# Patient Record
Sex: FEMALE | Race: WHITE | NOT HISPANIC OR LATINO | Employment: UNEMPLOYED | URBAN - METROPOLITAN AREA
[De-identification: names, ages, dates, MRNs, and addresses within clinical notes are randomized per-mention and may not be internally consistent; named-entity substitution may affect disease eponyms.]

---

## 2017-02-07 DIAGNOSIS — K22.2 SCHATZKI'S RING: Primary | ICD-10-CM

## 2017-02-07 NOTE — TELEPHONE ENCOUNTER
Due for check up in March- so if dosing every other day, thirty pills should be fine  Would like to discuss her symptoms and alternative treatment at that time

## 2017-02-07 NOTE — TELEPHONE ENCOUNTER
Pending Prescriptions:                       Disp   Refills    omeprazole (PRILOSEC) 20 MG CR capsule [P*30 cap*0            Sig: TAKE ONE CAPSULE BY MOUTH EVERY DAY    BJS please review:    Last refill was 11-5-2015  Pt had px on 3-.  Do you want to refill for 30? Or longer?  Please change qty, fax, deny or send to   Dawna  [phone] (home) [phone] (work)

## 2017-04-05 ENCOUNTER — TRANSFERRED RECORDS (OUTPATIENT)
Dept: FAMILY MEDICINE | Facility: CLINIC | Age: 57
End: 2017-04-05

## 2017-04-06 ENCOUNTER — TRANSFERRED RECORDS (OUTPATIENT)
Dept: FAMILY MEDICINE | Facility: CLINIC | Age: 57
End: 2017-04-06

## 2017-04-10 DIAGNOSIS — J31.0 CHRONIC RHINITIS: ICD-10-CM

## 2017-04-10 RX ORDER — FLUTICASONE PROPIONATE 50 MCG
SPRAY, SUSPENSION (ML) NASAL
Qty: 3 BOTTLE | Refills: 3 | Status: SHIPPED | OUTPATIENT
Start: 2017-04-10 | End: 2019-05-17

## 2017-04-10 NOTE — TELEPHONE ENCOUNTER
Ana Luisa Rao is requesting a refill of:    Pending Prescriptions:                       Disp   Refills    fluticasone (FLONASE) 50 MCG/ACT spray [P*16 g   0            Sig: USE 1-2 SPRAYS INTO BOTH NOSTRILS ONCE DAILY    Pt last seen 3/21/16. Please advise

## 2017-05-15 ENCOUNTER — OFFICE VISIT (OUTPATIENT)
Dept: FAMILY MEDICINE | Facility: CLINIC | Age: 57
End: 2017-05-15

## 2017-05-15 VITALS
HEIGHT: 65 IN | HEART RATE: 72 BPM | DIASTOLIC BLOOD PRESSURE: 76 MMHG | SYSTOLIC BLOOD PRESSURE: 128 MMHG | TEMPERATURE: 98.2 F | WEIGHT: 227.2 LBS | BODY MASS INDEX: 37.85 KG/M2

## 2017-05-15 DIAGNOSIS — M79.2 NEUROPATHIC PAIN SYNDROME (NON-HERPETIC): Primary | ICD-10-CM

## 2017-05-15 DIAGNOSIS — Z87.39 HISTORY OF OSTEOPENIA: ICD-10-CM

## 2017-05-15 DIAGNOSIS — G47.00 INSOMNIA, UNSPECIFIED: ICD-10-CM

## 2017-05-15 PROCEDURE — 99213 OFFICE O/P EST LOW 20 MIN: CPT | Performed by: PHYSICIAN ASSISTANT

## 2017-05-15 RX ORDER — GABAPENTIN 300 MG/1
CAPSULE ORAL
Qty: 270 CAPSULE | Refills: 3 | Status: SHIPPED | OUTPATIENT
Start: 2017-05-15 | End: 2021-06-18

## 2017-05-15 RX ORDER — MIRTAZAPINE 15 MG/1
15 TABLET, FILM COATED ORAL AT BEDTIME
Qty: 1 TABLET | Refills: 0 | Status: SHIPPED | OUTPATIENT
Start: 2017-05-15 | End: 2017-08-07 | Stop reason: SINTOL

## 2017-05-15 RX ORDER — ZOLPIDEM TARTRATE 10 MG/1
5 TABLET ORAL
Qty: 30 TABLET | Refills: 0 | Status: SHIPPED | OUTPATIENT
Start: 2017-05-15 | End: 2017-08-07

## 2017-05-15 NOTE — PROGRESS NOTES
"CC: Medication check    History:  Ana Luisa is here for multiple medication refills.     For her nerve pain she takes Gabapentin 300 mg AM, 600 mg PM. She is tolerating this well, but does admit she still struggles with the pain of her fibromyalgia.     Prilosec 20 mg daily. Does not think she needs refill of this today, but she has been taking this for several years to help with her reflux. She has not had a DEXA scan since 2005, which showed osteopenia.     She also takes Fluticasone 2 sprays once daily.     Ana Luisa takes Ambien as needed for sleep at night. She uses this sparingly. She tolerates it well. When she doesn't want to use the Ambien she takes Benadryl almost every night, but this makes her groggy when she wakes up.     PMH, MEDICATIONS, ALLERGIES, SOCIAL AND FAMILY HISTORY in [facility] and reviewed by me personally.      ROS negative other than the symptoms noted above in the HPI.        Examination   /76 (BP Location: Left arm, Patient Position: Chair, Cuff Size: Adult Large)  Pulse 72  Temp 98.2  F (36.8  C) (Oral)  Ht 1.651 m (5' 5\")  Wt 103.1 kg (227 lb 3.2 oz)  LMP 07/29/2013  BMI 37.81 kg/m2       Constitutional: Sitting comfortably, in no acute distress. Vital signs noted  Eyes: pupils equal round reactive to light and accomodation, extra ocular movements intact  Cardiovascular:  regular rate and rhythm, no murmurs, clicks, or gallops  Respiratory:  normal respiratory rate and rhythm, lungs clear to auscultation  Abdomen: Abdomen soft, non-tender. BS normal. No masses, organomegaly  Psychiatric: mentation appears normal and affect normal/bright        A/P    ICD-10-CM    1. History of osteopenia Z87.39 Dexa hip/pelvis/spine*   2. Neuropathic pain syndrome (non-herpetic) M79.2 gabapentin (NEURONTIN) 300 MG capsule   3. Insomnia, unspecified G47.00 zolpidem (AMBIEN) 10 MG tablet     mirtazapine (REMERON) 15 MG tablet       DISCUSSION: Spent time discussing that we should check her bone scan again " given age and long term use of Prilosec.     Gave refill of gabapentin for nerve pain.    For insomnia, explained that both Benadryl and Ambien are causing some evidence-based concern for dementia. Recommended she do trial of Remeron as needed. This would be safe to take daily at bedtime, but warned of potential side effects. Should not take with alcohol, or with grapefruit products. She will contact me if she would like further refills of this.     follow up visit: As needed    [facility]

## 2017-05-15 NOTE — NURSING NOTE
Ana Luisa Rao is here for a medication check and also a refill.  Questioned patient about current smoking habits.  Pt. quit smoking some time ago.  Body mass index is 25.89 kg/(m^2).  PULSE regular  My Chart: active  CLASSIFICATION OF OVERWEIGHT AND OBESITY BY BMI                        Obesity Class           BMI(kg/m2)  Underweight                                    < 18.5  Normal                                         18.5-24.9  Overweight                                     25.0-29.9  OBESITY                     I                  30.0-34.9                             II                 35.0-39.9  EXTREME OBESITY             III                >40                            Patient's  BMI Body mass index is 37.81 kg/(m^2).  http://hin.nhlbi.nih.gov/menuplanner/menu.cgi    Pre-visit planning  Immunizations - up to date  Colonoscopy - is up to date  Mammogram - is up to date  Asthma -   PHQ9 -    KEYSHAWN-7 -

## 2017-05-15 NOTE — MR AVS SNAPSHOT
After Visit Summary   5/15/2017    Ana Luisa Rao    MRN: [id number]           Patient Information     Date Of Birth          1960        Visit Information        Provider Department      5/15/2017 3:30 PM Shantelle Brian PA-C [facility]        Today's Diagnoses     Neuropathic pain syndrome (non-herpetic)    -  1    Insomnia, unspecified        History of osteopenia           Follow-ups after your visit        Follow-up notes from your care team     Return in about 1 year (around 5/15/2018).      Future tests that were ordered for you today     Open Standing Orders        Priority Remaining Interval Expires Ordered    Dexa hip/pelvis/spine* Routine 1/1  5/15/2018 5/15/2017            Who to contact     If you have questions or need follow up information about today's clinic visit or your schedule please contact [facility] directly at [phone].  Normal or non-critical lab and imaging results will be communicated to you by [employer], letter or phone within 4 business days after the clinic has received the results. If you do not hear from us within 7 days, please contact the clinic through [employer] or phone. If you have a critical or abnormal lab result, we will notify you by phone as soon as possible.  Submit refill requests through [employer] or call your pharmacy and they will forward the refill request to us. Please allow 3 business days for your refill to be completed.          Additional Information About Your Visit        [employer] Information     HaveMy[employer] gives you secure access to your electronic health record. If you see a primary care provider, you can also send messages to your care team and make appointments. If you have questions, please call your primary care clinic.  If you do not have a primary care provider, please call [phone] and they will assist you.        Care EveryWhere ID     This is your Care EveryWhere ID. This could  "be used by other organizations to access your [facility] medical records  [id number]        Your Vitals Were     Pulse Temperature Height Last Period BMI (Body Mass Index)       72 98.2  F (36.8  C) (Oral) 1.651 m (5' 5\") 07/29/2013 37.81 kg/m2        Blood Pressure from Last 3 Encounters:   05/15/17 128/76   03/21/16 125/89   11/05/15 119/71    Weight from Last 3 Encounters:   05/15/17 103.1 kg (227 lb 3.2 oz)   03/21/16 102.3 kg (225 lb 9.6 oz)   11/05/15 98.3 kg (216 lb 12.8 oz)                 Today's Medication Changes          These changes are accurate as of: 5/15/17  6:08 PM.  If you have any questions, ask your nurse or doctor.               Start taking these medicines.        Dose/Directions    mirtazapine 15 MG tablet   Commonly known as:  REMERON   Used for:  Insomnia, unspecified   Started by:  Shantelle Brian PA-C        Dose:  15 mg   Take 1 tablet (15 mg) by mouth At Bedtime   Quantity:  1 tablet   Refills:  0            Where to get your medicines      These medications were sent to [facility] [place], [place] - [address]  [address] [facility] [address]     Phone:  [phone]     gabapentin 300 MG capsule    mirtazapine 15 MG tablet         Some of these will need a paper prescription and others can be bought over the counter.  Ask your nurse if you have questions.     Bring a paper prescription for each of these medications     zolpidem 10 MG tablet                Primary Care Provider Office Phone # Fax #    Nata Rodriguez [phone]       [facility] [address]  [facility] [address]        Thank you!     Thank you for choosing [facility]  for your care. Our goal is always to provide you with excellent care. Hearing back from our patients is one way we can continue to improve our services. Please take a few minutes to complete the written survey that you may receive in the " mail after your visit with us. Thank you!             Your Updated Medication List - Protect others around you: Learn how to safely use, store and throw away your medicines at www.disposemymeds.org.          This list is accurate as of: 5/15/17  6:08 PM.  Always use your most recent med list.                   Brand Name Dispense Instructions for use    BENADRYL DECONGESTANT 25-60 MG Tabs   Generic drug:  Diphenhydramine-Pseudoephed      1 TABLET EVERY 6 HOURS AS NEEDED       clobetasol 0.05 % cream    TEMOVATE    30 g    Apply sparingly to affected area twice daily as needed.  Do not apply to face.       fluticasone 50 MCG/ACT spray    FLONASE    3 Bottle    USE 1-2 SPRAYS INTO BOTH NOSTRILS ONCE DAILY       gabapentin 300 MG capsule    NEURONTIN    270 capsule    TAKE ONE CAPUSULE BY MOUTH EVERY MORNING AND TWO CAPSULES DAILY BEFORE BED.       ibuprofen 200 MG tablet    ADVIL/MOTRIN     1 TABLET EVERY 4 TO 6 HOURS AS NEEDED       mirtazapine 15 MG tablet    REMERON    1 tablet    Take 1 tablet (15 mg) by mouth At Bedtime       MULTIVITAMIN PO      None Entered       omeprazole 20 MG CR capsule    priLOSEC    30 capsule    TAKE ONE CAPSULE BY MOUTH EVERY DAY       TUMS 500 MG chewable tablet   Generic drug:  calcium carbonate      1 TABLET 3 TIMES DAILY       zolpidem 10 MG tablet    AMBIEN    30 tablet    Take 0.5 tablets (5 mg) by mouth nightly as needed for sleep

## 2017-07-10 ENCOUNTER — TRANSFERRED RECORDS (OUTPATIENT)
Dept: FAMILY MEDICINE | Facility: CLINIC | Age: 57
End: 2017-07-10

## 2017-07-17 ENCOUNTER — TRANSFERRED RECORDS (OUTPATIENT)
Dept: FAMILY MEDICINE | Facility: CLINIC | Age: 57
End: 2017-07-17

## 2017-07-27 ENCOUNTER — TRANSFERRED RECORDS (OUTPATIENT)
Dept: FAMILY MEDICINE | Facility: CLINIC | Age: 57
End: 2017-07-27

## 2017-07-31 DIAGNOSIS — G47.00 INSOMNIA, UNSPECIFIED: ICD-10-CM

## 2017-07-31 RX ORDER — ZOLPIDEM TARTRATE 10 MG/1
5 TABLET ORAL
Qty: 30 TABLET | Refills: 0 | OUTPATIENT
Start: 2017-07-31

## 2017-07-31 NOTE — TELEPHONE ENCOUNTER
Ambien 10mg tablets      Last Written Prescription Date:  05-15-17  Last Fill Quantity: 30,   # refills: 0  Last Office Visit with [facility] [place] prescribing provider: 05-15-17  Future Office visit:       Routing refill request to provider for review/approval because:  Drug not on the [facility] [place] refill protocol or controlled substance    Thank you,  Levi Mcleod, Suzanne Pharmacy Technician  [facility]

## 2017-08-03 ENCOUNTER — MYC MEDICAL ADVICE (OUTPATIENT)
Dept: FAMILY MEDICINE | Facility: CLINIC | Age: 57
End: 2017-08-03

## 2017-08-03 NOTE — TELEPHONE ENCOUNTER
From: Ana Luisa Rao  To: Shantelle Brian PA-C  Sent: 8/3/2017 1:33 PM CDT  Subject: Question about medications    You responded to an Ambien refill request by saying I should be taking mirtazapine. FYI - When you prescribed me the mirtazapine you only prescribed 1 SINGLE PILL. But that turned out just fine because I had bad side effects from it (mainly felt very doped up for 24 hrs) so I will not take that drug again in the future. I have some Ambien left but was just trying to get prescriptions on same pick-up schedule.

## 2017-08-07 DIAGNOSIS — G47.00 INSOMNIA, UNSPECIFIED: ICD-10-CM

## 2017-08-07 NOTE — TELEPHONE ENCOUNTER
Ana Luisa Rao is requesting a refill of:    Pending Prescriptions:                       Disp   Refills    zolpidem (AMBIEN) 10 MG tablet            30 tab*0            Sig: Take 0.5 tablets (5 mg) by mouth nightly as           needed for sleep    Last refill was no 5/16/17 for #30

## 2017-08-08 RX ORDER — ZOLPIDEM TARTRATE 10 MG/1
5 TABLET ORAL
Qty: 30 TABLET | Refills: 0 | Status: SHIPPED | OUTPATIENT
Start: 2017-08-08 | End: 2018-10-23

## 2017-09-18 ENCOUNTER — OFFICE VISIT (OUTPATIENT)
Dept: FAMILY MEDICINE | Facility: CLINIC | Age: 57
End: 2017-09-18

## 2017-09-18 VITALS
SYSTOLIC BLOOD PRESSURE: 120 MMHG | OXYGEN SATURATION: 99 % | TEMPERATURE: 97.8 F | WEIGHT: 227.8 LBS | HEART RATE: 70 BPM | BODY MASS INDEX: 37.91 KG/M2 | DIASTOLIC BLOOD PRESSURE: 80 MMHG

## 2017-09-18 DIAGNOSIS — M25.861 CYST OF RIGHT KNEE JOINT: Primary | ICD-10-CM

## 2017-09-18 DIAGNOSIS — Z23 NEED FOR VACCINATION: ICD-10-CM

## 2017-09-18 PROCEDURE — 90686 IIV4 VACC NO PRSV 0.5 ML IM: CPT | Performed by: PHYSICIAN ASSISTANT

## 2017-09-18 PROCEDURE — 99213 OFFICE O/P EST LOW 20 MIN: CPT | Mod: 25 | Performed by: PHYSICIAN ASSISTANT

## 2017-09-18 PROCEDURE — 90471 IMMUNIZATION ADMIN: CPT | Performed by: PHYSICIAN ASSISTANT

## 2017-09-18 NOTE — MR AVS SNAPSHOT
After Visit Summary   9/18/2017    Ana Luisa Rao    MRN: [id number]           Patient Information     Date Of Birth          1960        Visit Information        Provider Department      9/18/2017 3:30 PM Shantelle Brian PA-C [facility]        Today's Diagnoses     Cyst of right knee joint    -  1    Need for vaccination           Follow-ups after your visit        Follow-up notes from your care team     Return if symptoms worsen or fail to improve.      Who to contact     If you have questions or need follow up information about today's clinic visit or your schedule please contact [facility] directly at [phone].  Normal or non-critical lab and imaging results will be communicated to you by Kitehart, letter or phone within 4 business days after the clinic has received the results. If you do not hear from us within 7 days, please contact the clinic through Kite[employer] or phone. If you have a critical or abnormal lab result, we will notify you by phone as soon as possible.  Submit refill requests through [employer] or call your pharmacy and they will forward the refill request to us. Please allow 3 business days for your refill to be completed.          Additional Information About Your Visit        MyChart Information     [employer] gives you secure access to your electronic health record. If you see a primary care provider, you can also send messages to your care team and make appointments. If you have questions, please call your primary care clinic.  If you do not have a primary care provider, please call [phone] and they will assist you.        Care EveryWhere ID     This is your Care EveryWhere ID. This could be used by other organizations to access your [facility] medical records  [id number]        Your Vitals Were     Pulse Temperature Last Period Pulse Oximetry Breastfeeding? BMI (Body Mass Index)    70 97.8  F (36.6  C) (Oral)  07/29/2013 99% No 37.91 kg/m2       Blood Pressure from Last 3 Encounters:   09/18/17 120/80   05/15/17 128/76   03/21/16 125/89    Weight from Last 3 Encounters:   09/18/17 103.3 kg (227 lb 12.8 oz)   05/15/17 103.1 kg (227 lb 3.2 oz)   03/21/16 102.3 kg (225 lb 9.6 oz)              We Performed the Following     HC FLU VAC PRESRV FREE QUAD SPLIT VIR 3+YRS IM     VACCINE ADMINISTRATION, INITIAL        Primary Care Provider Office Phone # Fax #    Nata Rodriguez [phone] [address] [address]        Equal Access to Services     HOSSEIN BARCENAS : Hadii aad ku hadasho Soomaali, waaxda luqadaha, qaybta kaalmada adeegyada, madhu stephen . So [facility] [phone].    ATENCIÓN: Si habla español, tiene a braxton disposición servicios gratuitos de asistencia lingüística. Llame al [phone].    We comply with applicable federal civil rights laws and [place] laws. We do not discriminate on the basis of race, color, national origin, age, disability sex, sexual orientation or gender identity.            Thank you!     Thank you for choosing [facility]  for your care. Our goal is always to provide you with excellent care. Hearing back from our patients is one way we can continue to improve our services. Please take a few minutes to complete the written survey that you may receive in the mail after your visit with us. Thank you!             Your Updated Medication List - Protect others around you: Learn how to safely use, store and throw away your medicines at www.disposemymeds.org.          This list is accurate as of: 9/18/17 11:59 PM.  Always use your most recent med list.                   Brand Name Dispense Instructions for use Diagnosis    BENADRYL DECONGESTANT 25-60 MG Tabs   Generic drug:  Diphenhydramine-Pseudoephed      1 TABLET EVERY 6 HOURS AS NEEDED        clobetasol 0.05 % cream    TEMOVATE    30 g    Apply  sparingly to affected area twice daily as needed.  Do not apply to face.    Pruritus of genital organs       fluticasone 50 MCG/ACT spray    FLONASE    3 Bottle    USE 1-2 SPRAYS INTO BOTH NOSTRILS ONCE DAILY    Chronic rhinitis       gabapentin 300 MG capsule    NEURONTIN    270 capsule    TAKE ONE CAPUSULE BY MOUTH EVERY MORNING AND TWO CAPSULES DAILY BEFORE BED.    Neuropathic pain syndrome (non-herpetic)       ibuprofen 200 MG tablet    ADVIL/MOTRIN     1 TABLET EVERY 4 TO 6 HOURS AS NEEDED        MULTIVITAMIN PO      None Entered        omeprazole 20 MG CR capsule    priLOSEC    30 capsule    TAKE ONE CAPSULE BY MOUTH EVERY DAY    Schatzki's ring       TUMS 500 MG chewable tablet   Generic drug:  calcium carbonate      1 TABLET 3 TIMES DAILY        zolpidem 10 MG tablet    AMBIEN    30 tablet    Take 0.5 tablets (5 mg) by mouth nightly as needed for sleep    Insomnia, unspecified

## 2017-09-18 NOTE — NURSING NOTE
Ana Luisa is here for knee problem-  Pt states that she has a bump on the side RT side of her Right knee.  Pt states that it does hurt.  5 days    Pre-Visit Screening :  Immunizations : up to date    Colonoscopy : is up to date  Mammogram : is up to date  Asthma Action Test/Plan : NA  PHQ9/GAD7 :  NA    Pulse - regular  My Chart - accepts    CLASSIFICATION OF OVERWEIGHT AND OBESITY BY BMI                         Obesity Class           BMI(kg/m2)  Underweight                                    < 18.5  Normal                                         18.5-24.9  Overweight                                     25.0-29.9  OBESITY                     I                  30.0-34.9                              II                 35.0-39.9  EXTREME OBESITY             III                >40                             Patient's  BMI Body mass index is 37.91 kg/(m^2).  http://hin.nhlbi.nih.gov/menuplanner/menu.cgi  Questioned patient about current smoking habits.  Pt. quit smoking some time ago.    [place].MEI Marquez ([facility])

## 2017-09-18 NOTE — PROGRESS NOTES
CC: Right knee bump/pain    History:  5 days ago, Ana Luisa developed a bump on upper calf/lower knee on right knee, medial surface. This has been stable since that time when she first noticed it. At first this was not painful, but then it did get a little tender. Tried to go on a walk yesterday and felt pain that traveled up to posterior thigh, and down to mid calf. No numbness or tingling in feet. No fevers, sweats, chills, shortness of breath, chest pain. No long car trips >3 hours, no airplanes, no injuries.     She is not sure if this is related, but she she mentions 2-3 weeks ago, was up at cabin having excruciating headache with nausea. Went to bathroom and saw she had a conjunctival hemorrhage. The headache is no longer an issue, other than occasional mild headaches.    Took an LD ASA Thursday and Friday night. No heating, icing.     PMH, MEDICATIONS, ALLERGIES, SOCIAL AND FAMILY HISTORY in [facility] and reviewed by me personally.      ROS negative other than the symptoms noted above in the HPI.        Examination   /80 (BP Location: Left arm, Patient Position: Chair, Cuff Size: Adult Large)  Pulse 70  Temp 97.8  F (36.6  C) (Oral)  Wt 103.3 kg (227 lb 12.8 oz)  LMP 07/29/2013  SpO2 99%  Breastfeeding? No  BMI 37.91 kg/m2       Constitutional: Sitting comfortably, in no acute distress. Vital signs noted  Eyes: pupils equal round reactive to light and accomodation, extra ocular movements intact  Neck:  no adenopathy, trachea midline and normal to palpation  Cardiovascular:  regular rate and rhythm, no murmurs, clicks, or gallops  Respiratory:  normal respiratory rate and rhythm, lungs clear to auscultation  M/S: ROM of knee, ankle intact. No obvious edema compared side to side. Small nodule approximately 1 cm in diameter palpated at medial knee. Non-tender. No chord. No tenderness in posterior thigh/knee/leg.    NEURO:  muscle strength normal, reflexes normal and symmetric  SKIN: No jaundice/pallor/rash.    Psychiatric: mentation appears normal and affect normal/bright         A/P    ICD-10-CM    1. Cyst of right knee joint M25.861    2. Need for vaccination Z23 VACCINE ADMINISTRATION, INITIAL     HC FLU VAC PRESRV FREE QUAD SPLIT VIR 3+YRS IM       DISCUSSION: Dr. Cisse also examined this patient. We agreed that the lesion is more suspicious for a small cyst, and very unlikely to be a blood clot given it's location and nodular quality. Recommended icing, heating, stretching, hydrating consider ibuprofen (with food), and contact me in 3-4 days if not significantly better to consider imaging. She should contact me sooner or proceed to ER if she develops SOB, chest pain, worsening pain at site.    follow up visit: As needed    [facility]

## 2017-10-30 ENCOUNTER — TRANSFERRED RECORDS (OUTPATIENT)
Dept: FAMILY MEDICINE | Facility: CLINIC | Age: 57
End: 2017-10-30

## 2018-01-25 DIAGNOSIS — R20.0 NUMBNESS: ICD-10-CM

## 2018-01-25 DIAGNOSIS — M70.50 PES ANSERINE BURSITIS: ICD-10-CM

## 2018-01-25 DIAGNOSIS — G62.2 INFLAMMATORY AND TOXIC NEUROPATHY (H): ICD-10-CM

## 2018-01-25 DIAGNOSIS — M54.12 CERVICAL RADICULOPATHY: ICD-10-CM

## 2018-01-25 DIAGNOSIS — G56.20 ULNAR NEUROPATHY: ICD-10-CM

## 2018-01-25 DIAGNOSIS — G61.9 INFLAMMATORY AND TOXIC NEUROPATHY (H): ICD-10-CM

## 2018-01-25 DIAGNOSIS — G56.00 CARPAL TUNNEL SYNDROME: ICD-10-CM

## 2018-01-25 DIAGNOSIS — M54.2 CERVICALGIA: Primary | ICD-10-CM

## 2018-02-01 ENCOUNTER — HOSPITAL ENCOUNTER (OUTPATIENT)
Dept: MAMMOGRAPHY | Facility: CLINIC | Age: 58
Discharge: HOME | End: 2018-02-01
Attending: FAMILY MEDICINE | Admitting: FAMILY MEDICINE
Payer: COMMERCIAL

## 2018-02-01 DIAGNOSIS — R20.0 NUMBNESS: ICD-10-CM

## 2018-02-01 DIAGNOSIS — M54.2 CERVICALGIA: ICD-10-CM

## 2018-02-01 DIAGNOSIS — M54.12 CERVICAL RADICULOPATHY: ICD-10-CM

## 2018-02-01 DIAGNOSIS — G56.00 CARPAL TUNNEL SYNDROME: ICD-10-CM

## 2018-02-01 DIAGNOSIS — G61.9 INFLAMMATORY AND TOXIC NEUROPATHY (H): ICD-10-CM

## 2018-02-01 DIAGNOSIS — Z12.31 VISIT FOR SCREENING MAMMOGRAM: ICD-10-CM

## 2018-02-01 DIAGNOSIS — M70.50 PES ANSERINE BURSITIS: ICD-10-CM

## 2018-02-01 DIAGNOSIS — G56.20 ULNAR NEUROPATHY: ICD-10-CM

## 2018-02-01 DIAGNOSIS — G62.2 INFLAMMATORY AND TOXIC NEUROPATHY (H): ICD-10-CM

## 2018-02-01 LAB
FOLATE SERPL-MCNC: 19.1 NG/ML
VIT B12 SERPL-MCNC: 412 PG/ML (ref 193–986)

## 2018-02-01 PROCEDURE — 77063 BREAST TOMOSYNTHESIS BI: CPT

## 2018-02-01 PROCEDURE — 82607 VITAMIN B-12: CPT | Performed by: PSYCHIATRY & NEUROLOGY

## 2018-02-01 PROCEDURE — 99000 SPECIMEN HANDLING OFFICE-LAB: CPT | Performed by: PSYCHIATRY & NEUROLOGY

## 2018-02-01 PROCEDURE — 83090 ASSAY OF HOMOCYSTEINE: CPT | Performed by: PSYCHIATRY & NEUROLOGY

## 2018-02-01 PROCEDURE — 82746 ASSAY OF FOLIC ACID SERUM: CPT | Performed by: PSYCHIATRY & NEUROLOGY

## 2018-02-01 PROCEDURE — 83921 ORGANIC ACID SINGLE QUANT: CPT | Mod: 90 | Performed by: PSYCHIATRY & NEUROLOGY

## 2018-02-01 PROCEDURE — 36415 COLL VENOUS BLD VENIPUNCTURE: CPT | Performed by: PSYCHIATRY & NEUROLOGY

## 2018-02-04 LAB — METHYLMALONATE SERPL-SCNC: 0.1 UMOL/L (ref 0–0.4)

## 2018-02-07 LAB — HCYS SERPL-SCNC: 6.6 UMOL/L (ref 4–12)

## 2018-02-14 ENCOUNTER — TRANSFERRED RECORDS (OUTPATIENT)
Dept: FAMILY MEDICINE | Facility: CLINIC | Age: 58
End: 2018-02-14

## 2018-06-20 ENCOUNTER — OFFICE VISIT (OUTPATIENT)
Dept: PODIATRY | Facility: CLINIC | Age: 58
End: 2018-06-20
Payer: COMMERCIAL

## 2018-06-20 ENCOUNTER — RADIANT APPOINTMENT (OUTPATIENT)
Dept: GENERAL RADIOLOGY | Facility: CLINIC | Age: 58
End: 2018-06-20
Attending: PODIATRIST
Payer: COMMERCIAL

## 2018-06-20 VITALS
BODY MASS INDEX: 37.54 KG/M2 | HEIGHT: 65 IN | DIASTOLIC BLOOD PRESSURE: 70 MMHG | WEIGHT: 225.3 LBS | SYSTOLIC BLOOD PRESSURE: 118 MMHG

## 2018-06-20 DIAGNOSIS — M76.71 TENDINITIS OF RIGHT PERONEUS BREVIS TENDON: ICD-10-CM

## 2018-06-20 DIAGNOSIS — M79.671 FOOT PAIN, RIGHT: ICD-10-CM

## 2018-06-20 DIAGNOSIS — M79.671 FOOT PAIN, RIGHT: Primary | ICD-10-CM

## 2018-06-20 PROBLEM — E66.01 MORBID OBESITY (H): Status: ACTIVE | Noted: 2018-06-20

## 2018-06-20 PROCEDURE — 99203 OFFICE O/P NEW LOW 30 MIN: CPT | Performed by: PODIATRIST

## 2018-06-20 PROCEDURE — 73630 X-RAY EXAM OF FOOT: CPT | Mod: RT

## 2018-06-20 NOTE — LETTER
"    2018         RE: Ana Luisa Rao  [address] [facility] [address]        Dear Colleague,    Thank you for referring your patient, Ana Luisa Rao, to the [facility]. Please see a copy of my visit note below.      ASSESSMENT/PLAN:    Encounter Diagnoses   Name Primary?     Foot pain, right Yes     Tendinitis of right peroneus brevis tendon      We reviewed the XR images together. Relevant anatomy was discussed.      Recommendations:    Relative rest  Ice  NSAIDs  Trilok brace to offload lateral foot/ ankle  She also requested a CAM walker. This is reasonable, as she is limping.     Follow up in 1 month.  If pain persists, will encouraged physical therapy.     Body mass index is 37.49 kg/(m^2).    Weight management plan: Patient was referred to their PCP to discuss a diet and exercise plan.      Manish Ramíerz DPM, FACFAS, MS    [place] [facility] of Podiatry/Foot & Ankle Surgery      ____________________________________________________________________    HPI:         Chief Complaint: right foot pain  Onset of problem: 1 day; she was walking barefoot on carpet. She had a sharp pain. Thought she stepped on glass.  Pain/ discomfort is described as:  \"tearing\"  Ratin/10   Frequency:  constant    The pain is made worse with walking, standing  Previous treatment: rest, ice    *  Patient Active Problem List   Diagnosis     Neuropathic pain syndrome (non-herpetic)     Vitamin D deficiency     Allergic rhinitis     Esophageal Reflux/ Schatzke's ring/ PPI long term     Myalgia and myositis     Health Care Home     Posttraumatic stress disorder     Elevated blood pressure reading     Low back pain     Lumbar degenerative disc disease     ACP (advance care planning)   *  *  Past Surgical History:   Procedure Laterality Date     ENDOSCOPY  ,     dysphagia     UPPER GI ENDOSCOPY  2013    GERD/ Schatzke's ring     UPPER GI ENDOSCOPY  2017    Jason ring   *  *  Current " Outpatient Prescriptions   Medication Sig Dispense Refill     Topiramate (TOPAMAX PO)        BENADRYL DECONGESTANT 25-60 MG OR TABS 1 TABLET EVERY 6 HOURS AS NEEDED       clobetasol (TEMOVATE) 0.05 % cream Apply sparingly to affected area twice daily as needed.  Do not apply to face. 30 g 0     fluticasone (FLONASE) 50 MCG/ACT spray USE 1-2 SPRAYS INTO BOTH NOSTRILS ONCE DAILY 3 Bottle 3     gabapentin (NEURONTIN) 300 MG capsule TAKE ONE CAPUSULE BY MOUTH EVERY MORNING AND TWO CAPSULES DAILY BEFORE BED. 270 capsule 3     IBUPROFEN 200 MG OR TABS 1 TABLET EVERY 4 TO 6 HOURS AS NEEDED       MULTIVITAMIN OR None Entered       omeprazole (PRILOSEC) 20 MG CR capsule TAKE ONE CAPSULE BY MOUTH EVERY DAY 30 capsule 0     TUMS 500 MG OR CHEW 1 TABLET 3 TIMES DAILY       zolpidem (AMBIEN) 10 MG tablet Take 0.5 tablets (5 mg) by mouth nightly as needed for sleep 30 tablet 0       ROS:     A 10-point review of systems was performed and is positive for that noted in the HPI and as seen below.  All other areas are negative.     Numbness in feet?  yes   Calf pain with walking? no  Recent foot/ankle injury? no  Weight change over past 12 months? no  Self perception as overweight? yes  Recent flu-like symptoms? no  Joint pain other than feet ? yes    Social History: Employment:  no;  Exercise/Physical activity:  no;  Tobacco use:  no  Social History     Social History     Marital status:      Spouse name: N/A     Number of children: N/A     Years of education: N/A     Occupational History      [employer]     Social History Main Topics     Smoking status: Former Smoker     Years: 10.00     Quit date: 1/1/2006     Smokeless tobacco: Never Used      Comment: quit 2006     Alcohol use 0.5 oz/week     1 drink(s) per week      Comment: once a month      Drug use: No     Sexual activity: Yes     Partners: Male      Comment: vas     Other Topics Concern     Exercise No     intermittently     Seat Belt Yes     Social History  "Narrative       Family history:  Family History   Problem Relation Age of Onset     Connective Tissue Disorder Mother      \"vasculitis\"     Cancer - colorectal Maternal Uncle      73     Breast Cancer No family hx of      Diabetes No family hx of      Hypertension Father      Blood Disease Brother      hemocrhomatosis       Rheumatoid arthritis:  yes  Foot Problems: no  Diabetes: no      EXAM:    Vitals: /70  Ht 5' 5\" (1.651 m)  Wt 225 lb 4.8 oz (102.2 kg)  LMP 07/29/2013  BMI 37.49 kg/m2  BMI: Body mass index is 37.49 kg/(m^2).  Height: 5' 5\"    Constitutional/ general:  Pt is in no apparent distress, appears well-nourished.  Cooperative with history and physical exam.     Vascular:  Pedal pulses are palpable bilaterally for both the DP and PT arteries.  CFT < 3 sec.  No edema.  Pedal hair growth noted.     Neuro:  Alert and oriented x 3. Coordinated gait.  Light touch sensation is intact to the L4, L5, S1 distributions. No obvious deficits.  No evidence of neurological-based weakness, spasticity, or contracture in the lower extremities.     Derm: Normal texture and turgor.  No erythema, ecchymosis, or cyanosis.  No open lesions.     Musculoskeletal:    Lower extremity muscle strength is normal.  Patient is ambulatory without an assistive device or brace.  Decrease in medial longitudinal arch with weight bearing.  Pain on palpation: base of the right 5th metatarsal. Mild pain with plantar flexion of the foot against resistance.  No pain with eversion of the foot against resistance.       Radiographic Exam:  X-Ray Findings:  I personally reviewed the right foot films.  negative      Manish Ramírez DPM, FACFAS, MS    [place] Department of Podiatry/Foot & Ankle Surgery                Again, thank you for allowing me to participate in the care of your patient.        Sincerely,        Manish Ramírez DPM    "

## 2018-06-20 NOTE — PROGRESS NOTES
"  ASSESSMENT/PLAN:    Encounter Diagnoses   Name Primary?     Foot pain, right Yes     Tendinitis of right peroneus brevis tendon      We reviewed the XR images together. Relevant anatomy was discussed.      Recommendations:    Relative rest  Ice  NSAIDs  Trilok brace to offload lateral foot/ ankle  She also requested a CAM walker. This is reasonable, as she is limping.     Follow up in 1 month.  If pain persists, will encouraged physical therapy.     Body mass index is 37.49 kg/(m^2).    Weight management plan: Patient was referred to their PCP to discuss a diet and exercise plan.      Manish Ramírez DPM, FACFAS, MS    [place] Department of Podiatry/Foot & Ankle Surgery      ____________________________________________________________________    HPI:         Chief Complaint: right foot pain  Onset of problem: 1 day; she was walking barefoot on carpet. She had a sharp pain. Thought she stepped on glass.  Pain/ discomfort is described as:  \"tearing\"  Ratin/10   Frequency:  constant    The pain is made worse with walking, standing  Previous treatment: rest, ice    *  Patient Active Problem List   Diagnosis     Neuropathic pain syndrome (non-herpetic)     Vitamin D deficiency     Allergic rhinitis     Esophageal Reflux/ Schatzke's ring/ PPI long term     Myalgia and myositis     Health Care Home     Posttraumatic stress disorder     Elevated blood pressure reading     Low back pain     Lumbar degenerative disc disease     ACP (advance care planning)   *  *  Past Surgical History:   Procedure Laterality Date     ENDOSCOPY  ,     dysphagia     UPPER GI ENDOSCOPY  2013    GERD/ Schatzke's ring     UPPER GI ENDOSCOPY  2017    Schaforrestkies ring   *  *  Current Outpatient Prescriptions   Medication Sig Dispense Refill     Topiramate (TOPAMAX PO)        BENADRYL DECONGESTANT 25-60 MG OR TABS 1 TABLET EVERY 6 HOURS AS NEEDED       clobetasol (TEMOVATE) 0.05 % cream Apply sparingly to affected area twice " "daily as needed.  Do not apply to face. 30 g 0     fluticasone (FLONASE) 50 MCG/ACT spray USE 1-2 SPRAYS INTO BOTH NOSTRILS ONCE DAILY 3 Bottle 3     gabapentin (NEURONTIN) 300 MG capsule TAKE ONE CAPUSULE BY MOUTH EVERY MORNING AND TWO CAPSULES DAILY BEFORE BED. 270 capsule 3     IBUPROFEN 200 MG OR TABS 1 TABLET EVERY 4 TO 6 HOURS AS NEEDED       MULTIVITAMIN OR None Entered       omeprazole (PRILOSEC) 20 MG CR capsule TAKE ONE CAPSULE BY MOUTH EVERY DAY 30 capsule 0     TUMS 500 MG OR CHEW 1 TABLET 3 TIMES DAILY       zolpidem (AMBIEN) 10 MG tablet Take 0.5 tablets (5 mg) by mouth nightly as needed for sleep 30 tablet 0       ROS:     A 10-point review of systems was performed and is positive for that noted in the HPI and as seen below.  All other areas are negative.     Numbness in feet?  yes   Calf pain with walking? no  Recent foot/ankle injury? no  Weight change over past 12 months? no  Self perception as overweight? yes  Recent flu-like symptoms? no  Joint pain other than feet ? yes    Social History: Employment:  no;  Exercise/Physical activity:  no;  Tobacco use:  no  Social History     Social History     Marital status:      Spouse name: N/A     Number of children: N/A     Years of education: N/A     Occupational History     [employer]     Social History Main Topics     Smoking status: Former Smoker     Years: 10.00     Quit date: 1/1/2006     Smokeless tobacco: Never Used      Comment: quit 2006     Alcohol use 0.5 oz/week     1 drink(s) per week      Comment: once a month      Drug use: No     Sexual activity: Yes     Partners: Male      Comment: vas     Other Topics Concern     Exercise No     intermittently     Seat Belt Yes     Social History Narrative       Family history:  Family History   Problem Relation Age of Onset     Connective Tissue Disorder Mother      \"vasculitis\"     Cancer - colorectal Maternal Uncle      73     Breast Cancer No family hx of      Diabetes No family hx of  " "    Hypertension Father      Blood Disease Brother      hemocrhomatosis       Rheumatoid arthritis:  yes  Foot Problems: no  Diabetes: no      EXAM:    Vitals: /70  Ht 5' 5\" (1.651 m)  Wt 225 lb 4.8 oz (102.2 kg)  LMP 07/29/2013  BMI 37.49 kg/m2  BMI: Body mass index is 37.49 kg/(m^2).  Height: 5' 5\"    Constitutional/ general:  Pt is in no apparent distress, appears well-nourished.  Cooperative with history and physical exam.     Vascular:  Pedal pulses are palpable bilaterally for both the DP and PT arteries.  CFT < 3 sec.  No edema.  Pedal hair growth noted.     Neuro:  Alert and oriented x 3. Coordinated gait.  Light touch sensation is intact to the L4, L5, S1 distributions. No obvious deficits.  No evidence of neurological-based weakness, spasticity, or contracture in the lower extremities.     Derm: Normal texture and turgor.  No erythema, ecchymosis, or cyanosis.  No open lesions.     Musculoskeletal:    Lower extremity muscle strength is normal.  Patient is ambulatory without an assistive device or brace.  Decrease in medial longitudinal arch with weight bearing.  Pain on palpation: base of the right 5th metatarsal. Mild pain with plantar flexion of the foot against resistance.  No pain with eversion of the foot against resistance.       Radiographic Exam:  X-Ray Findings:  I personally reviewed the right foot films.  negative      Manish Ramírez DPM, JACQUELINE, MS [place] Department of Podiatry/Foot & Ankle Surgery              "

## 2018-06-20 NOTE — MR AVS SNAPSHOT
"              After Visit Summary   6/20/2018    Ana Luisa Rao    MRN: [id number]           Patient Information     Date Of Birth          1960        Visit Information        Provider Department      6/20/2018 9:15 AM Manish Ramírez DPM [facility]        Today's Diagnoses     Foot pain, right    -  1    Tendinitis of right peroneus brevis tendon           Follow-ups after your visit        Who to contact     If you have questions or need follow up information about today's clinic visit or your schedule please contact [facility] directly at [phone].  Normal or non-critical lab and imaging results will be communicated to you by [email or web address], letter or phone within 4 business days after the clinic has received the results. If you do not hear from us within 7 days, please contact the clinic through [employer] or phone. If you have a critical or abnormal lab result, we will notify you by phone as soon as possible.  Submit refill requests through [employer] or call your pharmacy and they will forward the refill request to us. Please allow 3 business days for your refill to be completed.          Additional Information About Your Visit        MyChart Information     [employer] gives you secure access to your electronic health record. If you see a primary care provider, you can also send messages to your care team and make appointments. If you have questions, please call your primary care clinic.  If you do not have a primary care provider, please call [phone] and they will assist you.        Care EveryWhere ID     This is your Care EveryWhere ID. This could be used by other organizations to access your [place] medical records  [id number]        Your Vitals Were     Height Last Period BMI (Body Mass Index)             5' 5\" (1.651 m) 07/29/2013 37.49 kg/m2          Blood Pressure from Last 3 Encounters:   06/20/18 118/70   09/18/17 120/80   05/15/17 128/76    Weight from Last 3 Encounters: "   06/20/18 225 lb 4.8 oz (102.2 kg)   09/18/17 227 lb 12.8 oz (103.3 kg)   05/15/17 227 lb 3.2 oz (103.1 kg)                 Today's Medication Changes          These changes are accurate as of 6/20/18  2:12 PM.  If you have any questions, ask your nurse or doctor.               Start taking these medicines.        Dose/Directions    order for DME   Used for:  Foot pain, right, Tendinitis of right peroneus brevis tendon   Started by:  Manish Ramírez DPM        Equipment being ordered: 1)  short CAM walker/ pneumatic  2)  trilok ankle brace   Quantity:  2 Device   Refills:  0            Where to get your medicines      Some of these will need a paper prescription and others can be bought over the counter.  Ask your nurse if you have questions.     Bring a paper prescription for each of these medications     order for DME                Primary Care Provider Office Phone # Fax #    Nata Rodriguez [phone] [address] [address]        Equal Access to Services     [facility]: Hadii itzel putnam hadasho Soomaali, waaxda luqadaha, qaybta kaalmada adeegyalaura, madhu stephen . So [facility] [phone].    ATENCIÓN: Si habla español, tiene a braxton disposición servicios gratuitos de asistencia lingüística. Llame al [phone].    We comply with applicable federal civil rights laws and [place] laws. We do not discriminate on the basis of race, color, national origin, age, disability, sex, sexual orientation, or gender identity.            Thank you!     Thank you for choosing [facility]  for your care. Our goal is always to provide you with excellent care. Hearing back from our patients is one way we can continue to improve our services. Please take a few minutes to complete the written survey that you may receive in the mail after your visit with us. Thank you!             Your Updated Medication List - Protect others around you:  Learn how to safely use, store and throw away your medicines at www.disposemymeds.org.          This list is accurate as of 6/20/18  2:12 PM.  Always use your most recent med list.                   Brand Name Dispense Instructions for use Diagnosis    BENADRYL DECONGESTANT 25-60 MG Tabs   Generic drug:  Diphenhydramine-Pseudoephed      1 TABLET EVERY 6 HOURS AS NEEDED        clobetasol 0.05 % cream    TEMOVATE    30 g    Apply sparingly to affected area twice daily as needed.  Do not apply to face.    Pruritus of genital organs       fluticasone 50 MCG/ACT spray    FLONASE    3 Bottle    USE 1-2 SPRAYS INTO BOTH NOSTRILS ONCE DAILY    Chronic rhinitis       gabapentin 300 MG capsule    NEURONTIN    270 capsule    TAKE ONE CAPUSULE BY MOUTH EVERY MORNING AND TWO CAPSULES DAILY BEFORE BED.    Neuropathic pain syndrome (non-herpetic)       ibuprofen 200 MG tablet    ADVIL/MOTRIN     1 TABLET EVERY 4 TO 6 HOURS AS NEEDED        MULTIVITAMIN PO      None Entered        omeprazole 20 MG CR capsule    priLOSEC    30 capsule    TAKE ONE CAPSULE BY MOUTH EVERY DAY    Marcel's ring       order for DME     2 Device    Equipment being ordered: 1)  short CAM walker/ pneumatic  2)  trilok ankle brace    Foot pain, right, Tendinitis of right peroneus brevis tendon       TOPAMAX PO           TUMS 500 MG chewable tablet   Generic drug:  calcium carbonate      1 TABLET 3 TIMES DAILY        zolpidem 10 MG tablet    AMBIEN    30 tablet    Take 0.5 tablets (5 mg) by mouth nightly as needed for sleep    Insomnia, unspecified

## 2018-06-28 ENCOUNTER — TRANSFERRED RECORDS (OUTPATIENT)
Dept: FAMILY MEDICINE | Facility: CLINIC | Age: 58
End: 2018-06-28

## 2018-06-28 ENCOUNTER — MYC MEDICAL ADVICE (OUTPATIENT)
Dept: PODIATRY | Facility: CLINIC | Age: 58
End: 2018-06-28

## 2018-06-28 DIAGNOSIS — M79.671 RIGHT FOOT PAIN: Primary | ICD-10-CM

## 2018-06-28 DIAGNOSIS — M76.71 PERONEUS BREVIS TENDINITIS, RIGHT: ICD-10-CM

## 2018-06-28 NOTE — TELEPHONE ENCOUNTER
Please see [facility] message regarding request for MRI.     Routing to Dr. Ramírez.     JONATHAN Mondragon RN

## 2018-06-29 ENCOUNTER — HOSPITAL ENCOUNTER (OUTPATIENT)
Dept: LAB | Facility: CLINIC | Age: 58
Discharge: HOME | End: 2018-06-29
Attending: INTERNAL MEDICINE | Admitting: INTERNAL MEDICINE
Payer: COMMERCIAL

## 2018-06-29 ENCOUNTER — OFFICE VISIT (OUTPATIENT)
Dept: OTHER | Facility: CLINIC | Age: 58
End: 2018-06-29
Attending: INTERNAL MEDICINE
Payer: COMMERCIAL

## 2018-06-29 ENCOUNTER — TELEPHONE (OUTPATIENT)
Dept: PODIATRY | Facility: CLINIC | Age: 58
End: 2018-06-29

## 2018-06-29 VITALS
BODY MASS INDEX: 37.18 KG/M2 | OXYGEN SATURATION: 99 % | WEIGHT: 223.4 LBS | HEART RATE: 85 BPM | DIASTOLIC BLOOD PRESSURE: 86 MMHG | SYSTOLIC BLOOD PRESSURE: 130 MMHG

## 2018-06-29 DIAGNOSIS — M48.062 SPINAL STENOSIS OF LUMBAR REGION WITH NEUROGENIC CLAUDICATION: ICD-10-CM

## 2018-06-29 DIAGNOSIS — R68.2 MOUTH DRYNESS: ICD-10-CM

## 2018-06-29 DIAGNOSIS — M48.062 SPINAL STENOSIS OF LUMBAR REGION WITH NEUROGENIC CLAUDICATION: Primary | ICD-10-CM

## 2018-06-29 DIAGNOSIS — H04.123 DRY EYES: ICD-10-CM

## 2018-06-29 DIAGNOSIS — E55.9 VITAMIN D DEFICIENCY: ICD-10-CM

## 2018-06-29 DIAGNOSIS — K21.00 GASTROESOPHAGEAL REFLUX DISEASE WITH ESOPHAGITIS: ICD-10-CM

## 2018-06-29 DIAGNOSIS — M79.671 RIGHT FOOT PAIN: Primary | ICD-10-CM

## 2018-06-29 DIAGNOSIS — M76.71 PERONEUS BREVIS TENDINITIS, RIGHT: ICD-10-CM

## 2018-06-29 DIAGNOSIS — E78.49 OTHER HYPERLIPIDEMIA: ICD-10-CM

## 2018-06-29 LAB
ALBUMIN SERPL-MCNC: 3.8 G/DL (ref 3.4–5)
ALP SERPL-CCNC: 103 U/L (ref 40–150)
ALT SERPL W P-5'-P-CCNC: 32 U/L (ref 0–50)
ANION GAP SERPL CALCULATED.3IONS-SCNC: 6 MMOL/L (ref 3–14)
AST SERPL W P-5'-P-CCNC: 20 U/L (ref 0–45)
BASOPHILS # BLD AUTO: 0 10E9/L (ref 0–0.2)
BASOPHILS NFR BLD AUTO: 0.5 %
BILIRUB SERPL-MCNC: 0.3 MG/DL (ref 0.2–1.3)
BUN SERPL-MCNC: 11 MG/DL (ref 7–30)
CALCIUM SERPL-MCNC: 8.5 MG/DL (ref 8.5–10.1)
CHLORIDE SERPL-SCNC: 110 MMOL/L (ref 94–109)
CHOLEST SERPL-MCNC: 171 MG/DL
CO2 SERPL-SCNC: 25 MMOL/L (ref 20–32)
CREAT SERPL-MCNC: 0.67 MG/DL (ref 0.52–1.04)
CRP SERPL-MCNC: <2.9 MG/L (ref 0–8)
DIFFERENTIAL METHOD BLD: NORMAL
EOSINOPHIL # BLD AUTO: 0.1 10E9/L (ref 0–0.7)
EOSINOPHIL NFR BLD AUTO: 1.8 %
ERYTHROCYTE [DISTWIDTH] IN BLOOD BY AUTOMATED COUNT: 12.4 % (ref 10–15)
ERYTHROCYTE [SEDIMENTATION RATE] IN BLOOD BY WESTERGREN METHOD: 9 MM/H (ref 0–30)
GFR SERPL CREATININE-BSD FRML MDRD: >90 ML/MIN/1.7M2
GLUCOSE SERPL-MCNC: 97 MG/DL (ref 70–99)
HCT VFR BLD AUTO: 40.1 % (ref 35–47)
HDLC SERPL-MCNC: 39 MG/DL
HGB BLD-MCNC: 13.2 G/DL (ref 11.7–15.7)
IMM GRANULOCYTES # BLD: 0 10E9/L (ref 0–0.4)
IMM GRANULOCYTES NFR BLD: 0.4 %
LDLC SERPL CALC-MCNC: 102 MG/DL
LYMPHOCYTES # BLD AUTO: 2.2 10E9/L (ref 0.8–5.3)
LYMPHOCYTES NFR BLD AUTO: 38.8 %
MCH RBC QN AUTO: 29.9 PG (ref 26.5–33)
MCHC RBC AUTO-ENTMCNC: 32.9 G/DL (ref 31.5–36.5)
MCV RBC AUTO: 91 FL (ref 78–100)
MONOCYTES # BLD AUTO: 0.2 10E9/L (ref 0–1.3)
MONOCYTES NFR BLD AUTO: 4.1 %
NEUTROPHILS # BLD AUTO: 3.1 10E9/L (ref 1.6–8.3)
NEUTROPHILS NFR BLD AUTO: 54.4 %
NONHDLC SERPL-MCNC: 132 MG/DL
NRBC # BLD AUTO: 0 10*3/UL
NRBC BLD AUTO-RTO: 0 /100
PLATELET # BLD AUTO: 233 10E9/L (ref 150–450)
POTASSIUM SERPL-SCNC: 3.9 MMOL/L (ref 3.4–5.3)
PROT SERPL-MCNC: 7.5 G/DL (ref 6.8–8.8)
RBC # BLD AUTO: 4.41 10E12/L (ref 3.8–5.2)
SODIUM SERPL-SCNC: 141 MMOL/L (ref 133–144)
TRIGL SERPL-MCNC: 149 MG/DL
TSH SERPL DL<=0.005 MIU/L-ACNC: 1.92 MU/L (ref 0.4–4)
WBC # BLD AUTO: 5.6 10E9/L (ref 4–11)

## 2018-06-29 PROCEDURE — 82306 VITAMIN D 25 HYDROXY: CPT | Performed by: INTERNAL MEDICINE

## 2018-06-29 PROCEDURE — G0463 HOSPITAL OUTPT CLINIC VISIT: HCPCS

## 2018-06-29 PROCEDURE — 83516 IMMUNOASSAY NONANTIBODY: CPT | Performed by: INTERNAL MEDICINE

## 2018-06-29 PROCEDURE — 83876 ASSAY MYELOPEROXIDASE: CPT | Performed by: INTERNAL MEDICINE

## 2018-06-29 PROCEDURE — 86617 LYME DISEASE ANTIBODY: CPT | Performed by: INTERNAL MEDICINE

## 2018-06-29 PROCEDURE — 80053 COMPREHEN METABOLIC PANEL: CPT | Performed by: INTERNAL MEDICINE

## 2018-06-29 PROCEDURE — 85652 RBC SED RATE AUTOMATED: CPT | Performed by: INTERNAL MEDICINE

## 2018-06-29 PROCEDURE — 80061 LIPID PANEL: CPT | Performed by: INTERNAL MEDICINE

## 2018-06-29 PROCEDURE — 86235 NUCLEAR ANTIGEN ANTIBODY: CPT | Performed by: INTERNAL MEDICINE

## 2018-06-29 PROCEDURE — 86140 C-REACTIVE PROTEIN: CPT | Performed by: INTERNAL MEDICINE

## 2018-06-29 PROCEDURE — 99204 OFFICE O/P NEW MOD 45 MIN: CPT | Mod: ZP | Performed by: INTERNAL MEDICINE

## 2018-06-29 PROCEDURE — 85025 COMPLETE CBC W/AUTO DIFF WBC: CPT | Performed by: INTERNAL MEDICINE

## 2018-06-29 PROCEDURE — 84443 ASSAY THYROID STIM HORMONE: CPT | Performed by: INTERNAL MEDICINE

## 2018-06-29 PROCEDURE — 36415 COLL VENOUS BLD VENIPUNCTURE: CPT | Performed by: INTERNAL MEDICINE

## 2018-06-29 NOTE — NURSING NOTE
"Ana Luisa Rao is a 58 year old female who presents for:  Chief Complaint   Patient presents with     Consult     New consult for bilateral leg pain and pain with walking, cold calves and feet. Patient is a self referral         Vitals:    Vitals:    06/29/18 1114 06/29/18 1115   BP: 114/81 130/86   BP Location: Right arm Left arm   Patient Position: Chair Chair   Cuff Size: Adult Large Adult Large   Pulse: 71 85   SpO2: 99%    Weight: 223 lb 6.4 oz (101.3 kg)        BMI:  Estimated body mass index is 37.18 kg/(m^2) as calculated from the following:    Height as of 6/20/18: 5' 5\" (1.651 m).    Weight as of this encounter: 223 lb 6.4 oz (101.3 kg).    Pain Score:  Data Unavailable        Pippa Mercedes MA      "

## 2018-06-29 NOTE — PROGRESS NOTES
Vascular Medicine Consultation     Chief Complaint   Leg pain and swelling    Date of Admission:  (Not on file)    Ana Luisa Rao is a 58 year old female who was admitted on (Not on file). I was asked to see the patient for leg pain and swelling.    Code Status    Full code    Reason for Consult   Reason for consult: I was asked by PCP to evaluate this patient for leg pain and swelling.    Primary Care Physician   Nata Rodriguez      History is obtained from the patient    History of Present Illness   Ana Luisa Rao is a 58 year old female who presents with leg pain and swelling, patient has been complaining of leg pain and swelling for the past few weeks, leg swelling is localized to both legs and its above the ankle all the way up to the knees accompanied by severe tenderness and bruising patient denies any history of fall or trauma  Leg pain is bilateral related to effort and walking, worse by standing, relieved by sitting and lying down especially on her side, patient denies any history of night pain or rest pain, patient denies any history of skin color changes, yet she does have skin temperature changes, patient denies any history of gangrene in the past no history of blood clotting of DVT ,no significant past family history or past medical history.  Patient complained of dry eyes that ended with multiple corneal abrasions, when asked about dry mouth she did mention that she does have very severe dry mouth also she added that she has itching of the skin specifically above the finger knuckles in addition she was diagnosed with reflux and abdominal pain  Recently patient had complained of ankle pain that was diagnosed as tendinitis by her podiatrist  Patient denies any history of constitutional symptoms, no rash, but she did mention that her mother had questionable vasculitis history    Past Medical History   I have reviewed this patient's medical history and updated it with pertinent information if  "needed.   Past Medical History:   Diagnosis Date     Fibromyalgia syndrome      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       Past Surgical History   I have reviewed this patient's surgical history and updated it with pertinent information if needed.  Past Surgical History:   Procedure Laterality Date     ENDOSCOPY  2006, 2008    dysphagia     UPPER GI ENDOSCOPY  2/2013    GERD/ Schatzke's ring     UPPER GI ENDOSCOPY  04/13/2017    Schatzkies ring       Prior to Admission Medications   Cannot display prior to admission medications because the patient has not been admitted in this contact.     Allergies   Allergies   Allergen Reactions     Penicillins        Social History   I have reviewed this patient's social history and updated it with pertinent information if needed. Ana Luisa Rao  reports that she quit smoking about 12 years ago. She quit after 10.00 years of use. She has never used smokeless tobacco. She reports that she drinks about 0.5 oz of alcohol per week  She reports that she does not use illicit drugs.    Family History   I have reviewed this patient's family history and updated it with pertinent information if needed.   Family History   Problem Relation Age of Onset     Connective Tissue Disorder Mother      \"vasculitis\"     Cancer - colorectal Maternal Uncle      73     Breast Cancer No family hx of      Diabetes No family hx of      Hypertension Father      Blood Disease Brother      hemocrhomatosis       Review of Systems   The 10 point Review of Systems is negative other than noted in the HPI or here.     Physical Exam       BP: 130/86 Pulse: 85     SpO2: 99 %      Vital Signs with Ranges  Pulse:  [71-85] 85  BP: (114-130)/(81-86) 130/86  SpO2:  [99 %] 99 %  223 lbs 6.4 oz    Constitutional: awake, alert, cooperative, no apparent distress, and appears stated age  Eyes: Lids and lashes normal, pupils equal, round and reactive to light, extra ocular muscles intact, sclera " clear, conjunctiva normal  ENT: normocepalic, without obvious abnormality, oropharynx pink and moist  Hematologic / Lymphatic: no lymphadenopathy  Respiratory: No increased work of breathing, good air exchange, clear to auscultation bilaterally, no crackles or wheezing  Cardiovascular: regular rate and rhythm, normal S1 and S2 and no murmur noted  GI: Normal bowel sounds, soft, non-distended, non-tender  Skin: no redness, warmth, or swelling, no rashes and no lesions  Musculoskeletal: There is no redness, warmth, or swelling of the joints.  Full range of motion noted.  Motor strength is 5 out of 5 all extremities bilaterally.  Tone is normal.  Neurologic: Awake, alert, oriented to name, place and time.  Cranial nerves II-XII are grossly intact.  Motor is 5 out of 5 bilaterally.    Neuropsychiatric:  Normal affect, memory, insight.  Pulses: Positive pulses DP pulses could not appreciate PT pulses bilateral and equal +1  . No carotid bruits appreciated.     Data   Most Recent 3 CBC's:  Recent Labs   Lab Test  03/21/16   0959   WBC  5.8   HGB  13.1   MCV  92.5   PLT  227     Most Recent 3 BMP's:  Recent Labs   Lab Test  03/21/16   1000  02/20/13   0923  09/30/11   0300   NA  142   --   137   POTASSIUM  3.6  3.8  3.9   CHLORIDE  105   --   102   CO2  25   --   23   BUN  17  NOT APPLICABLE   --   12  NOT APPLICABLE   CR  0.57   --   0.63   SUSSY  9.3   --   9.6   GLC  87   --   86     Most Recent 2 LFT's:  Recent Labs   Lab Test 10/03/11   AST  25   ALT  28   ALKPHOS  78   BILITOTAL  0.3     Most Recent 3 Troponin's:No lab results found.  Most Recent 3 BNP's:No lab results found.  Most Recent Cholesterol Panel:  Recent Labs   Lab Test  03/21/16   1000   CHOL  199   LDL  124   HDL  43*   TRIG  160*     Most Recent Hemoglobin A1c:  Recent Labs   Lab Test  09/29/11   1416   A1C  4.7     Most Recent Urinalysis:  Recent Labs   Lab Test  03/21/16   0948  09/18/14   1436   COLOR  Yellow  Yellow   APPEARANCE   --   Slightly  Cloudy   URINEGLC  Neg  Neg   URINEBILI  Neg  Neg   URINEKETONE  Neg  Neg   SG  1.015   --    UBLD  Neg  Trace*   URINEPH  6.5  5.5   UROBILINOGEN  0.2  0.2   NITRITE  Neg  Neg     Most Recent ABG:  Recent Labs   Lab Test  10/01/11   1018   PH  5.5         Assessment & Plan   (M48.062) Spinal stenosis of lumbar region with neurogenic claudication  (primary encounter diagnosis)  Comment: Will rule out PAD as a cause of claudication then patient might need MRI of the lumbosacral spine  Plan: Scleroderma Antibody Scl70 MARIELA IgG, Erythrocyte        sedimentation rate auto, CRP inflammation, CBC         with platelets differential, **Comprehensive         metabolic panel FUTURE anytime, **UA reflex to         Microscopic FUTURE 2mo, Vasculitis panel, TSH,         Lipid Profile, Vitamin D Deficiency, Lyme         Confirm IgG by Immunoblot, US ELVA Doppler with         Exercise Bilateral, US Venous Competency         Bilateral            (E55.9) Vitamin D deficiency  Comment: We will check vitamin D levels, leg tenderness can be part of lipedema  Plan: Scleroderma Antibody Scl70 MARIELA IgG, Erythrocyte        sedimentation rate auto, CRP inflammation, CBC         with platelets differential, **Comprehensive         metabolic panel FUTURE anytime, **UA reflex to         Microscopic FUTURE 2mo, Vasculitis panel, TSH,         Lipid Profile, Vitamin D Deficiency, Lyme         Confirm IgG by Immunoblot            (K21.0) Gastroesophageal reflux disease with esophagitis  Comment: Stable can be part of scleroderma/crest syndrome  Plan: Scleroderma Antibody Scl70 MARIELA IgG, Erythrocyte        sedimentation rate auto, CRP inflammation, CBC         with platelets differential, **Comprehensive         metabolic panel FUTURE anytime, **UA reflex to         Microscopic FUTURE 2mo, Vasculitis panel, TSH,         Lipid Profile, Vitamin D Deficiency, Lyme         Confirm IgG by Immunoblot            (H04.123) Dry eyes  Comment: Can be part of  scleroderma  Plan: Scleroderma Antibody Scl70 MARIELA IgG, Erythrocyte        sedimentation rate auto, CRP inflammation, CBC         with platelets differential, **Comprehensive         metabolic panel FUTURE anytime, **UA reflex to         Microscopic FUTURE 2mo, Vasculitis panel, TSH,         Lipid Profile, Vitamin D Deficiency, Lyme         Confirm IgG by Immunoblot            (R68.2) Mouth dryness  Comment: Can be part of scleroderma  Plan: Scleroderma Antibody Scl70 MARIELA IgG, Erythrocyte        sedimentation rate auto, CRP inflammation, CBC         with platelets differential, **Comprehensive         metabolic panel FUTURE anytime, **UA reflex to         Microscopic FUTURE 2mo, Vasculitis panel, TSH,         Lipid Profile, Vitamin D Deficiency, Lyme         Confirm IgG by Immunoblot            (E78.4) Other hyperlipidemia  Comment: Patient exam revealed the presence of bilateral leg lipdedema which is characterized by leg swelling not involving the ankles, easy bruising, and tenderness, leg elevation and compression stockings are very limited in treating lipedema  Plan: Scleroderma Antibody Scl70 MARIELA IgG, Erythrocyte        sedimentation rate auto, CRP inflammation, CBC         with platelets differential, **Comprehensive         metabolic panel FUTURE anytime, **UA reflex to         Microscopic FUTURE 2mo, Vasculitis panel, TSH,         Lipid Profile, Vitamin D Deficiency, Lyme         Confirm IgG by Immunoblot, US ELVA Doppler with         Exercise Bilateral, US Venous Competency         Bilateral              Summary: Bilateral leg swelling and pain, swelling in be caused by lipedema/venous insufficiency  Leg pain most probably is secondary to lumbo-sacral spinal stenosis with radicular pain but PAD is a consideration and should be ruled out by ELVA with exercise as a preliminary test  Patient's condition can be secondary to scleroderma either and it is localized/generalized form will be testing scleroderma  antibodies which can be positive in various forms of scleroderma with different percentages  Constellation of symptoms can also be secondary to late Lyme disease manifestation as the patient has a history of Lyme disease  We will await test results when they are available will discuss them with the patient    Braden Lezama MD

## 2018-06-29 NOTE — MR AVS SNAPSHOT
After Visit Summary   6/29/2018    Ana Luisa Rao    MRN: [id number]           Patient Information     Date Of Birth          1960        Visit Information        Provider Department      6/29/2018 11:00 AM Braden Lezama MD [facility]      Today's Diagnoses     Spinal stenosis of lumbar region with neurogenic claudication    -  1    Vitamin D deficiency        Gastroesophageal reflux disease with esophagitis        Dry eyes        Mouth dryness        Other hyperlipidemia           Follow-ups after your visit        Follow-up notes from your care team     Return in about 4 weeks (around 7/27/2018).      Your next 10 appointments already scheduled     Jun 30, 2018  9:30 AM CDT   [facility]   [facility] ([facility])    [address]  [place] [address] [phone]           Take your medicines as usual, unless your doctor tells you not to. Bring a list of your current medicines to your exam (including vitamins, minerals and over-the-counter drugs). Also bring the results of similar scans you may have had.  Please remove any body piercings and hair extensions before you arrive.  Follow your doctor s orders. If you do not, we may have to postpone your exam.  You may or may not receive IV contrast for this exam pending the discretion of the Radiologist.  You do not need to do anything special to prepare.  The MRI machine uses a strong magnet. Please wear clothes without metal (snaps, zippers). A sweatsuit works well, or we may give you a hospital gown.   **IMPORTANT** THE INSTRUCTIONS BELOW ARE ONLY FOR THOSE PATIENTS WHO HAVE BEEN PRESCRIBED SEDATION OR GENERAL ANESTHESIA DURING THEIR MRI PROCEDURE:  IF YOUR DOCTOR PRESCRIBED ORAL SEDATION (take medicine to help you relax during your exam):   You must get the medicine from your  doctor (oral medication) before you arrive. Bring the medicine to the exam. Do not take it at home. You ll be told when to take it upon arriving for your exam.   Arrive one hour early. Bring someone who can take you home after the test. Your medicine will make you sleepy. After the exam, you may not drive, take a bus or take a taxi by yourself.  IF YOUR DOCTOR PRESCRIBED IV SEDATION:   Arrive one hour early. Bring someone who can take you home after the test. Your medicine will make you sleepy. After the exam, you may not drive, take a bus or take a taxi by yourself.   No eating 6 hours before your exam. You may have clear liquids up until 4 hours before your exam. (Clear liquids include water, clear tea, black coffee and fruit juice without pulp.)  IF YOUR DOCTOR PRESCRIBED ANESTHESIA (be asleep for your exam):   Arrive 1 1/2 hours early. Bring someone who can take you home after the test. You may not drive, take a bus or take a taxi by yourself.   No eating 8 hours before your exam. You may have clear liquids up until 4 hours before your exam. (Clear liquids include water, clear tea, black coffee and fruit juice without pulp.)   You will spend four to five hours in the recovery room.  Please call the Imaging Department at your exam site with any questions.              Future tests that were ordered for you today     Open Future Orders        Priority Expected Expires Ordered    MR Ankle Right w/o Contrast Routine  6/29/2019 6/29/2018    US ELVA Doppler with Exercise Bilateral Routine 6/29/2018 6/29/2019 6/29/2018    US Venous Competency Bilateral Routine 6/29/2018 6/29/2019 6/29/2018    TSH Routine 6/29/2018 7/6/2018 6/29/2018    Lipid Profile Routine 6/29/2018 7/6/2018 6/29/2018    Vitamin D Deficiency Routine 6/29/2018 7/6/2018 6/29/2018    Lyme Confirm IgG by Immunoblot Routine 6/29/2018 7/6/2018 6/29/2018    Vasculitis panel Routine 6/29/2018 12/29/2018 6/29/2018    Scleroderma Antibody Scl70 MARIELA IgG Routine  6/29/2018 11/29/2018 6/29/2018    Erythrocyte sedimentation rate auto Routine 6/29/2018 11/29/2018 6/29/2018    CRP inflammation Routine 6/29/2018 11/29/2018 6/29/2018    CBC with platelets differential Routine 6/29/2018 11/29/2018 6/29/2018    **Comprehensive metabolic panel FUTURE anytime Routine 6/29/2018 11/29/2018 6/29/2018    **UA reflex to Microscopic FUTURE 2mo Routine 6/29/2018 11/29/2018 6/29/2018    MR Foot Right w/o Contrast Routine  6/29/2019 6/29/2018            Who to contact     If you have questions or need follow up information about today's clinic visit or your schedule please contact [facility] directly at [phone].  Normal or non-critical lab and imaging results will be communicated to you by MyChart, letter or phone within 4 business days after the clinic has received the results. If you do not hear from us within 7 days, please contact the clinic through SupportPayhart or phone. If you have a critical or abnormal lab result, we will notify you by phone as soon as possible.  Submit refill requests through [employer] or call your pharmacy and they will forward the refill request to us. Please allow 3 business days for your refill to be completed.          Additional Information About Your Visit        SupportPayharFabrus Information     [employer] gives you secure access to your electronic health record. If you see a primary care provider, you can also send messages to your care team and make appointments. If you have questions, please call your primary care clinic.  If you do not have a primary care provider, please call [phone] and they will assist you.        Care EveryWhere ID     This is your Care EveryWhere ID. This could be used by other organizations to access your [facility] medical records  [id number]        Your Vitals Were     Pulse Last Period Pulse Oximetry Breastfeeding? BMI (Body Mass Index)       85 07/29/2013 99% No 37.18 kg/m2        Blood Pressure from Last 3  Encounters:   06/29/18 130/86   06/20/18 118/70   09/18/17 120/80    Weight from Last 3 Encounters:   06/29/18 101.3 kg (223 lb 6.4 oz)   06/20/18 102.2 kg (225 lb 4.8 oz)   09/18/17 103.3 kg (227 lb 12.8 oz)               Primary Care Provider Office Phone # Fax #    Nata Rodriguez [phone] [address] [address]        Equal Access to Services     [facility]: Hadii aad ku hadasho Soomaali, waaxda luqadaha, qaybta kaalmada adeegyada, madhu stephen . So [facility] [phone].    ATENCIÓN: Si habla español, tiene a braxton disposición servicios gratuitos de asistencia lingüística. [facility] [phone].    We comply with applicable federal civil rights laws and [place] laws. We do not discriminate on the basis of race, color, national origin, age, disability, sex, sexual orientation, or gender identity.            Thank you!     Thank you for choosing [facility]  for your care. Our goal is always to provide you with excellent care. Hearing back from our patients is one way we can continue to improve our services. Please take a few minutes to complete the written survey that you may receive in the mail after your visit with us. Thank you!             Your Updated Medication List - Protect others around you: Learn how to safely use, store and throw away your medicines at www.disposemymeds.org.          This list is accurate as of 6/29/18 12:39 PM.  Always use your most recent med list.                   Brand Name Dispense Instructions for use Diagnosis    BENADRYL DECONGESTANT 25-60 MG Tabs   Generic drug:  Diphenhydramine-Pseudoephed      1 TABLET EVERY 6 HOURS AS NEEDED        clobetasol 0.05 % cream    TEMOVATE    30 g    Apply sparingly to affected area twice daily as needed.  Do not apply to face.    Pruritus of genital organs       fluticasone 50 MCG/ACT spray    FLONASE    3 Bottle    USE 1-2 SPRAYS INTO BOTH  NOSTRILS ONCE DAILY    Chronic rhinitis       gabapentin 300 MG capsule    NEURONTIN    270 capsule    TAKE ONE CAPUSULE BY MOUTH EVERY MORNING AND TWO CAPSULES DAILY BEFORE BED.    Neuropathic pain syndrome (non-herpetic)       ibuprofen 200 MG tablet    ADVIL/MOTRIN     1 TABLET EVERY 4 TO 6 HOURS AS NEEDED        MULTIVITAMIN PO      None Entered        omeprazole 20 MG CR capsule    priLOSEC    30 capsule    TAKE ONE CAPSULE BY MOUTH EVERY DAY    Marcel's ring       order for DME     2 Device    Equipment being ordered: 1)  short CAM walker/ pneumatic  2)  trilok ankle brace    Foot pain, right, Tendinitis of right peroneus brevis tendon       TOPAMAX PO           TUMS 500 MG chewable tablet   Generic drug:  calcium carbonate      1 TABLET 3 TIMES DAILY        zolpidem 10 MG tablet    AMBIEN    30 tablet    Take 0.5 tablets (5 mg) by mouth nightly as needed for sleep    Insomnia, unspecified

## 2018-06-29 NOTE — TELEPHONE ENCOUNTER
Received voicemail from [facility] stating patient is scheduled for R foot MRI tomorrow. There was a note from Dr. Ramírez on the order that if an ankle MRI was needed to let him know.   The tech is recommending a rigth ankle MRI, not a foot MRI. They asked that a new order be placed for appointment tomorrow.     They would like a return call: [phone].    Please see order pending and sign if appropriate.     Routing to Dr. Ramírez.     JONATHAN Mondragon RN

## 2018-06-29 NOTE — TELEPHONE ENCOUNTER
Please see new [facility] request for MRI.     Routing to Dr. Ramírez.    JONATHAN Mondragon RN

## 2018-06-30 ENCOUNTER — HOSPITAL ENCOUNTER (OUTPATIENT)
Dept: MRI IMAGING | Facility: CLINIC | Age: 58
Discharge: HOME | End: 2018-06-30
Attending: PODIATRIST | Admitting: PODIATRIST
Payer: COMMERCIAL

## 2018-06-30 DIAGNOSIS — M79.671 RIGHT FOOT PAIN: ICD-10-CM

## 2018-06-30 DIAGNOSIS — M76.71 PERONEUS BREVIS TENDINITIS, RIGHT: ICD-10-CM

## 2018-06-30 LAB — B BURGDOR IGG SER QL IB: POSITIVE

## 2018-06-30 PROCEDURE — 73721 MRI JNT OF LWR EXTRE W/O DYE: CPT | Mod: RT

## 2018-07-02 LAB
DEPRECATED CALCIDIOL+CALCIFEROL SERPL-MC: 25 UG/L (ref 20–75)
ENA SCL70 IGG SER IA-ACNC: <0.2 AI (ref 0–0.9)
MYELOPEROXIDASE AB SER-ACNC: <0.2 AI (ref 0–0.9)
PROTEINASE3 IGG SER-ACNC: <0.2 AI (ref 0–0.9)

## 2018-07-03 ENCOUNTER — OFFICE VISIT (OUTPATIENT)
Dept: VASCULAR SURGERY | Facility: CLINIC | Age: 58
End: 2018-07-03
Payer: COMMERCIAL

## 2018-07-03 ENCOUNTER — HOSPITAL ENCOUNTER (OUTPATIENT)
Dept: ULTRASOUND IMAGING | Facility: CLINIC | Age: 58
Discharge: HOME | End: 2018-07-03
Attending: INTERNAL MEDICINE | Admitting: INTERNAL MEDICINE
Payer: COMMERCIAL

## 2018-07-03 DIAGNOSIS — E78.49 OTHER HYPERLIPIDEMIA: ICD-10-CM

## 2018-07-03 DIAGNOSIS — M48.062 SPINAL STENOSIS OF LUMBAR REGION WITH NEUROGENIC CLAUDICATION: ICD-10-CM

## 2018-07-03 PROCEDURE — 93924 LWR XTR VASC STDY BILAT: CPT

## 2018-07-03 PROCEDURE — 93970 EXTREMITY STUDY: CPT | Performed by: SURGERY

## 2018-07-10 ENCOUNTER — OFFICE VISIT (OUTPATIENT)
Dept: SURGERY | Facility: CLINIC | Age: 58
End: 2018-07-10
Payer: COMMERCIAL

## 2018-07-10 VITALS
HEART RATE: 83 BPM | DIASTOLIC BLOOD PRESSURE: 78 MMHG | BODY MASS INDEX: 37.15 KG/M2 | OXYGEN SATURATION: 99 % | WEIGHT: 223 LBS | SYSTOLIC BLOOD PRESSURE: 122 MMHG | HEIGHT: 65 IN | RESPIRATION RATE: 16 BRPM

## 2018-07-10 DIAGNOSIS — R00.2 PALPITATIONS: ICD-10-CM

## 2018-07-10 DIAGNOSIS — M54.50 CHRONIC MIDLINE LOW BACK PAIN WITHOUT SCIATICA: ICD-10-CM

## 2018-07-10 DIAGNOSIS — G89.29 CHRONIC MIDLINE LOW BACK PAIN WITHOUT SCIATICA: ICD-10-CM

## 2018-07-10 DIAGNOSIS — L98.9 SKIN LESION: ICD-10-CM

## 2018-07-10 DIAGNOSIS — I87.2 VENOUS (PERIPHERAL) INSUFFICIENCY: Primary | ICD-10-CM

## 2018-07-10 PROCEDURE — 99214 OFFICE O/P EST MOD 30 MIN: CPT | Performed by: INTERNAL MEDICINE

## 2018-07-10 NOTE — PROGRESS NOTES
Vascular Medicine Progress Note     Ana Luisa Rao is a 58 year old female who was admitted on (Not on file).  Patient is here for follow-up    Interval History   Patient's ultrasound arterial and venous were reviewed, results were conveyed to the patient, her ELVA resting in and post exercise were completely normal, venous insufficiency study showed evidence of right GS V reflux  Patient also complained of pain at the tip of the right big toe when examined there was a discolored spot looked like a more when asked patient said that can be painful sometimes but it did not increase in size and there was no change in color and no spontaneous bleeding or itching  In addition patient complained of back pain and bilateral leg pain mostly on the right side the pain was dull aching constricting pain affecting L5 dermatomes bilaterally    Physical Exam       BP: 122/78 Pulse: 83   Resp: 16 SpO2: 99 %      Vitals:    07/10/18 0938   Weight: 223 lb (101.2 kg)     Vital Signs with Ranges  Pulse:  [83] 83  Resp:  [16] 16  BP: (122)/(78) 122/78  SpO2:  [99 %] 99 %  [unfilled]    Constitutional: awake, alert, cooperative, no apparent distress, and appears stated age  Eyes: Lids and lashes normal, pupils equal, round and reactive to light, extra ocular muscles intact, sclera clear, conjunctiva normal  ENT: normocepalic, without obvious abnormality, oropharynx pink and moist  Hematologic / Lymphatic: no lymphadenopathy  Respiratory: No increased work of breathing, good air exchange, clear to auscultation bilaterally, no crackles or wheezing  Cardiovascular: regular rate and rhythm, normal S1 and S2 and no murmur noted  GI: Normal bowel sounds, soft, non-distended, non-tender  Skin: no redness, warmth, or swelling, no rashes and no lesions  Musculoskeletal: There is no redness, warmth, or swelling of the joints.  Full range of motion noted.  Motor strength is 5 out of 5 all extremities bilaterally.  Tone is  normal.  Neurologic: Awake, alert, oriented to name, place and time.  Cranial nerves II-XII are grossly intact.  Motor is 5 out of 5 bilaterally.    Neuropsychiatric:  Normal affect, memory, insight.  Pulses: Same as previous exam  . No carotid bruits appreciated.     Medications         Data   No results found for this or any previous visit (from the past 24 hour(s)).    Assessment & Plan   (I87.2) Venous (peripheral) insufficiency  (primary encounter diagnosis)  Comment: Compression stockings, thigh-high, 20-30 mmHg bilateral  Plan: Care instructions were given to the patient and will follow up with the patient in 3 months from now    (R00.2) Palpitations  Comment: 2D echocardiogram  Plan: Echocardiogram Limited            (M54.5,  G89.29) Chronic midline low back pain without sciatica  Comment: Questionable spinal stenosis  Plan: MR Lumbar Spine w/o & w Contrast                Summary: Chronic venous insufficiency, no evidence of DVT, superficial system affected, compression treatment for 3 months and will follow up with the patient  Referral to dermatology for skin lesion at the tip of her right big toe    Braden Lezama MD

## 2018-07-10 NOTE — MR AVS SNAPSHOT
After Visit Summary   7/10/2018    Ana Luisa Rao    MRN: [id number]           Patient Information     Date Of Birth          1960        Visit Information        Provider Department      7/10/2018 9:30 AM Braden Lezama MD [facility]      Today's Diagnoses     Venous (peripheral) insufficiency    -  1    Palpitations        Chronic midline low back pain without sciatica           Follow-ups after your visit        Follow-up notes from your care team     Return in about 3 months (around 10/10/2018).      Your next 10 appointments already scheduled     Jul 11, 2018  3:00 PM CDT   Ech Limited with [id number]   [facility] CV Echocardiography (Cardiovascular Imaging at [facility])    [address] [place] [id number] [phone]           1.  Please bring or wear a comfortable two-piece outfit. 2.  You may eat, drink and take your normal medicines. 3.  For any questions that cannot be answered, please contact the ordering physician              Future tests that were ordered for you today     Open Future Orders        Priority Expected Expires Ordered    MR Lumbar Spine w/o & w Contrast Routine 7/10/2018 7/10/2019 7/10/2018    Echocardiogram Limited Routine 7/10/2018 11/10/2018 7/10/2018            Who to contact     If you have questions or need follow up information about today's clinic visit or your schedule please contact [facility] directly at [phone].  Normal or non-critical lab and imaging results will be communicated to you by MyChart, letter or phone within 4 business days after the clinic has received the results. If you do not hear from us within 7 days, please contact the clinic through MyChart or phone. If you have a critical or abnormal lab result, we will notify you by phone as soon as possible.  Submit refill requests through [employer] or call your  "pharmacy and they will forward the refill request to us. Please allow 3 business days for your refill to be completed.          Additional Information About Your Visit        MyChart Information     [employer] gives you secure access to your electronic health record. If you see a primary care provider, you can also send messages to your care team and make appointments. If you have questions, please call your primary care clinic.  If you do not have a primary care provider, please call [phone] and they will assist you.        Care EveryWhere ID     This is your Care EveryWhere ID. This could be used by other organizations to access your [facility] medical records  [id number]        Your Vitals Were     Pulse Respirations Height Last Period Pulse Oximetry Breastfeeding?    83 16 5' 5\" (1.651 m) 07/29/2013 99% No    BMI (Body Mass Index)                   37.11 kg/m2            Blood Pressure from Last 3 Encounters:   07/10/18 122/78   06/29/18 130/86   06/20/18 118/70    Weight from Last 3 Encounters:   07/10/18 223 lb (101.2 kg)   06/29/18 223 lb 6.4 oz (101.3 kg)   06/20/18 225 lb 4.8 oz (102.2 kg)               Primary Care Provider Office Phone # Fax #    Nata Rodriguez [phone]       [address] [address]        Equal Access to Services     MARIJA BARCENAS AH: Hadii itzel ku hadasho Soomaali, waaxda luqadaha, qaybta kaalmada adeegyada, madhu stephen . So [facility] [phone].    ATENCIÓN: Si habla español, tiene a braxton disposición servicios gratuitos de asistencia lingüística. Rosario al [phone].    We comply with applicable federal civil rights laws and [place] laws. We do not discriminate on the basis of race, color, national origin, age, disability, sex, sexual orientation, or gender identity.            Thank you!     Thank you for choosing [facility]  for your care. Our goal is always to provide you with excellent " care. Hearing back from our patients is one way we can continue to improve our services. Please take a few minutes to complete the written survey that you may receive in the mail after your visit with us. Thank you!             Your Updated Medication List - Protect others around you: Learn how to safely use, store and throw away your medicines at www.disposemymeds.org.          This list is accurate as of 7/10/18 11:47 AM.  Always use your most recent med list.                   Brand Name Dispense Instructions for use Diagnosis    BENADRYL DECONGESTANT 25-60 MG Tabs   Generic drug:  Diphenhydramine-Pseudoephed      1 TABLET EVERY 6 HOURS AS NEEDED        clobetasol 0.05 % cream    TEMOVATE    30 g    Apply sparingly to affected area twice daily as needed.  Do not apply to face.    Pruritus of genital organs       fluticasone 50 MCG/ACT spray    FLONASE    3 Bottle    USE 1-2 SPRAYS INTO BOTH NOSTRILS ONCE DAILY    Chronic rhinitis       gabapentin 300 MG capsule    NEURONTIN    270 capsule    TAKE ONE CAPUSULE BY MOUTH EVERY MORNING AND TWO CAPSULES DAILY BEFORE BED.    Neuropathic pain syndrome (non-herpetic)       ibuprofen 200 MG tablet    ADVIL/MOTRIN     1 TABLET EVERY 4 TO 6 HOURS AS NEEDED        MULTIVITAMIN PO      None Entered        omeprazole 20 MG CR capsule    priLOSEC    30 capsule    TAKE ONE CAPSULE BY MOUTH EVERY DAY    Schaforrestki's ring       order for DME     2 Device    Equipment being ordered: 1)  short CAM walker/ pneumatic  2)  trilok ankle brace    Foot pain, right, Tendinitis of right peroneus brevis tendon       TOPAMAX PO           TUMS 500 MG chewable tablet   Generic drug:  calcium carbonate      1 TABLET 3 TIMES DAILY        zolpidem 10 MG tablet    AMBIEN    30 tablet    Take 0.5 tablets (5 mg) by mouth nightly as needed for sleep    Insomnia, unspecified

## 2018-07-11 ENCOUNTER — HOSPITAL ENCOUNTER (OUTPATIENT)
Dept: CARDIOLOGY | Facility: CLINIC | Age: 58
Discharge: HOME | End: 2018-07-11
Attending: INTERNAL MEDICINE | Admitting: INTERNAL MEDICINE
Payer: COMMERCIAL

## 2018-07-11 DIAGNOSIS — T14.8XXA BRUISING: Primary | ICD-10-CM

## 2018-07-11 DIAGNOSIS — R00.2 PALPITATIONS: ICD-10-CM

## 2018-07-11 PROCEDURE — 93306 TTE W/DOPPLER COMPLETE: CPT

## 2018-07-11 PROCEDURE — 93306 TTE W/DOPPLER COMPLETE: CPT | Mod: 26 | Performed by: INTERNAL MEDICINE

## 2018-07-13 ENCOUNTER — HOSPITAL ENCOUNTER (OUTPATIENT)
Dept: MRI IMAGING | Facility: CLINIC | Age: 58
Discharge: HOME | End: 2018-07-13
Attending: INTERNAL MEDICINE | Admitting: INTERNAL MEDICINE
Payer: COMMERCIAL

## 2018-07-13 DIAGNOSIS — M54.50 CHRONIC MIDLINE LOW BACK PAIN WITHOUT SCIATICA: ICD-10-CM

## 2018-07-13 DIAGNOSIS — G89.29 CHRONIC MIDLINE LOW BACK PAIN WITHOUT SCIATICA: ICD-10-CM

## 2018-07-13 PROCEDURE — 72148 MRI LUMBAR SPINE W/O DYE: CPT

## 2018-07-16 ENCOUNTER — OFFICE VISIT (OUTPATIENT)
Dept: FAMILY MEDICINE | Facility: CLINIC | Age: 58
End: 2018-07-16

## 2018-07-16 VITALS
SYSTOLIC BLOOD PRESSURE: 112 MMHG | HEART RATE: 89 BPM | TEMPERATURE: 98.4 F | WEIGHT: 221 LBS | OXYGEN SATURATION: 99 % | DIASTOLIC BLOOD PRESSURE: 76 MMHG | BODY MASS INDEX: 36.78 KG/M2

## 2018-07-16 DIAGNOSIS — L98.9 BENIGN SKIN LESION: Primary | ICD-10-CM

## 2018-07-16 PROCEDURE — 88305 TISSUE EXAM BY PATHOLOGIST: CPT | Mod: 90 | Performed by: FAMILY MEDICINE

## 2018-07-16 PROCEDURE — 11100 HC BIOPSY SKIN/SUBQ/MUC MEM, SINGLE LESION: CPT | Performed by: FAMILY MEDICINE

## 2018-07-16 NOTE — MR AVS SNAPSHOT
After Visit Summary   7/16/2018    Ana Luisa Rao    MRN: [id number]           Patient Information     Date Of Birth          1960        Visit Information        Provider Department      7/16/2018 5:15 PM Nata Rodriguez MD [facility]        Today's Diagnoses     Benign skin lesion    -  1       Follow-ups after your visit        Who to contact     If you have questions or need follow up information about today's clinic visit or your schedule please contact [facility] directly at [phone].  Normal or non-critical lab and imaging results will be communicated to you by MOGLhart, letter or phone within 4 business days after the clinic has received the results. If you do not hear from us within 7 days, please contact the clinic through MOGLhart or phone. If you have a critical or abnormal lab result, we will notify you by phone as soon as possible.  Submit refill requests through [employer] or call your pharmacy and they will forward the refill request to us. Please allow 3 business days for your refill to be completed.          Additional Information About Your Visit        MyChart Information     [employer] gives you secure access to your electronic health record. If you see a primary care provider, you can also send messages to your care team and make appointments. If you have questions, please call your primary care clinic.  If you do not have a primary care provider, please call [phone] and they will assist you.        Care EveryWhere ID     This is your Care EveryWhere ID. This could be used by other organizations to access your [place] medical records  [id number]        Your Vitals Were     Pulse Temperature Last Period Pulse Oximetry BMI (Body Mass Index)       89 98.4  F (36.9  C) (Oral) 07/29/2013 99% 36.78 kg/m2        Blood Pressure from Last 3 Encounters:   07/16/18 112/76   07/10/18 122/78   06/29/18 130/86    Weight from Last 3  Encounters:   07/16/18 100.2 kg (221 lb)   07/10/18 101.2 kg (223 lb)   06/29/18 101.3 kg (223 lb 6.4 oz)              We Performed the Following     BIOPSY SKIN/SUBQ/MUC MEM, SINGLE LESION     SPECIMEN PATHOLOGY ([facility])     SURGICAL TRAY - MAJOR        Primary Care Provider Office Phone # Fax #    Nata Rodriguez [phone] [address] [facility] [address]  [facility] [address]        Equal Access to Services     [facility] : Hadii aad ku hadasho Soomaali, waaxda luqadaha, qaybta kaalmada adeegyada, waxay idiin hayaan adeeg judy stephen . So [facility] [phone].    ATENCIÓN: Si habla español, tiene a braxton disposición servicios gratuitos de asistencia lingüística. [facility] [phone].    We comply with applicable federal civil rights laws and [place] laws. We do not discriminate on the basis of race, color, national origin, age, disability, sex, sexual orientation, or gender identity.            Thank you!     Thank you for choosing [facility]  for your care. Our goal is always to provide you with excellent care. Hearing back from our patients is one way we can continue to improve our services. Please take a few minutes to complete the written survey that you may receive in the mail after your visit with us. Thank you!             Your Updated Medication List - Protect others around you: Learn how to safely use, store and throw away your medicines at www.disposemymeds.org.          This list is accurate as of 7/16/18 11:59 PM.  Always use your most recent med list.                   Brand Name Dispense Instructions for use Diagnosis    BENADRYL DECONGESTANT 25-60 MG Tabs   Generic drug:  Diphenhydramine-Pseudoephed      1 TABLET EVERY 6 HOURS AS NEEDED        clobetasol 0.05 % cream    TEMOVATE    30 g    Apply sparingly to affected area twice daily as needed.  Do not apply to face.    Pruritus of genital organs       fluticasone 50 MCG/ACT spray    FLONASE    3  Bottle    USE 1-2 SPRAYS INTO BOTH NOSTRILS ONCE DAILY    Chronic rhinitis       gabapentin 300 MG capsule    NEURONTIN    270 capsule    TAKE ONE CAPUSULE BY MOUTH EVERY MORNING AND TWO CAPSULES DAILY BEFORE BED.    Neuropathic pain syndrome (non-herpetic)       ibuprofen 200 MG tablet    ADVIL/MOTRIN     1 TABLET EVERY 4 TO 6 HOURS AS NEEDED        MULTIVITAMIN PO      None Entered        omeprazole 20 MG CR capsule    priLOSEC    30 capsule    TAKE ONE CAPSULE BY MOUTH EVERY DAY    Marcel's ring       order for DME     2 Device    Equipment being ordered: 1)  short CAM walker/ pneumatic  2)  trilok ankle brace    Foot pain, right, Tendinitis of right peroneus brevis tendon       TOPAMAX PO           TUMS 500 MG chewable tablet   Generic drug:  calcium carbonate      1 TABLET 3 TIMES DAILY        zolpidem 10 MG tablet    AMBIEN    30 tablet    Take 0.5 tablets (5 mg) by mouth nightly as needed for sleep    Insomnia, unspecified

## 2018-07-16 NOTE — NURSING NOTE
Ana Luisa is here to have a spot on her big toe biopsied.    Pre-visit Screening:  Immunizations:  up to date  Colonoscopy:  is up to date  Mammogram: is up to date  Asthma Action Test/Plan:  NA  PHQ9:  NA  GAD7:  NA  Questioned patient about current smoking habits Pt. quit smoking some time ago.  Ok to leave detailed message on voice mail for today's visit only Yes, phone # [phone]

## 2018-07-16 NOTE — PROGRESS NOTES
SUBJECTIVE:  Patient who has been under the care of a vascular surgeon, presents for skin biopsy of a dark lesion 5mm pad of right great toe,    Vascular surgeon requested biopsy    No history of foreign body  Lesion present for about a week to patient's knowledge  Non painful   Patient desires to have it removed.               Risks and benefits of the procedure were        discussed with the patient and verbal consent was        obtained.  The area was cleaned with an alcohol swab        and anesthesia, 1% lidocaine without        epinephrine, was administered using about 0.5cc.  Area was then prepped with betadine.         Dermablade biopsy was attained including        all of the lesion margins.        Hemostasis was        Achieved with pressure.  Bacitracin and bandage were applied.               A:  Dark lesion - biopsy with Dermablade              P:  Keep covered with band-aide and bacitracin for 2        days.  Proper wound care and healing progression was        discussed with the patient.  Wound care handout was        given to the patient.  Monitor for erythema, purulent        discharge, and wound dehiscence and return earlier if        this occurs. Follow-up as needed- no sutures to remove  Patient was in        agreement and will be notified of the biopsy results.    She also wishes evaluation of shortness of breath  I encouraged that she make another appointment for evaluation.

## 2018-07-30 ENCOUNTER — TRANSFERRED RECORDS (OUTPATIENT)
Dept: FAMILY MEDICINE | Facility: CLINIC | Age: 58
End: 2018-07-30

## 2018-08-17 ENCOUNTER — MYC MEDICAL ADVICE (OUTPATIENT)
Dept: FAMILY MEDICINE | Facility: CLINIC | Age: 58
End: 2018-08-17

## 2018-08-18 NOTE — TELEPHONE ENCOUNTER
From: Ana Luisa Rao  To: Nata Rodriguez MD  Sent: 8/17/2018 5:05 PM CDT  Subject: Do I need an appointment?    Hi Dr. Rodriguez,    Can you please interpret my echo report and tell me if you think I need to be seen by a cardiologist? I read the report that your staff gave me when I was in your office, and see mention that the left atrium is mildly dilated and there is mild mitral valve and trace tricuspid valve regurgitation, I'm just not sure if that requires any follow up. I messaged Dr. Lezama (Vascular Dr. who ordered it) a while ago but he never replied to my Rodati message or my phone call.    Thank you,  Ana Luisa Rao

## 2018-10-01 DIAGNOSIS — M54.50 LOW BACK PAIN: ICD-10-CM

## 2018-10-01 DIAGNOSIS — M54.16 LUMBAR RADICULOPATHY: ICD-10-CM

## 2018-10-01 DIAGNOSIS — G61.9 INFLAMMATORY AND TOXIC NEUROPATHY (H): ICD-10-CM

## 2018-10-01 DIAGNOSIS — T43.505A NEUROLEPTIC-INDUCED ACUTE AKATHISIA: Primary | ICD-10-CM

## 2018-10-01 DIAGNOSIS — G25.0 BENIGN ESSENTIAL TREMOR: ICD-10-CM

## 2018-10-01 DIAGNOSIS — G25.71 NEUROLEPTIC-INDUCED ACUTE AKATHISIA: Primary | ICD-10-CM

## 2018-10-01 DIAGNOSIS — G56.00 CARPAL TUNNEL SYNDROME: ICD-10-CM

## 2018-10-01 DIAGNOSIS — G62.2 INFLAMMATORY AND TOXIC NEUROPATHY (H): ICD-10-CM

## 2018-10-01 LAB — CK SERPL-CCNC: 49 U/L (ref 30–225)

## 2018-10-01 PROCEDURE — 86256 FLUORESCENT ANTIBODY TITER: CPT | Mod: 90 | Performed by: PSYCHIATRY & NEUROLOGY

## 2018-10-01 PROCEDURE — 82088 ASSAY OF ALDOSTERONE: CPT | Mod: 90 | Performed by: PSYCHIATRY & NEUROLOGY

## 2018-10-01 PROCEDURE — 36415 COLL VENOUS BLD VENIPUNCTURE: CPT | Performed by: PSYCHIATRY & NEUROLOGY

## 2018-10-01 PROCEDURE — 82550 ASSAY OF CK (CPK): CPT | Performed by: PSYCHIATRY & NEUROLOGY

## 2018-10-01 PROCEDURE — 99000 SPECIMEN HANDLING OFFICE-LAB: CPT | Performed by: PSYCHIATRY & NEUROLOGY

## 2018-10-02 LAB — ALDOST SERPL-MCNC: 8.9 NG/DL

## 2018-10-15 ENCOUNTER — TRANSFERRED RECORDS (OUTPATIENT)
Dept: FAMILY MEDICINE | Facility: CLINIC | Age: 58
End: 2018-10-15

## 2018-10-17 ENCOUNTER — TRANSFERRED RECORDS (OUTPATIENT)
Dept: HEALTH INFORMATION MANAGEMENT | Facility: CLINIC | Age: 58
End: 2018-10-17

## 2018-10-19 LAB — LAB SCANNED RESULT: NORMAL

## 2018-10-22 ENCOUNTER — TRANSFERRED RECORDS (OUTPATIENT)
Dept: FAMILY MEDICINE | Facility: CLINIC | Age: 58
End: 2018-10-22

## 2018-10-23 ENCOUNTER — OFFICE VISIT (OUTPATIENT)
Dept: PEDIATRICS | Facility: CLINIC | Age: 58
End: 2018-10-23
Payer: COMMERCIAL

## 2018-10-23 ENCOUNTER — TELEPHONE (OUTPATIENT)
Dept: PEDIATRICS | Facility: CLINIC | Age: 58
End: 2018-10-23

## 2018-10-23 VITALS
DIASTOLIC BLOOD PRESSURE: 78 MMHG | HEART RATE: 84 BPM | WEIGHT: 224.8 LBS | OXYGEN SATURATION: 99 % | SYSTOLIC BLOOD PRESSURE: 120 MMHG | TEMPERATURE: 97.6 F | BODY MASS INDEX: 37.45 KG/M2 | HEIGHT: 65 IN

## 2018-10-23 DIAGNOSIS — F51.01 PRIMARY INSOMNIA: ICD-10-CM

## 2018-10-23 DIAGNOSIS — Z23 NEED FOR PROPHYLACTIC VACCINATION AND INOCULATION AGAINST INFLUENZA: ICD-10-CM

## 2018-10-23 DIAGNOSIS — M79.7 FIBROMYALGIA: ICD-10-CM

## 2018-10-23 DIAGNOSIS — Z11.4 SCREENING FOR HIV (HUMAN IMMUNODEFICIENCY VIRUS): ICD-10-CM

## 2018-10-23 DIAGNOSIS — M79.2 NEUROPATHIC PAIN SYNDROME (NON-HERPETIC): ICD-10-CM

## 2018-10-23 DIAGNOSIS — K22.2 SCHATZKI'S RING: ICD-10-CM

## 2018-10-23 DIAGNOSIS — Z00.00 ROUTINE HISTORY AND PHYSICAL EXAMINATION OF ADULT: Primary | ICD-10-CM

## 2018-10-23 DIAGNOSIS — R05.9 COUGH: ICD-10-CM

## 2018-10-23 DIAGNOSIS — E66.01 MORBID OBESITY (H): ICD-10-CM

## 2018-10-23 DIAGNOSIS — M25.50 ARTHRALGIA, UNSPECIFIED JOINT: ICD-10-CM

## 2018-10-23 DIAGNOSIS — L30.9 DERMATITIS: ICD-10-CM

## 2018-10-23 PROCEDURE — 90471 IMMUNIZATION ADMIN: CPT | Performed by: INTERNAL MEDICINE

## 2018-10-23 PROCEDURE — 99213 OFFICE O/P EST LOW 20 MIN: CPT | Mod: 25 | Performed by: INTERNAL MEDICINE

## 2018-10-23 PROCEDURE — 90682 RIV4 VACC RECOMBINANT DNA IM: CPT | Performed by: INTERNAL MEDICINE

## 2018-10-23 PROCEDURE — 99396 PREV VISIT EST AGE 40-64: CPT | Mod: 25 | Performed by: INTERNAL MEDICINE

## 2018-10-23 RX ORDER — ZOLPIDEM TARTRATE 5 MG/1
5 TABLET ORAL
Qty: 30 TABLET | Refills: 2 | Status: SHIPPED | OUTPATIENT
Start: 2018-10-23 | End: 2019-04-21

## 2018-10-23 ASSESSMENT — ENCOUNTER SYMPTOMS
ABDOMINAL PAIN: 1
HEADACHES: 1
HEMATURIA: 0
HEARTBURN: 1
CHILLS: 0
BREAST MASS: 0
FREQUENCY: 1
MYALGIAS: 1
NAUSEA: 1
HEMATOCHEZIA: 0
WEAKNESS: 1
ARTHRALGIAS: 1
EYE PAIN: 0
SHORTNESS OF BREATH: 1
PALPITATIONS: 1
SORE THROAT: 0
DYSURIA: 0
NERVOUS/ANXIOUS: 0
FEVER: 0
DIZZINESS: 1
PARESTHESIAS: 1
DIARRHEA: 0
JOINT SWELLING: 1
COUGH: 1
CONSTIPATION: 0

## 2018-10-23 NOTE — MR AVS SNAPSHOT
After Visit Summary   10/23/2018    Ana Luisa Rao    MRN: [id number]           Patient Information     Date Of Birth          1960        Visit Information        Provider Department      10/23/2018 1:50 PM Gabriela Saenz MD [facility]        Today's Diagnoses     Routine history and physical examination of adult    -  1    Screening for HIV (human immunodeficiency virus)        Need for prophylactic vaccination and inoculation against influenza        Cough        Arthralgia, unspecified joint        Neuropathic pain syndrome (non-herpetic)        Fibromyalgia        Morbid obesity (H)        Dermatitis        Schatzki's ring        Primary insomnia          Care Instructions      Preventive Health Recommendations  Female Ages 50 - 64    Yearly exam: See your health care provider every year in order to  o Review health changes.   o Discuss preventive care.    o Review your medicines if your doctor has prescribed any.      Get a Pap test every three years (unless you have an abnormal result and your provider advises testing more often).    If you get Pap tests with HPV test, you only need to test every 5 years, unless you have an abnormal result.       Have a mammogram every 1 to 2 years.    Have a colonoscopy in June    Have a cholesterol test every 5 years, or more often if advised.    Have a diabetes test (fasting glucose) every three years. If you are at risk for diabetes, you should have this test more often.     Shots: Get a flu shot each year. Get a tetanus shot every 10 years - you are due in June.    Nutrition:     Eat at least 5 servings of fruits and vegetables each day.    Eat whole-grain bread, whole-wheat pasta and brown rice instead of white grains and rice.    Get adequate Calcium and Vitamin D.     Lifestyle    Exercise at least 150 minutes a week (30 minutes a day, 5 days a week). This will help you control your weight and prevent disease.    Limit alcohol to one  drink per day.    No smoking.     Wear sunscreen to prevent skin cancer.     See your dentist every six months for an exam and cleaning.    See your eye doctor every 1 to 2 years.    Please try OTC compression stockings to see if that helps your legs without the discomfort.  [employer] sells sockwells.  Amazon also sells some as dose [employer].    Follow-up with OB-gyn for your pap and evaluation of the genital sores.    Try increasing your omeprazole to TWICE DAILY - if you are not better in 1 month then let me know and I will refer you to GI.                Follow-ups after your visit        Additional Services     DERMATOLOGY REFERRAL       Your provider has referred you to: [facility]: Patch Testing - [place] [phone]   [email or web address]      Please note: Nurse evaluation needed prior to scheduling patch testing.  Reason for testing: dermatitis - recommended by [facility]  Known prior allergies: none    Proposed patch test series Specialist recommendation    Please specify any patient specific products to test:   Skin atopy:YES  Rhinitis/Sinusitis:YES  Hand eczema: YES  Profession: lab supervisor  Hobbies: painting and crafting      Please be aware that coverage of these services is subject to the terms and limitations of your health insurance plan.  Call member services at your health plan with any benefit or coverage questions.      Please bring the following with you to your appointment:    (1) Any X-Rays, CTs or MRIs which have been performed.  Contact the facility where they were done to arrange for  prior to your scheduled appointment.    (2) List of current medications  (3) This referral request   (4) Any documents/labs given to you for this referral            RHEUMATOLOGY REFERRAL       Your provider has referred you to: [facility]:  [facility]   [phone] [email or web address]  Possible polychronditis - see info from [facility]    Please  be aware that coverage of these services is subject to the terms and limitations of your health insurance plan.  Call member services at your health plan with any benefit or coverage questions.      Please bring the following with you to your appointment:    (1) Any X-Rays, CTs or MRIs which have been performed.  Contact the facility where they were done to arrange for  prior to your scheduled appointment.    (2) List of current medications   (3) This referral request   (4) Any documents/labs given to you for this referral                  Follow-up notes from your care team     Return in about 1 year (around 10/23/2019) for Physical Exam.      Future tests that were ordered for you today     Open Future Orders        Priority Expected Expires Ordered    Sputum Culture Aerobic Bacterial Routine  11/22/2018 10/23/2018    Gram stain Routine  12/22/2018 10/23/2018    Fungus Culture, non-blood Routine  10/23/2019 10/23/2018            Who to contact     If you have questions or need follow up information about today's clinic visit or your schedule please contact [facility] directly at [phone].  Normal or non-critical lab and imaging results will be communicated to you by MyChart, letter or phone within 4 business days after the clinic has received the results. If you do not hear from us within 7 days, please contact the clinic through [facility] or phone. If you have a critical or abnormal lab result, we will notify you by phone as soon as possible.  Submit refill requests through [employer] or call your pharmacy and they will forward the refill request to us. Please allow 3 business days for your refill to be completed.          Additional Information About Your Visit        [employer] Information     [employer] gives you secure access to your electronic health record. If you see a primary care provider, you can also send messages to your care team and make appointments. If you have questions, please call  "your primary care clinic.  If you do not have a primary care provider, please call [phone] and they will assist you.        Care EveryWhere ID     This is your Care EveryWhere ID. This could be used by other organizations to access your [facility] medical records  [id number]        Your Vitals Were     Pulse Temperature Height Last Period Pulse Oximetry BMI (Body Mass Index)    84 97.6  F (36.4  C) (Oral) 5' 5\" (1.651 m) 07/29/2013 99% 37.41 kg/m2       Blood Pressure from Last 3 Encounters:   10/23/18 120/78   07/16/18 112/76   07/10/18 122/78    Weight from Last 3 Encounters:   10/23/18 224 lb 12.8 oz (102 kg)   07/16/18 221 lb (100.2 kg)   07/10/18 223 lb (101.2 kg)              We Performed the Following     DERMATOLOGY REFERRAL     FLU VACCINE, (RIV4) RECOMBINANT HA  , IM (FluBlok, egg free) [29102]- >18 YRS (FMG recommended  50-64 YRS)     RHEUMATOLOGY REFERRAL     Vaccine Administration, Initial [40148]          Today's Medication Changes          These changes are accurate as of 10/23/18  3:06 PM.  If you have any questions, ask your nurse or doctor.               Start taking these medicines.        Dose/Directions    omeprazole 20 MG CR capsule   Commonly known as:  priLOSEC   Used for:  Schatzki's ring   Started by:  Gabriela Saenz MD        Dose:  20 mg   Take 1 capsule (20 mg) by mouth 2 times daily   Quantity:  180 capsule   Refills:  3         These medicines have changed or have updated prescriptions.        Dose/Directions    gabapentin 300 MG capsule   Commonly known as:  NEURONTIN   This may have changed:  additional instructions   Used for:  Neuropathic pain syndrome (non-herpetic)        TAKE ONE CAPUSULE BY MOUTH EVERY MORNING AND TWO CAPSULES DAILY BEFORE BED.   Quantity:  270 capsule   Refills:  3       zolpidem 5 MG tablet   Commonly known as:  AMBIEN   This may have changed:  medication strength   Used for:  Primary insomnia   Changed by:  Gabriela Saenz MD        Dose:  5 mg   Take " 1 tablet (5 mg) by mouth nightly as needed for sleep   Quantity:  30 tablet   Refills:  2            Where to get your medicines      These medications were sent to [facility] [place], [place] - [address] [address] [facility]  [address] [address] [address]     Phone:  [phone]     omeprazole 20 MG CR capsule         Some of these will need a paper prescription and others can be bought over the counter.  Ask your nurse if you have questions.     Bring a paper prescription for each of these medications     zolpidem 5 MG tablet                Primary Care Provider Office Phone # Fax #    Nata Rodriguez [phone] [address] [address]        Equal Access to Services     MARIJA BARCENAS : Hadii itzel putnam hadasho Soomaali, waaxda luqadaha, qaybta kaalmada adeegyada, madhu stephen . So [facility] [phone].    ATENCIÓN: Si habla español, tiene a braxton disposición servicios gratuitos de asistencia lingüística. [facility] [phone].    We comply with applicable federal civil rights laws and [place] laws. We do not discriminate on the basis of race, color, national origin, age, disability, sex, sexual orientation, or gender identity.            Thank you!     Thank you for choosing [facility]  for your care. Our goal is always to provide you with excellent care. Hearing back from our patients is one way we can continue to improve our services. Please take a few minutes to complete the written survey that you may receive in the mail after your visit with us. Thank you!             Your Updated Medication List - Protect others around you: Learn how to safely use, store and throw away your medicines at www.disposemymeds.org.          This list is accurate as of 10/23/18  3:06 PM.  Always use your most recent med list.                   Brand Name Dispense Instructions for use Diagnosis    BENADRYL DECONGESTANT 25-60  MG Tabs   Generic drug:  Diphenhydramine-Pseudoephed      1 TABLET EVERY 6 HOURS AS NEEDED        clobetasol 0.05 % cream    TEMOVATE    30 g    Apply sparingly to affected area twice daily as needed.  Do not apply to face.    Pruritus of genital organs       fluticasone 50 MCG/ACT spray    FLONASE    3 Bottle    USE 1-2 SPRAYS INTO BOTH NOSTRILS ONCE DAILY    Chronic rhinitis       gabapentin 300 MG capsule    NEURONTIN    270 capsule    TAKE ONE CAPUSULE BY MOUTH EVERY MORNING AND TWO CAPSULES DAILY BEFORE BED.    Neuropathic pain syndrome (non-herpetic)       ibuprofen 200 MG tablet    ADVIL/MOTRIN     1 TABLET EVERY 4 TO 6 HOURS AS NEEDED        MULTIVITAMIN PO      None Entered        omeprazole 20 MG CR capsule    priLOSEC    180 capsule    Take 1 capsule (20 mg) by mouth 2 times daily    Schatzki's ring       TOPAMAX PO           TUMS 500 MG chewable tablet   Generic drug:  calcium carbonate      1 TABLET 3 TIMES DAILY        zolpidem 5 MG tablet    AMBIEN    30 tablet    Take 1 tablet (5 mg) by mouth nightly as needed for sleep    Primary insomnia

## 2018-10-23 NOTE — PATIENT INSTRUCTIONS
Preventive Health Recommendations  Female Ages 50 - 64    Yearly exam: See your health care provider every year in order to  o Review health changes.   o Discuss preventive care.    o Review your medicines if your doctor has prescribed any.      Get a Pap test every three years (unless you have an abnormal result and your provider advises testing more often).    If you get Pap tests with HPV test, you only need to test every 5 years, unless you have an abnormal result.       Have a mammogram every 1 to 2 years.    Have a colonoscopy in June    Have a cholesterol test every 5 years, or more often if advised.    Have a diabetes test (fasting glucose) every three years. If you are at risk for diabetes, you should have this test more often.     Shots: Get a flu shot each year. Get a tetanus shot every 10 years - you are due in June.    Nutrition:     Eat at least 5 servings of fruits and vegetables each day.    Eat whole-grain bread, whole-wheat pasta and brown rice instead of white grains and rice.    Get adequate Calcium and Vitamin D.     Lifestyle    Exercise at least 150 minutes a week (30 minutes a day, 5 days a week). This will help you control your weight and prevent disease.    Limit alcohol to one drink per day.    No smoking.     Wear sunscreen to prevent skin cancer.     See your dentist every six months for an exam and cleaning.    See your eye doctor every 1 to 2 years.    Please try OTC compression stockings to see if that helps your legs without the discomfort.  [employer] sells sockwells.  Amazon also sells some as dose [employer].    Follow-up with OB-gyn for your pap and evaluation of the genital sores.    Try increasing your omeprazole to TWICE DAILY - if you are not better in 1 month then let me know and I will refer you to GI.

## 2018-10-23 NOTE — PROGRESS NOTES
SUBJECTIVE:   CC: Ana Luisa Rao is an 58 year old woman who presents for preventive health visit as new patient with multiple health issue - records reviewed in [facility].     Physical   Annual:     Getting at least 3 servings of Calcium per day:  Yes    Bi-annual eye exam:  Yes    Dental care twice a year:  Yes    Sleep apnea or symptoms of sleep apnea:  Daytime drowsiness and Sleep apnea    Diet:  Regular (no restrictions)    Frequency of exercise:  None    Taking medications regularly:  Yes    Additional concerns today:  YES    Dyspnea - undergoing lung evaluation    Joint complaints - saw rheum at [place].  Wearing compression stockings sometimes with benefit but get tight at knees and then bothersome so stops wearing.    Neuropathy - seeing Dr. Aguirre for evaluation      Insomnia - uses ambien rarely.    Abd pain - after eats, stomach feels like it is empty.  Upper abd pain that moves around.  Always has nausea.  EGD 18 months ago normal.    Today's PHQ-2 Score:   PHQ-2 ( 1999 Pfizer) 10/23/2018   Q1: Little interest or pleasure in doing things 0   Q2: Feeling down, depressed or hopeless 0   PHQ-2 Score 0   Q1: Little interest or pleasure in doing things Not at all   Q2: Feeling down, depressed or hopeless Not at all   PHQ-2 Score 0       Abuse: Current or Past(Physical, Sexual or Emotional)- No  Do you feel safe in your environment - Yes    Social History   Substance Use Topics     Smoking status: Former Smoker     Years: 10.00     Quit date: 1/1/2006     Smokeless tobacco: Never Used      Comment: quit 2006     Alcohol use 0.5 oz/week     1 Standard drinks or equivalent per week      Comment: once a month      Alcohol Use 10/23/2018   If you drink alcohol do you typically have greater than 3 drinks per day OR greater than 7 drinks per week? No       Reviewed orders with patient.  Reviewed health maintenance and updated orders accordingly - Yes  Labs reviewed in [facility]    Patient over age 50, mutual decision to  "screen reflected in health maintenance.    Pertinent mammograms are reviewed under the imaging tab.  History of abnormal Pap smear: NO - age 30- 65 PAP every 3 years recommended then 5 yrs when HPV normal     Reviewed and updated as needed this visit by clinical staff  Tobacco  Allergies  Meds  Problems  Med Hx  Surg Hx  Fam Hx  Soc Hx          Reviewed and updated as needed this visit by Provider  Allergies  Meds  Problems            Review of Systems   Constitutional: Negative for chills and fever.   HENT: Negative for congestion, ear pain, hearing loss and sore throat.    Eyes: Positive for visual disturbance. Negative for pain.   Respiratory: Positive for cough and shortness of breath.    Cardiovascular: Positive for chest pain, palpitations and peripheral edema.   Gastrointestinal: Positive for abdominal pain, heartburn and nausea. Negative for constipation, diarrhea and hematochezia.   Breasts:  Negative for tenderness, breast mass and discharge.   Genitourinary: Positive for frequency, genital sores and urgency. Negative for dysuria, hematuria, pelvic pain, vaginal bleeding and vaginal discharge.   Musculoskeletal: Positive for arthralgias, joint swelling and myalgias.   Skin: Positive for rash.   Neurological: Positive for dizziness, weakness, headaches and paresthesias.   Psychiatric/Behavioral: The patient is not nervous/anxious.    stomach pains have worsened in past month.  Cramp in right calf - doesn't hurt, won't go away.       OBJECTIVE:   /78 (BP Location: Right arm, Patient Position: Chair, Cuff Size: Adult Regular)  Pulse 84  Temp 97.6  F (36.4  C) (Oral)  Ht 5' 5\" (1.651 m)  Wt 224 lb 12.8 oz (102 kg)  LMP 07/29/2013  SpO2 99%  BMI 37.41 kg/m2  Physical Exam  GENERAL: healthy, alert and no distress  EYES: Eyes grossly normal to inspection, PERRL and conjunctivae and sclerae normal  HENT: ear canals and TM's normal, nose and mouth without ulcers or lesions  NECK: no " adenopathy, no asymmetry, masses, or scars and thyroid normal to palpation  RESP: lungs clear to auscultation - no rales, rhonchi or wheezes  CV: regular rate and rhythm, normal S1 S2, no S3 or S4, no murmur, click or rub, no peripheral edema and peripheral pulses strong  ABDOMEN: soft, nontender, no hepatosplenomegaly, no masses and bowel sounds normal  MS: no gross musculoskeletal defects noted, no edema  SKIN: no suspicious lesions.  lebron palms with erythematous patch with white scale  NEURO: Normal strength and tone, mentation intact and speech normal  PSYCH: mentation appears normal, affect normal/bright      ASSESSMENT/PLAN:   1. Routine history and physical examination of adult  Routine health education discussed: calcium, diet, exercise, weight, safety. Defers pap to next year.    2. Screening for HIV (human immunodeficiency virus)  Declines after discussion of risks/benefits    3. Need for prophylactic vaccination and inoculation against influenza    - FLU VACCINE, (RIV4) RECOMBINANT HA  , IM (FluBlok, egg free) [73947]- >18 YRS (FMG recommended  50-64 YRS)  - Vaccine Administration, Initial [72674]    4. Cough  Sputum orders per pulmonary and follow-up with them  - Sputum Culture Aerobic Bacterial; Future  - Gram stain; Future  - Fungus Culture, non-blood; Future    5. Arthralgia, unspecified joint  Follow-up with rheumatology.  Discussed possibility of polychondritis  - RHEUMATOLOGY REFERRAL    6. Neuropathic pain syndrome (non-herpetic)  Refer to rheum  - RHEUMATOLOGY REFERRAL    7. Fibromyalgia  Discussed - unclear how much this is the cause of pain.  Follow-up with rheum    8. Morbid obesity (H)  Discussed healthy diet and increase activity as able    9. Dermatitis  Discussed skin care including changing to dye-free, perfume-free soaps, detergents and dryer sheets and regular use of hypoallergenic moisturizer. Refer to derm  - DERMATOLOGY REFERRAL    10. Schatzki's ring  PPI per orders - increase to  "twice daily, recent EGD unremarkable.  Notify if symptoms uncontrolled  - omeprazole (PRILOSEC) 20 MG CR capsule; Take 1 capsule (20 mg) by mouth 2 times daily  Dispense: 180 capsule; Refill: 3    11. Primary insomnia  Discussed medication use  - zolpidem (AMBIEN) 5 MG tablet; Take 1 tablet (5 mg) by mouth nightly as needed for sleep  Dispense: 30 tablet; Refill: 2    COUNSELING:  Reviewed preventive health counseling, as reflected in patient instructions    BP Readings from Last 1 Encounters:   10/23/18 120/78     Estimated body mass index is 37.41 kg/(m^2) as calculated from the following:    Height as of this encounter: 5' 5\" (1.651 m).    Weight as of this encounter: 224 lb 12.8 oz (102 kg).    BP Screening:   Last 3 BP Readings:    BP Readings from Last 3 Encounters:   10/23/18 120/78   07/16/18 112/76   07/10/18 122/78       The following was recommended to the patient:  Re-screen BP within a year and recommended lifestyle modifications  Weight management plan: Discussed healthy diet and exercise guidelines and patient will follow up in 12 months in clinic to re-evaluate.     reports that she quit smoking about 12 years ago. She quit after 10.00 years of use. She has never used smokeless tobacco.      Counseling Resources:  ATP IV Guidelines  Pooled Cohorts Equation Calculator  Breast Cancer Risk Calculator  FRAX Risk Assessment  ICSI Preventive Guidelines  Dietary Guidelines for Americans, 2010  USDA's MyPlate  ASA Prophylaxis  Lung CA Screening    Gabriela Saenz MD  [facility] [place]    Injectable Influenza Immunization Documentation    1.  Is the person to be vaccinated sick today?   No    2. Does the person to be vaccinated have an allergy to a component   of the vaccine?   No  Egg Allergy Algorithm Link    3. Has the person to be vaccinated ever had a serious reaction   to influenza vaccine in the past?   No    4. Has the person to be vaccinated ever had Guillain-Barré syndrome?   No    Form " completed by pt

## 2018-10-24 NOTE — TELEPHONE ENCOUNTER
Central Prior Authorization Team   Phone: [phone]    PA Initiation    Medication: omeprazole 20mg  Insurance Company: [employer] - Phone [phone] Fax [phone]  Pharmacy Filling the Rx: [facility] [place], [place] - [address] [facility]  Filling Pharmacy Phone: [phone]  Filling Pharmacy Fax:    Start Date: 10/24/2018

## 2018-10-26 NOTE — TELEPHONE ENCOUNTER
Prior Authorization Approval    Authorization Effective Date: 10/24/2018  Authorization Expiration Date: 10/24/2019  Medication: omeprazole 20mg  Approved Dose/Quantity:    Reference #:     Insurance Company: [employer] - Phone [phone] Fax [phone]  Expected CoPay:       CoPay Card Available:      Foundation Assistance Needed:    Which Pharmacy is filling the prescription (Not needed for infusion/clinic administered): [facility] [place], [place] - [address] [facility]  Pharmacy Notified: Yes  Patient Notified: Yes

## 2018-10-27 ENCOUNTER — HEALTH MAINTENANCE LETTER (OUTPATIENT)
Age: 58
End: 2018-10-27

## 2018-11-15 ENCOUNTER — TELEPHONE (OUTPATIENT)
Dept: DERMATOLOGY | Facility: CLINIC | Age: 58
End: 2018-11-15

## 2018-12-18 DIAGNOSIS — R05.9 COUGH: ICD-10-CM

## 2018-12-18 PROCEDURE — 87205 SMEAR GRAM STAIN: CPT | Performed by: INTERNAL MEDICINE

## 2018-12-19 LAB
BACTERIA SPEC CULT: ABNORMAL
GRAM STN SPEC: ABNORMAL
Lab: ABNORMAL
SPECIMEN SOURCE: ABNORMAL
SPECIMEN SOURCE: ABNORMAL

## 2019-01-11 NOTE — PROGRESS NOTES
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   Family history of possible vasculitis and perforated colon but biopsy was negative for vasculitis.     CBC within normal limits; Creat within normal limits   Sed rate normal.   RESHMA neg. MARIELA panel neg. dsDNA antibody neg.   Vasculitis panel negative.   RF neg.   Centromere antibody neg.   Sugey-1 antibody neg.     Derm- seen [facility] skin care doctors; clobetasol cream PRN. Aveno PRN has helped. Left dorsal aspect of foot - scaly lesion appears to be psoriatic. Follow up with derm for possible biopsy.     Patient seen in [facility] only recently. We discussed that I agree with their assessment and recommendations.     We discussed that there is no clearcut evidence of Sjogrens. Unclear what is the reason for her chronic dry eyes and dry mouth. RESHMA, SSA and SSB sjogren's antibodies are normal. Lip biopsy would not change the management at this point. Dry eyes- continue systane drops PRN. Dry mouth- try biotene mouthspray.     Patient is on Gabapentin 300mg at dinner time and 900mg at bed time. It helps with restless feet. This likely helps with her fibromyalgia also but patient not sure as she has been on it for some time.  It helps her sleep at night. I would recommend continuing gabapentin at this time. Fibromyalgia- continue to follow up with PCP.     Proximal leg  pain- chronic but will get xray to make sure there is no bone lesion.     DIP joint swelling in hand. Likely developing osteoarthritis.   No suspicion for psoriatic arthritis based on clinical presentation.     Follow up with rheumatology PRN.     The labs from patient records are reviewed.     I will be back in touch with the patient through mychart/letter when results are available.     Patient agrees with the above mentioned treatment plan.     Most Recent Immunizations   Administered Date(s) Administered     HEPA 09/29/2011     Influenza Intranasal Vaccine 4 valent 10/22/2015     Influenza Quad, Recombinant, p-free (RIV4) 10/23/2018     Influenza Vaccine IM 3yrs+ 4 Valent IIV4 09/18/2017     TDAP Vaccine (Boostrix) 06/04/2009       Orders Placed This Encounter   Procedures     X-ray lt Tibia & Fibula 2 Views       Data Unavailable    There are no discontinued medications.  Current Outpatient Medications   Medication Sig Dispense Refill     BENADRYL DECONGESTANT 25-60 MG OR TABS 1 TABLET EVERY 6 HOURS AS NEEDED       clobetasol (TEMOVATE) 0.05 % cream Apply sparingly to affected area twice daily as needed.  Do not apply to face. 30 g 0     fluticasone (FLONASE) 50 MCG/ACT spray USE 1-2 SPRAYS INTO BOTH NOSTRILS ONCE DAILY (Patient taking differently: USE 1-2 SPRAYS INTO BOTH NOSTRILS ONCE DAILY prn) 3 Bottle 3     gabapentin (NEURONTIN) 300 MG capsule TAKE ONE CAPUSULE BY MOUTH EVERY MORNING AND TWO CAPSULES DAILY BEFORE BED. (Patient taking differently: Takes 4 tablets at bedtime) 270 capsule 3     hydrocortisone 2.5 % lotion        IBUPROFEN 200 MG OR TABS 1 TABLET EVERY 4 TO 6 HOURS AS NEEDED       MULTIVITAMIN OR None Entered       omeprazole (PRILOSEC) 20 MG CR capsule Take 1 capsule (20 mg) by mouth 2 times daily 180 capsule 3     polyethylene glycol 0.4%- propylene glycol 0.3% (SYSTANE ULTRA) 0.4-0.3 % SOLN ophthalmic solution Apply 1 drop to eye as needed       triamcinolone (KENALOG) 0.1 % external cream  Apply 1 Application topically 2 times daily as needed       TUMS 500 MG OR CHEW 1 TABLET 3 TIMES DAILY       zolpidem (AMBIEN) 5 MG tablet Take 1 tablet (5 mg) by mouth nightly as needed for sleep 30 tablet 2     Topiramate (TOPAMAX PO)          Godwin Mccloud MD  [facility]          Active Problem List:     Patient Active Problem List    Diagnosis Date Noted     Foot pain, right 06/20/2018     Priority: Medium     Morbid obesity (H) 06/20/2018     Priority: Medium     ACP (advance care planning) 03/21/2016     Priority: Medium     Advance Care Planning 3/21/2016: ACP Review of Chart / Resources Provided:  Reviewed chart for advance care plan.  Ana Luisa DORA Rao has no plan or code status on file. Discussed available resources and provided with information. Confirmed code status reflects current choices pending further ACP discussions.  Confirmed/documented legally designated decision makers.  Added by Dang Soto             Low back pain 06/08/2015     Priority: Medium     Lumbar degenerative disc disease 06/08/2015     Priority: Medium     Posttraumatic stress disorder 02/21/2013     Priority: Medium     Fibromyalgia 08/29/2010     Priority: Medium     Neuropathic pain syndrome (non-herpetic) 07/17/2008     Priority: Medium     Vitamin D deficiency 07/17/2008     Priority: Medium     Allergic rhinitis 07/17/2008     Priority: Medium     Problem list name updated by automated process. Provider to review       Esophageal Reflux/ Schatzke's ring/ PPI long term 07/17/2008     Priority: Medium     Health Care Home 02/20/2013     Priority: Low     State Tier Level:  Tier 1  Status:  NA  Care Coordinator:        See Letters for [facility] Care Plan                  History of Present Illness:     Chief Complaint   Patient presents with     Establish Care     Referral     Gabriela Saenz MD     January 11, 2019  Have you ever seen a rheumatologist yes Who Dr. Yee Uriarte  When 9/5/18 at [facility]  Joint pain  history  Onset: Patient is here to establish care for a follow up. Things do not seem to be getting better. New sx include finger swelling since 10/2018  Involved joints: all over  Pain scale:  3/10     Wakes the patient from sleep : Yes  Morning stiffness:Yes for 10 minutes  Meds used:ibuprofen     Interim history  Since last visit:  1. Infections - No  2. New symptoms/medical problem - Yes, having SOB as well as the finger swelling  3. Any side effects from Rheum medications -none  3. ER visits/Hospitalizations/surgeries - No  4. Last PCP visit: 10/23/18    Wt Readings from Last 4 Encounters:   19 105.2 kg (232 lb)   10/23/18 102 kg (224 lb 12.8 oz)   18 100.2 kg (221 lb)   07/10/18 101.2 kg (223 lb)     Chest pain on and off; - evaluated by MD  Shortness of breath on and off- evaluated by MD    No h/o fevers   No h/o gout  No clear family or personal history of psoriasis, ulcerative colitis or chron's disease. No h/o iritis.  Patient denies any raynauds  No h/o seizures     BP Readings from Last 3 Encounters:   19 132/80   10/23/18 120/78   18 112/76          Review of Systems:   Complete ROS negative except for symptoms mentioned in the HPI          Past Medical History:     Past Medical History:   Diagnosis Date     Fibromyalgia syndrome      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     Past Surgical History:   Procedure Laterality Date     ENDOSCOPY  ,     dysphagia     UPPER GI ENDOSCOPY  2013    GERD/ Schatzke's ring     UPPER GI ENDOSCOPY  2017    Schatzkies ring            Social History:     Social History     Occupational History     Occupation: lab clerk     Employer: [employer]   Tobacco Use     Smoking status: Former Smoker     Years: 10.00     Last attempt to quit: 2006     Years since quittin.0     Smokeless tobacco: Never Used     Tobacco comment: quit    Substance and Sexual Activity     Alcohol use: Yes     Alcohol/week:  "0.5 oz     Types: 1 Standard drinks or equivalent per week     Comment: once a month      Drug use: No     Sexual activity: Yes     Partners: Male     Comment: vas            Family History:     Family History   Problem Relation Age of Onset     Connective Tissue Disorder Mother         \"vasculitis\"     Hypertension Father      Cancer - colorectal Maternal Uncle         73     Blood Disease Brother         hemocrhomatosis     Breast Cancer No family hx of      Diabetes No family hx of             Allergies:     Allergies   Allergen Reactions     Penicillins             Medications:     Current Outpatient Medications   Medication Sig Dispense Refill     BENADRYL DECONGESTANT 25-60 MG OR TABS 1 TABLET EVERY 6 HOURS AS NEEDED       clobetasol (TEMOVATE) 0.05 % cream Apply sparingly to affected area twice daily as needed.  Do not apply to face. 30 g 0     fluticasone (FLONASE) 50 MCG/ACT spray USE 1-2 SPRAYS INTO BOTH NOSTRILS ONCE DAILY (Patient taking differently: USE 1-2 SPRAYS INTO BOTH NOSTRILS ONCE DAILY prn) 3 Bottle 3     gabapentin (NEURONTIN) 300 MG capsule TAKE ONE CAPUSULE BY MOUTH EVERY MORNING AND TWO CAPSULES DAILY BEFORE BED. (Patient taking differently: Takes 4 tablets at bedtime) 270 capsule 3     hydrocortisone 2.5 % lotion        IBUPROFEN 200 MG OR TABS 1 TABLET EVERY 4 TO 6 HOURS AS NEEDED       MULTIVITAMIN OR None Entered       omeprazole (PRILOSEC) 20 MG CR capsule Take 1 capsule (20 mg) by mouth 2 times daily 180 capsule 3     polyethylene glycol 0.4%- propylene glycol 0.3% (SYSTANE ULTRA) 0.4-0.3 % SOLN ophthalmic solution Apply 1 drop to eye as needed       triamcinolone (KENALOG) 0.1 % external cream Apply 1 Application topically 2 times daily as needed       TUMS 500 MG OR CHEW 1 TABLET 3 TIMES DAILY       zolpidem (AMBIEN) 5 MG tablet Take 1 tablet (5 mg) by mouth nightly as needed for sleep 30 tablet 2     Topiramate (TOPAMAX PO)               Physical Exam:   Blood pressure 132/80, pulse " "76, temperature 98.1  F (36.7  C), temperature source Oral, height 1.651 m (5' 5\"), weight 105.2 kg (232 lb), last menstrual period 07/29/2013, SpO2 100 %, not currently breastfeeding.  Wt Readings from Last 4 Encounters:   01/14/19 105.2 kg (232 lb)   10/23/18 102 kg (224 lb 12.8 oz)   07/16/18 100.2 kg (221 lb)   07/10/18 101.2 kg (223 lb)       Constitutional: well-developed, appearing stated age; cooperative  Eyes: normal conjunctiva, sclera  ENT: nl external ears, nose, lips.No mucous membrane lesions, normal saliva pool  Neck: no cervical lymphadenopathy  Resp: lungs clear to auscultation in the bases,   CV: RRR, no added sounds  GI: Abdomen soft and no tenderness  : not tested  Lymph: no cervical, supraclavicular or epitrochlear nodes  MS:   Right second finger- bony DIP swelling noted.   Bilateral proximal leg local mild tenderness to touch.   All shoulder, elbow, wrist, MCP/PIP/DIP, hip, knee, ankle, and foot MTP/IP joints were examined and  found without active synovitis or major deformity. Full ROM.  No dactylitis,  tenosynovitis, enthesopathy.  Skin: no rash in exposed areas  Psych: nl judgement, orientation, memory, affect.         Data:         Godwin Mccloud MD    [facility]    "

## 2019-01-14 ENCOUNTER — OFFICE VISIT (OUTPATIENT)
Dept: RHEUMATOLOGY | Facility: CLINIC | Age: 59
End: 2019-01-14
Payer: COMMERCIAL

## 2019-01-14 ENCOUNTER — ANCILLARY PROCEDURE (OUTPATIENT)
Dept: GENERAL RADIOLOGY | Facility: CLINIC | Age: 59
End: 2019-01-14
Payer: COMMERCIAL

## 2019-01-14 VITALS
HEART RATE: 76 BPM | SYSTOLIC BLOOD PRESSURE: 132 MMHG | DIASTOLIC BLOOD PRESSURE: 80 MMHG | HEIGHT: 65 IN | TEMPERATURE: 98.1 F | BODY MASS INDEX: 38.65 KG/M2 | WEIGHT: 232 LBS | OXYGEN SATURATION: 100 %

## 2019-01-14 DIAGNOSIS — M79.605 PAIN IN BOTH LOWER EXTREMITIES: ICD-10-CM

## 2019-01-14 DIAGNOSIS — M79.604 PAIN IN BOTH LOWER EXTREMITIES: Primary | ICD-10-CM

## 2019-01-14 DIAGNOSIS — M79.604 PAIN IN BOTH LOWER EXTREMITIES: ICD-10-CM

## 2019-01-14 DIAGNOSIS — M79.605 PAIN IN BOTH LOWER EXTREMITIES: Primary | ICD-10-CM

## 2019-01-14 PROCEDURE — 73590 X-RAY EXAM OF LOWER LEG: CPT | Mod: LT

## 2019-01-14 PROCEDURE — 99204 OFFICE O/P NEW MOD 45 MIN: CPT | Performed by: INTERNAL MEDICINE

## 2019-01-14 RX ORDER — HYDROCORTISONE 25 MG/ML
LOTION TOPICAL
COMMUNITY
Start: 2018-12-26 | End: 2022-01-21

## 2019-01-14 RX ORDER — TRIAMCINOLONE ACETONIDE 1 MG/G
1 CREAM TOPICAL 2 TIMES DAILY PRN
COMMUNITY
Start: 2018-09-10 | End: 2019-10-16

## 2019-01-14 ASSESSMENT — ROUTINE ASSESSMENT OF PATIENT INDEX DATA (RAPID3)
TOTAL RAPID3 SCORE: 7.3
RAPID3 INTERPRETATION: MODERATE 6.1-12.0

## 2019-01-14 ASSESSMENT — MIFFLIN-ST. JEOR: SCORE: 1643.23

## 2019-01-14 NOTE — NURSING NOTE
"Chief Complaint   Patient presents with     Establish Care     Referral     Gabriela Saenz MD       Initial /80   Pulse 76   Temp 98.1  F (36.7  C) (Oral)   Ht 1.651 m (5' 5\")   Wt 105.2 kg (232 lb)   LMP 07/29/2013   SpO2 100%   BMI 38.61 kg/m   Estimated body mass index is 38.61 kg/m  as calculated from the following:    Height as of this encounter: 1.651 m (5' 5\").    Weight as of this encounter: 105.2 kg (232 lb).  Medication Reconciliation: complete    Have you ever seen a rheumatologist yes Who Dr. Yee Uriarte  When 9/5/18 at [facility]  Joint pain history  Onset: Patient is here to establish care for a follow up. Things do not seem to be getting better. New sx include finger swelling since 10/2018  Involved joints: all over  Pain scale:  3/10     Wakes the patient from sleep : Yes  Morning stiffness:Yes for 10 minutes  Meds used:ibuprofen    Interim history  Since last visit:  1. Infections - No  2. New symptoms/medical problem - Yes, having SOB as well as the finger swelling  3. Any side effects from Rheum medications -none  3. ER visits/Hospitalizations/surgeries - No  4. Last PCP visit: 10/23/18  Wt Readings from Last 4 Encounters:   01/14/19 105.2 kg (232 lb)   10/23/18 102 kg (224 lb 12.8 oz)   07/16/18 100.2 kg (221 lb)   07/10/18 101.2 kg (223 lb)     BP Readings from Last 3 Encounters:   01/14/19 132/80   10/23/18 120/78   07/16/18 112/76       "

## 2019-01-15 ENCOUNTER — TRANSFERRED RECORDS (OUTPATIENT)
Dept: FAMILY MEDICINE | Facility: CLINIC | Age: 59
End: 2019-01-15

## 2019-01-16 NOTE — RESULT ENCOUNTER NOTE
[facility]  No bony lesions noted in the area of pain. Good.       Sincerely    Godwin Mccloud MD  [facility]

## 2019-02-13 ENCOUNTER — MEDICAL CORRESPONDENCE (OUTPATIENT)
Dept: HEALTH INFORMATION MANAGEMENT | Facility: CLINIC | Age: 59
End: 2019-02-13

## 2019-02-13 DIAGNOSIS — R21 RASH: Primary | ICD-10-CM

## 2019-02-13 LAB
ALBUMIN SERPL-MCNC: 4 G/DL (ref 3.4–5)
ALP SERPL-CCNC: 117 U/L (ref 40–150)
ALT SERPL W P-5'-P-CCNC: 58 U/L (ref 0–50)
ANION GAP SERPL CALCULATED.3IONS-SCNC: 4 MMOL/L (ref 3–14)
AST SERPL W P-5'-P-CCNC: 38 U/L (ref 0–45)
BASOPHILS # BLD AUTO: 0 10E9/L (ref 0–0.2)
BASOPHILS NFR BLD AUTO: 0.6 %
BILIRUB SERPL-MCNC: 0.4 MG/DL (ref 0.2–1.3)
BUN SERPL-MCNC: 14 MG/DL (ref 7–30)
CALCIUM SERPL-MCNC: 8.9 MG/DL (ref 8.5–10.1)
CHLORIDE SERPL-SCNC: 105 MMOL/L (ref 94–109)
CO2 SERPL-SCNC: 29 MMOL/L (ref 20–32)
CREAT SERPL-MCNC: 0.57 MG/DL (ref 0.52–1.04)
DIFFERENTIAL METHOD BLD: NORMAL
EOSINOPHIL # BLD AUTO: 0.1 10E9/L (ref 0–0.7)
EOSINOPHIL NFR BLD AUTO: 1.8 %
ERYTHROCYTE [DISTWIDTH] IN BLOOD BY AUTOMATED COUNT: 12.3 % (ref 10–15)
GFR SERPL CREATININE-BSD FRML MDRD: >90 ML/MIN/{1.73_M2}
GLUCOSE SERPL-MCNC: 106 MG/DL (ref 70–99)
HCT VFR BLD AUTO: 39.1 % (ref 35–47)
HGB BLD-MCNC: 13.1 G/DL (ref 11.7–15.7)
LYMPHOCYTES # BLD AUTO: 2.1 10E9/L (ref 0.8–5.3)
LYMPHOCYTES NFR BLD AUTO: 31 %
MAGNESIUM SERPL-MCNC: 2.2 MG/DL (ref 1.6–2.3)
MCH RBC QN AUTO: 30.4 PG (ref 26.5–33)
MCHC RBC AUTO-ENTMCNC: 33.5 G/DL (ref 31.5–36.5)
MCV RBC AUTO: 91 FL (ref 78–100)
MONOCYTES # BLD AUTO: 0.4 10E9/L (ref 0–1.3)
MONOCYTES NFR BLD AUTO: 5.3 %
NEUTROPHILS # BLD AUTO: 4.2 10E9/L (ref 1.6–8.3)
NEUTROPHILS NFR BLD AUTO: 61.3 %
PLATELET # BLD AUTO: 231 10E9/L (ref 150–450)
POTASSIUM SERPL-SCNC: 3.6 MMOL/L (ref 3.4–5.3)
PROT SERPL-MCNC: 7.6 G/DL (ref 6.8–8.8)
RBC # BLD AUTO: 4.31 10E12/L (ref 3.8–5.2)
SODIUM SERPL-SCNC: 138 MMOL/L (ref 133–144)
WBC # BLD AUTO: 6.8 10E9/L (ref 4–11)

## 2019-02-13 PROCEDURE — 85025 COMPLETE CBC W/AUTO DIFF WBC: CPT | Performed by: INTERNAL MEDICINE

## 2019-02-13 PROCEDURE — 80053 COMPREHEN METABOLIC PANEL: CPT | Performed by: INTERNAL MEDICINE

## 2019-02-13 PROCEDURE — 83735 ASSAY OF MAGNESIUM: CPT | Performed by: INTERNAL MEDICINE

## 2019-02-13 PROCEDURE — 86480 TB TEST CELL IMMUN MEASURE: CPT | Performed by: INTERNAL MEDICINE

## 2019-02-13 PROCEDURE — 36415 COLL VENOUS BLD VENIPUNCTURE: CPT | Performed by: INTERNAL MEDICINE

## 2019-02-15 LAB
GAMMA INTERFERON BACKGROUND BLD IA-ACNC: 0.02 IU/ML
M TB IFN-G BLD-IMP: NEGATIVE
M TB IFN-G CD4+ BCKGRND COR BLD-ACNC: >10 IU/ML
MITOGEN IGNF BCKGRD COR BLD-ACNC: 0 IU/ML
MITOGEN IGNF BCKGRD COR BLD-ACNC: 0 IU/ML

## 2019-04-21 DIAGNOSIS — F51.01 PRIMARY INSOMNIA: ICD-10-CM

## 2019-04-22 NOTE — TELEPHONE ENCOUNTER
Requested Prescriptions   Pending Prescriptions Disp Refills     zolpidem (AMBIEN) 5 MG tablet [Pharmacy Med Name: ZOLPIDEM TARTRATE 5MG TABS] 30 tablet 1     Sig: TAKE ONE TABLET BY MOUTH EVERY NIGHT AT BEDTIME AS NEEDED FOR SLEEP       There is no refill protocol information for this order        Last Written Prescription Date:  10/23/18  Last Fill Quantity: 30,  # refills: 2    Last office visit: 10/23/2018 with prescribing provider:  Gabriela Saenz MD        Future Office Visit:

## 2019-04-23 RX ORDER — ZOLPIDEM TARTRATE 5 MG/1
TABLET ORAL
Qty: 30 TABLET | Refills: 1 | Status: SHIPPED | OUTPATIENT
Start: 2019-04-23 | End: 2021-11-23

## 2019-04-23 NOTE — TELEPHONE ENCOUNTER
** reviewed, last refilled 1/1/19 for #30**  Getting gabapentin from Dr. Aguirre ( mentioned at the office visit too, PCP aware)    Marlin Ugarte RN  Message handled by Nurse Triage.

## 2019-05-17 ENCOUNTER — OFFICE VISIT (OUTPATIENT)
Dept: FAMILY MEDICINE | Facility: CLINIC | Age: 59
End: 2019-05-17

## 2019-05-17 VITALS
BODY MASS INDEX: 38.77 KG/M2 | RESPIRATION RATE: 20 BRPM | HEART RATE: 70 BPM | WEIGHT: 233 LBS | OXYGEN SATURATION: 98 % | SYSTOLIC BLOOD PRESSURE: 136 MMHG | DIASTOLIC BLOOD PRESSURE: 82 MMHG

## 2019-05-17 DIAGNOSIS — R10.11 RUQ ABDOMINAL PAIN: ICD-10-CM

## 2019-05-17 DIAGNOSIS — R82.90 ABNORMAL URINE ODOR: ICD-10-CM

## 2019-05-17 DIAGNOSIS — J31.0 CHRONIC RHINITIS: ICD-10-CM

## 2019-05-17 DIAGNOSIS — G89.4 CHRONIC PAIN SYNDROME: ICD-10-CM

## 2019-05-17 LAB
ALBUMIN (URINE): ABNORMAL MG/DL
APPEARANCE UR: CLEAR
BACTERIA, UR: ABNORMAL
BILIRUB UR QL: ABNORMAL
CASTS/LPF: ABNORMAL
COLOR UR: YELLOW
EP/HPF: ABNORMAL
GLUCOSE URINE: ABNORMAL MG/DL
HGB UR QL: ABNORMAL
KETONES UR QL: ABNORMAL MG/DL
LEUKOCYTE ESTERASE - QUEST: ABNORMAL
MISC.: ABNORMAL
NITRITE UR QL STRIP: ABNORMAL
PH UR STRIP: 7.5 PH (ref 5–7)
RBC, UR MICRO: ABNORMAL (ref ?–2)
SP. GRAVITY: 1.01
UROBILINOGEN UR QL STRIP: 0.2 EU/DL (ref 0.2–1)
WBC, UR MICRO: ABNORMAL (ref ?–2)

## 2019-05-17 PROCEDURE — 81001 URINALYSIS AUTO W/SCOPE: CPT | Performed by: FAMILY MEDICINE

## 2019-05-17 PROCEDURE — 99214 OFFICE O/P EST MOD 30 MIN: CPT | Performed by: FAMILY MEDICINE

## 2019-05-17 RX ORDER — FLUTICASONE PROPIONATE 50 MCG
SPRAY, SUSPENSION (ML) NASAL
Qty: 16 G | Refills: 11 | Status: SHIPPED | OUTPATIENT
Start: 2019-05-17

## 2019-05-17 RX ORDER — BETAMETHASONE DIPROPIONATE 0.5 MG/G
CREAM TOPICAL
COMMUNITY
Start: 2019-04-30 | End: 2021-11-10

## 2019-05-17 RX ORDER — FLUCONAZOLE 150 MG/1
TABLET ORAL
COMMUNITY
Start: 2019-03-19 | End: 2019-06-21

## 2019-05-17 RX ORDER — CLINDAMYCIN PHOSPHATE 11.9 MG/ML
SOLUTION TOPICAL
COMMUNITY
Start: 2018-09-10 | End: 2021-04-02

## 2019-05-17 RX ORDER — DOXYCYCLINE 100 MG/1
CAPSULE ORAL
COMMUNITY
Start: 2019-03-19 | End: 2021-06-18

## 2019-05-17 NOTE — PROGRESS NOTES
SUBJECTIVE:   Ana Luisa Rao is a 59 year old female who presents to clinic today for the following   health issues:    Pain under right breast area- started a year ago, intermittent, now more constant    Strong odor to urine (first mentioned by her grandchild)  No dysuria  No vaginal discharge.  No fever or chills  No back pain  She wonders if she has a urinary tract infection      Specialists involved in Care  [facility]/PEDS  [place] LUNG: asthma ruled out-   Rheumatology  General Surgery: varicose veins  Neurologist: Dr gAuirre: bee sting pains/ benign essential tremor  Dermatology: peeling skin / scaly rash hands and feet- 300 mg once a week of fluticonazole  Opthalmology: dry eye- Sjogren's?  Has difficulty driving when dark    Chronic complaints:  Painful shoulder and hips  Pain under right breast  Sometimes has mild elevation in liver function test  She reports High myoglobin- [place] labs reviewed    Has a history of costochondritis   Legs feel like a tourniquet is on them, legs feel dead- has seen doctors for years (Mother had similar symptoms)    [place]- review notes    CT chest: mild air trapping 9/10/2019  Multiple small nodules, less than 6 mm  With previous smoking history, follow up CT chest without contrast recommended in 12 months  Repeat 9/2019    Social history: 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      Additional history: as documented    Reviewed  and updated as needed this visit by clinical staff         Reviewed and updated as needed this visit by Provider         Patient Active Problem List   Diagnosis     Neuropathic pain syndrome (non-herpetic)     Vitamin D deficiency     Allergic rhinitis     Esophageal Reflux/ Schatzke's ring/ PPI long term     Fibromyalgia     Health Care Home     Posttraumatic stress disorder     Low back pain     Lumbar degenerative disc disease     ACP (advance care planning)     Foot pain, right     Morbid obesity (H)     Past Surgical History:   Procedure  "Laterality Date     ENDOSCOPY  2008    dysphagia     UPPER GI ENDOSCOPY  2013    GERD/ Schatzke's ring     UPPER GI ENDOSCOPY  2017    Schatzkies ring       Social History     Tobacco Use     Smoking status: Former Smoker     Years: 10.00     Last attempt to quit: 2006     Years since quittin.3     Smokeless tobacco: Never Used     Tobacco comment: quit    Substance Use Topics     Alcohol use: Yes     Alcohol/week: 0.5 oz     Types: 1 Standard drinks or equivalent per week     Comment: once a month      Family History   Problem Relation Age of Onset     Connective Tissue Disorder Mother         \"vasculitis\"     Hypertension Father      Cancer - colorectal Maternal Uncle         73     Blood Disease Brother         hemocrhomatosis     Breast Cancer No family hx of      Diabetes No family hx of          Current Outpatient Medications   Medication Sig Dispense Refill     augmented betamethasone dipropionate (DIPROLENE-AF) 0.05 % external cream        BENADRYL DECONGESTANT 25-60 MG OR TABS 1 TABLET EVERY 6 HOURS AS NEEDED       clindamycin (CLEOCIN T) 1 % external solution        clobetasol (TEMOVATE) 0.05 % cream Apply sparingly to affected area twice daily as needed.  Do not apply to face. 30 g 0     doxycycline monohydrate (MONODOX) 100 MG capsule        fluconazole (DIFLUCAN) 150 MG tablet        fluticasone (FLONASE) 50 MCG/ACT nasal spray USE 1-2 SPRAYS INTO BOTH NOSTRILS ONCE DAILY 16 g 11     gabapentin (NEURONTIN) 300 MG capsule TAKE ONE CAPUSULE BY MOUTH EVERY MORNING AND TWO CAPSULES DAILY BEFORE BED. (Patient taking differently: Takes 4 tablets at bedtime) 270 capsule 3     hydrocortisone 2.5 % lotion        IBUPROFEN 200 MG OR TABS 1 TABLET EVERY 4 TO 6 HOURS AS NEEDED       MULTIVITAMIN OR None Entered       omeprazole (PRILOSEC) 20 MG CR capsule Take 1 capsule (20 mg) by mouth 2 times daily 180 capsule 3     polyethylene glycol 0.4%- propylene glycol 0.3% (SYSTANE ULTRA) 0.4-0.3 " "% SOLN ophthalmic solution Apply 1 drop to eye as needed       Topiramate (TOPAMAX PO)        triamcinolone (KENALOG) 0.1 % external cream Apply 1 Application topically 2 times daily as needed       TUMS 500 MG OR CHEW 1 TABLET 3 TIMES DAILY       zolpidem (AMBIEN) 5 MG tablet TAKE ONE TABLET BY MOUTH EVERY NIGHT AT BEDTIME AS NEEDED FOR SLEEP 30 tablet 1          ROS: 10 point ROS neg other than the symptoms noted above in the HPI.       BMI:   Estimated body mass index is 38.77 kg/m  as calculated from the following:    Height as of 1/14/19: 1.651 m (5' 5\").    Weight as of this encounter: 105.7 kg (233 lb).   Weight management plan: Discussed healthy diet and exercise guidelines    OBJECTIVE:  /82 (BP Location: Right arm, Patient Position: Sitting, Cuff Size: Adult Large)   Pulse 70   Resp 20   Wt 105.7 kg (233 lb)   LMP 07/29/2013   SpO2 98%   BMI 38.77 kg/m      No acute distress  She sits with her legs crossed- spine rotation   External ears  and canals clear bilaterally. TM's normal bilaterally. Nose normal without lesions or discharge. Oropharynx normal. Neck supple without palpable adenopathy.  Regular rate and  rhythm. S1 and S2 normal, no murmurs, clicks, gallops or rubs. No edema or JVD. Chest is clear; no wheezes or rales.  No chest wall pain with palpation  The abdomen is soft without tenderness, guarding, mass or organomegaly. Bowel sounds are normal. No CVA tenderness.  UA: negative    Assessment    (J31.0) Chronic rhinitis  Comment: refill   Plan: fluticasone (FLONASE) 50 MCG/ACT nasal spray            (R82.90) Abnormal urine odor  Comment: no UTI  ? bacterial vaginosis - discussed increasing intake of yogurt- lactobacilli  Plan: HCL  Urinalysis, Routine (BFP)            (R10.11) RUQ abdominal pain  Comment:   Plan: US Abdomen Complete             (G89.4) Chronic pain syndrome  Comment:    Plan:  Consider a trial duloxetine and pool therapy    Nata Rodriguez MD  [facility] " [facility]

## 2019-05-17 NOTE — NURSING NOTE
Chief Complaint   Patient presents with     Pain     pain under right breast area, started a year ago, on and off at first, now becoming more consistent     UTI     strong odor, has no history of UTI's but has had one before and had the strong odor in urine     Pre-visit Screening:  Immunizations:  up to date  Colonoscopy:  is up to date  Mammogram: is up to date  Asthma Action Test/Plan:  NA  PHQ9:  NA  GAD7:  NA  Questioned patient about current smoking habits Pt. quit smoking some time ago.  Ok to leave detailed message on voice mail for today's visit only Yes, phone # [phone]

## 2019-05-20 ENCOUNTER — MYC MEDICAL ADVICE (OUTPATIENT)
Dept: FAMILY MEDICINE | Facility: CLINIC | Age: 59
End: 2019-05-20

## 2019-05-21 PROBLEM — R06.02 SHORTNESS OF BREATH: Status: ACTIVE | Noted: 2019-05-21

## 2019-05-22 ENCOUNTER — OFFICE VISIT (OUTPATIENT)
Dept: CARDIOLOGY | Facility: CLINIC | Age: 59
End: 2019-05-22
Payer: COMMERCIAL

## 2019-05-22 VITALS
HEIGHT: 65 IN | HEART RATE: 84 BPM | DIASTOLIC BLOOD PRESSURE: 78 MMHG | WEIGHT: 230 LBS | SYSTOLIC BLOOD PRESSURE: 118 MMHG | BODY MASS INDEX: 38.32 KG/M2

## 2019-05-22 DIAGNOSIS — I87.2 VENOUS (PERIPHERAL) INSUFFICIENCY: ICD-10-CM

## 2019-05-22 DIAGNOSIS — R06.02 SHORTNESS OF BREATH: Primary | ICD-10-CM

## 2019-05-22 DIAGNOSIS — Z13.6 CARDIOVASCULAR SCREENING; LDL GOAL LESS THAN 130: ICD-10-CM

## 2019-05-22 DIAGNOSIS — R00.2 PALPITATIONS: ICD-10-CM

## 2019-05-22 DIAGNOSIS — E66.01 MORBID OBESITY (H): ICD-10-CM

## 2019-05-22 PROCEDURE — 99204 OFFICE O/P NEW MOD 45 MIN: CPT | Performed by: INTERNAL MEDICINE

## 2019-05-22 ASSESSMENT — MIFFLIN-ST. JEOR: SCORE: 1629.15

## 2019-05-22 NOTE — PROGRESS NOTES
HPI and Plan:   I had the pleasure of seeing Ana Luisa López in cardiology consultation for symptoms of shortness of breath some palpitations and leg edema.    Patient has been having intermittent shortness of breath for months to years.  This is waxing and waning.  It occurs sometimes when she is sitting or lying down.  Sometimes with activity.  She also has flutter-like sensation and palpitations that is intermittent.  She tells me that along with that she has intermittent abdominal pain and cough.  Recently, she was traveling and was near [facility] and she had the shortness of breath and cough with phlegm when in the emergency room.  She also had some abdominal pain.  Lab work reasonably except for slightly elevated N-terminal proBNP of 238.  Her abdominal ultrasound which is negative for cholecystitis.  EKG was done which showed on her phone revealed no ischemic changes.    Over the years, she has seen several physicians with constellation of these symptoms of abdominal pain, shortness of breath, palpitations, leg edema shoulder pains and joint pains.  She was worked up for autoimmune disease and according to her, antibodies were negative.  Now she also has occasional sensation of somebody injecting warm fluid in her heart.    She was seen in 2003 at our clinic in somewhat similar symptoms were noted but no objective abnormalities were found.    She had an echocardiogram last year which showed normal LV systolic function with no valvular disease.    She has reportedly been told as having mitral valve prolapse years ago but then it was not confirmed.    Her mother had some panel vessel disease.  Her brother has hemochromatosis and she is carrier.    Her hemoglobin has been normal and so has been her ferritin.    Physical exam    Below    Impression    1.  Intermittent shortness of breath  She wants to rule out cardiac etiology which is reasonable.  She also has some palpitations.  I recommended doing a ZIO Patch  monitor to assess for any arrhythmia.  We will also do a stress MRI to rule out ischemia as cause of her shortness of breath.  With the MRI we can look for assessment of iron deposition in the heart with doing a T2 star.  It is likely to be normal.  With respect to cardiovascular screening, she will benefit from coronary calcium score and I discussed the utility of that.  She does have a low HDL of 39.  There is no history of hypertension.  Her LDL was 102.  She understands that the calcium score is self-pay and does involve some radiation.    2.  Leg edema, venous insufficiency  She saw a vascular medicine doctor in vascular department and had an ultrasound for competency which shows the right greater saphenous vein being incompetent.  She was advised compression stockings but was unable to use it because it was painful for her.  I recommended that she see the vascular medicine doctor again to consider venous ablation and if she is a candidate for it    3.  Dyslipidemia with low HDL  Recommended low saturated fat diet with increased exercise and consider losing weight which may improve her HDL    She will return to see my nurse practitioner in follow-up for the above test.  If no significant abnormalities found, she can return to her primary care physician and be discharged from our clinic.  She might need some pulmonary work-up for her shortness of breath.  That can be done by her primary care physician.  Slight elevated N-terminal proBNP is not surprising given her weight.    Thank you for allowing us to participate in the care of this nice patient    Sincerely,    Rajinder Coffey MD        Orders Placed This Encounter   Procedures     MRI Cardiac w/contrast and stress     CT Coronary Calcium Scan     Follow-Up with Cardiac Advanced Practice Provider     Zio Patch Holter Adult Pediatric Greater than 48 hrs       No orders of the defined types were placed in this encounter.      There are no discontinued  medications.      Encounter Diagnoses   Name Primary?     Shortness of breath Yes     Palpitations      CARDIOVASCULAR SCREENING; LDL GOAL LESS THAN 130      Venous (peripheral) insufficiency      Morbid obesity (H)        CURRENT MEDICATIONS:  Current Outpatient Medications   Medication Sig Dispense Refill     augmented betamethasone dipropionate (DIPROLENE-AF) 0.05 % external cream        BENADRYL DECONGESTANT 25-60 MG OR TABS 1 TABLET EVERY 6 HOURS AS NEEDED       clindamycin (CLEOCIN T) 1 % external solution        clobetasol (TEMOVATE) 0.05 % cream Apply sparingly to affected area twice daily as needed.  Do not apply to face. 30 g 0     doxycycline monohydrate (MONODOX) 100 MG capsule        fluconazole (DIFLUCAN) 150 MG tablet        fluticasone (FLONASE) 50 MCG/ACT nasal spray USE 1-2 SPRAYS INTO BOTH NOSTRILS ONCE DAILY 16 g 11     gabapentin (NEURONTIN) 300 MG capsule TAKE ONE CAPUSULE BY MOUTH EVERY MORNING AND TWO CAPSULES DAILY BEFORE BED. (Patient taking differently: Takes 4 tablets at bedtime) 270 capsule 3     hydrocortisone 2.5 % lotion        IBUPROFEN 200 MG OR TABS 1 TABLET EVERY 4 TO 6 HOURS AS NEEDED       MULTIVITAMIN OR None Entered       omeprazole (PRILOSEC) 20 MG CR capsule Take 1 capsule (20 mg) by mouth 2 times daily 180 capsule 3     polyethylene glycol 0.4%- propylene glycol 0.3% (SYSTANE ULTRA) 0.4-0.3 % SOLN ophthalmic solution Apply 1 drop to eye as needed       TUMS 500 MG OR CHEW 1 TABLET 3 TIMES DAILY PRN       zolpidem (AMBIEN) 5 MG tablet TAKE ONE TABLET BY MOUTH EVERY NIGHT AT BEDTIME AS NEEDED FOR SLEEP 30 tablet 1     Topiramate (TOPAMAX PO)        triamcinolone (KENALOG) 0.1 % external cream Apply 1 Application topically 2 times daily as needed         ALLERGIES     Allergies   Allergen Reactions     Penicillins        PAST MEDICAL HISTORY:  Past Medical History:   Diagnosis Date     Fibromyalgia syndrome      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  "      PAST SURGICAL HISTORY:  Past Surgical History:   Procedure Laterality Date     ENDOSCOPY  ,     dysphagia     UPPER GI ENDOSCOPY  2013    GERD/ Schatzke's ring     UPPER GI ENDOSCOPY  2017    Schatzkies ring       FAMILY HISTORY:  Family History   Problem Relation Age of Onset     Connective Tissue Disorder Mother         \"vasculitis\"     Hypertension Father      Cancer - colorectal Maternal Uncle         73     Blood Disease Brother         hemocrhomatosis     Breast Cancer No family hx of      Diabetes No family hx of        SOCIAL HISTORY:  Social History     Socioeconomic History     Marital status:      Spouse name: None     Number of children: None     Years of education: None     Highest education level: None   Occupational History     Occupation: lab clerk     Employer: [employer]     Financial resource strain: None     Food insecurity:     Worry: None     Inability: None     Transportation needs:     Medical: None     Non-medical: None   Tobacco Use     Smoking status: Former Smoker     Packs/day: 0.50     Years: 10.00     Pack years: 5.00     Types: Cigarettes     Start date:      Last attempt to quit: 2006     Years since quittin.3     Smokeless tobacco: Never Used     Tobacco comment: quit    Substance and Sexual Activity     Alcohol use: Yes     Alcohol/week: 0.5 oz     Types: 1 Standard drinks or equivalent per week     Comment: once a month      Drug use: No     Sexual activity: Yes     Partners: Male     Comment: vas   Lifestyle     Physical activity:     Days per week: None     Minutes per session: None     Stress: None   Relationships     Social connections:     Talks on phone: None     Gets together: None     Attends Nondenominational service: None     Active member of club or organization: None     Attends meetings of clubs or organizations: None     Relationship status: None     Intimate partner violence:     Fear of current or ex partner: None " "    Emotionally abused: None     Physically abused: None     Forced sexual activity: None   Other Topics Concern      Service Not Asked     Blood Transfusions Not Asked     Caffeine Concern Not Asked     Occupational Exposure Not Asked     Hobby Hazards Not Asked     Sleep Concern Not Asked     Stress Concern Not Asked     Weight Concern Not Asked     Special Diet Not Asked     Back Care Not Asked     Exercise No     Comment: intermittently     Bike Helmet Not Asked     Seat Belt Yes     Self-Exams Not Asked     Parent/sibling w/ CABG, MI or angioplasty before 65F 55M? Not Asked   Social History Narrative     None       Review of Systems:  Skin:  Positive for rash(hands and feet)     Eyes:  Positive for glasses;contacts    ENT:  Positive for tinnitus dysphagia  Respiratory:  Positive for shortness of breath(supine positions)     Cardiovascular:    Positive for;chest pain;fatigue;edema    Gastroenterology: Positive for heartburn;constipation;diarrhea pain in RUQ  Genitourinary:  Negative      Musculoskeletal:         Neurologic:  Positive for   neuropathy  Psychiatric:  Negative      Heme/Lymph/Imm:  Positive for allergies    Endocrine:  Negative        Physical Exam:  Vitals: /78   Pulse 84   Ht 1.651 m (5' 5\")   Wt 104.3 kg (230 lb)   LMP 07/29/2013   BMI 38.27 kg/m      Constitutional:    overweight      Skin:  warm and dry to the touch          Head:  normocephalic        Eyes:  pupils equal and round        Lymph:No Cervical lymphadenopathy present     ENT:           Neck:  JVP normal        Respiratory:  clear to auscultation         Cardiac: regular rhythm;normal S1 and S2   distant heart sounds no presence of murmur          not assessed this visit                                        GI:  not assessed this visit        Extremities and Muscular Skeletal:        trace;telangiectasia trace;telangiectasia      Neurological:  no gross motor deficits        Psych:  Alert and Oriented x 3  "       CC  No referring provider defined for this encounter.

## 2019-05-22 NOTE — LETTER
5/22/2019    Nata Rodriguez MD  [address] [facility] [address]  [facility] [address]    RE: Ana Luisa Raegonzales       Dear Colleague,    I had the pleasure of seeing Ana Luisa Rao in the [facility].    HPI and Plan:   I had the pleasure of seeing Ana Luisa López in cardiology consultation for symptoms of shortness of breath some palpitations and leg edema.    Patient has been having intermittent shortness of breath for months to years.  This is waxing and waning.  It occurs sometimes when she is sitting or lying down.  Sometimes with activity.  She also has flutter-like sensation and palpitations that is intermittent.  She tells me that along with that she has intermittent abdominal pain and cough.  Recently, she was traveling and was near [facility] and she had the shortness of breath and cough with phlegm when in the emergency room.  She also had some abdominal pain.  Lab work reasonably except for slightly elevated N-terminal proBNP of 238.  Her abdominal ultrasound which is negative for cholecystitis.  EKG was done which showed on her phone revealed no ischemic changes.    Over the years, she has seen several physicians with constellation of these symptoms of abdominal pain, shortness of breath, palpitations, leg edema shoulder pains and joint pains.  She was worked up for autoimmune disease and according to her, antibodies were negative.  Now she also has occasional sensation of somebody injecting warm fluid in her heart.    She was seen in 2003 at our clinic in somewhat similar symptoms were noted but no objective abnormalities were found.    She had an echocardiogram last year which showed normal LV systolic function with no valvular disease.    She has reportedly been told as having mitral valve prolapse years ago but then it was not confirmed.    Her mother had some panel vessel disease.  Her brother has hemochromatosis and she is carrier.    Her hemoglobin has been normal and so  has been her ferritin.    Physical exam    Below    Impression    1.  Intermittent shortness of breath  She wants to rule out cardiac etiology which is reasonable.  She also has some palpitations.  I recommended doing a ZIO Patch monitor to assess for any arrhythmia.  We will also do a stress MRI to rule out ischemia as cause of her shortness of breath.  With the MRI we can look for assessment of iron deposition in the heart with doing a T2 star.  It is likely to be normal.  With respect to cardiovascular screening, she will benefit from coronary calcium score and I discussed the utility of that.  She does have a low HDL of 39.  There is no history of hypertension.  Her LDL was 102.  She understands that the calcium score is self-pay and does involve some radiation.    2.  Leg edema, venous insufficiency  She saw a vascular medicine doctor in vascular department and had an ultrasound for competency which shows the right greater saphenous vein being incompetent.  She was advised compression stockings but was unable to use it because it was painful for her.  I recommended that she see the vascular medicine doctor again to consider venous ablation and if she is a candidate for it    3.  Dyslipidemia with low HDL  Recommended low saturated fat diet with increased exercise and consider losing weight which may improve her HDL    She will return to see my nurse practitioner in follow-up for the above test.  If no significant abnormalities found, she can return to her primary care physician and be discharged from our clinic.  She might need some pulmonary work-up for her shortness of breath.  That can be done by her primary care physician.  Slight elevated N-terminal proBNP is not surprising given her weight.    Thank you for allowing us to participate in the care of this nice patient    Sincerely,    Rajinder Coffey MD        Orders Placed This Encounter   Procedures     MRI Cardiac w/contrast and stress     CT Coronary  Calcium Scan     Follow-Up with Cardiac Advanced Practice Provider     Zio Patch Holter Adult Pediatric Greater than 48 hrs       No orders of the defined types were placed in this encounter.      There are no discontinued medications.      Encounter Diagnoses   Name Primary?     Shortness of breath Yes     Palpitations      CARDIOVASCULAR SCREENING; LDL GOAL LESS THAN 130      Venous (peripheral) insufficiency      Morbid obesity (H)        CURRENT MEDICATIONS:  Current Outpatient Medications   Medication Sig Dispense Refill     augmented betamethasone dipropionate (DIPROLENE-AF) 0.05 % external cream        BENADRYL DECONGESTANT 25-60 MG OR TABS 1 TABLET EVERY 6 HOURS AS NEEDED       clindamycin (CLEOCIN T) 1 % external solution        clobetasol (TEMOVATE) 0.05 % cream Apply sparingly to affected area twice daily as needed.  Do not apply to face. 30 g 0     doxycycline monohydrate (MONODOX) 100 MG capsule        fluconazole (DIFLUCAN) 150 MG tablet        fluticasone (FLONASE) 50 MCG/ACT nasal spray USE 1-2 SPRAYS INTO BOTH NOSTRILS ONCE DAILY 16 g 11     gabapentin (NEURONTIN) 300 MG capsule TAKE ONE CAPUSULE BY MOUTH EVERY MORNING AND TWO CAPSULES DAILY BEFORE BED. (Patient taking differently: Takes 4 tablets at bedtime) 270 capsule 3     hydrocortisone 2.5 % lotion        IBUPROFEN 200 MG OR TABS 1 TABLET EVERY 4 TO 6 HOURS AS NEEDED       MULTIVITAMIN OR None Entered       omeprazole (PRILOSEC) 20 MG CR capsule Take 1 capsule (20 mg) by mouth 2 times daily 180 capsule 3     polyethylene glycol 0.4%- propylene glycol 0.3% (SYSTANE ULTRA) 0.4-0.3 % SOLN ophthalmic solution Apply 1 drop to eye as needed       TUMS 500 MG OR CHEW 1 TABLET 3 TIMES DAILY PRN       zolpidem (AMBIEN) 5 MG tablet TAKE ONE TABLET BY MOUTH EVERY NIGHT AT BEDTIME AS NEEDED FOR SLEEP 30 tablet 1     Topiramate (TOPAMAX PO)        triamcinolone (KENALOG) 0.1 % external cream Apply 1 Application topically 2 times daily as needed    "      ALLERGIES     Allergies   Allergen Reactions     Penicillins        PAST MEDICAL HISTORY:  Past Medical History:   Diagnosis Date     Fibromyalgia syndrome      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       PAST SURGICAL HISTORY:  Past Surgical History:   Procedure Laterality Date     ENDOSCOPY  ,     dysphagia     UPPER GI ENDOSCOPY  2013    GERD/ Schatzke's ring     UPPER GI ENDOSCOPY  2017    Schatzkies ring       FAMILY HISTORY:  Family History   Problem Relation Age of Onset     Connective Tissue Disorder Mother         \"vasculitis\"     Hypertension Father      Cancer - colorectal Maternal Uncle         73     Blood Disease Brother         hemocrhomatosis     Breast Cancer No family hx of      Diabetes No family hx of        SOCIAL HISTORY:  Social History     Socioeconomic History     Marital status:      Spouse name: None     Number of children: None     Years of education: None     Highest education level: None   Occupational History     Occupation:      Employer: [employer]     Financial resource strain: None     Food insecurity:     Worry: None     Inability: None     Transportation needs:     Medical: None     Non-medical: None   Tobacco Use     Smoking status: Former Smoker     Packs/day: 0.50     Years: 10.00     Pack years: 5.00     Types: Cigarettes     Start date:      Last attempt to quit: 2006     Years since quittin.3     Smokeless tobacco: Never Used     Tobacco comment: quit    Substance and Sexual Activity     Alcohol use: Yes     Alcohol/week: 0.5 oz     Types: 1 Standard drinks or equivalent per week     Comment: once a month      Drug use: No     Sexual activity: Yes     Partners: Male     Comment: vas   Lifestyle     Physical activity:     Days per week: None     Minutes per session: None     Stress: None   Relationships     Social connections:     Talks on phone: None     Gets together: None     " "Attends Cheondoism service: None     Active member of club or organization: None     Attends meetings of clubs or organizations: None     Relationship status: None     Intimate partner violence:     Fear of current or ex partner: None     Emotionally abused: None     Physically abused: None     Forced sexual activity: None   Other Topics Concern      Service Not Asked     Blood Transfusions Not Asked     Caffeine Concern Not Asked     Occupational Exposure Not Asked     Hobby Hazards Not Asked     Sleep Concern Not Asked     Stress Concern Not Asked     Weight Concern Not Asked     Special Diet Not Asked     Back Care Not Asked     Exercise No     Comment: intermittently     Bike Helmet Not Asked     Seat Belt Yes     Self-Exams Not Asked     Parent/sibling w/ CABG, MI or angioplasty before 65F 55M? Not Asked   Social History Narrative     None       Review of Systems:  Skin:  Positive for rash(hands and feet)     Eyes:  Positive for glasses;contacts    ENT:  Positive for tinnitus dysphagia  Respiratory:  Positive for shortness of breath(supine positions)     Cardiovascular:    Positive for;chest pain;fatigue;edema    Gastroenterology: Positive for heartburn;constipation;diarrhea pain in RUQ  Genitourinary:  Negative      Musculoskeletal:         Neurologic:  Positive for   neuropathy  Psychiatric:  Negative      Heme/Lymph/Imm:  Positive for allergies    Endocrine:  Negative        Physical Exam:  Vitals: /78   Pulse 84   Ht 1.651 m (5' 5\")   Wt 104.3 kg (230 lb)   LMP 07/29/2013   BMI 38.27 kg/m       Constitutional:    overweight      Skin:  warm and dry to the touch          Head:  normocephalic        Eyes:  pupils equal and round        Lymph:No Cervical lymphadenopathy present     ENT:           Neck:  JVP normal        Respiratory:  clear to auscultation         Cardiac: regular rhythm;normal S1 and S2   distant heart sounds no presence of murmur          not assessed this visit             "                            GI:  not assessed this visit        Extremities and Muscular Skeletal:        trace;telangiectasia trace;telangiectasia      Neurological:  no gross motor deficits        Psych:  Alert and Oriented x 3        CC  No referring provider defined for this encounter.            Thank you for allowing me to participate in the care of your patient.    Sincerely,     Rajinder Coffey MD     [facility]

## 2019-05-29 ENCOUNTER — HOSPITAL ENCOUNTER (OUTPATIENT)
Dept: CT IMAGING | Facility: CLINIC | Age: 59
Discharge: HOME | End: 2019-05-29
Attending: INTERNAL MEDICINE | Admitting: INTERNAL MEDICINE
Payer: COMMERCIAL

## 2019-05-29 ENCOUNTER — HOSPITAL ENCOUNTER (OUTPATIENT)
Dept: CARDIOLOGY | Facility: CLINIC | Age: 59
End: 2019-05-29
Attending: INTERNAL MEDICINE
Payer: COMMERCIAL

## 2019-05-29 DIAGNOSIS — Z13.6 CARDIOVASCULAR SCREENING; LDL GOAL LESS THAN 130: ICD-10-CM

## 2019-05-29 DIAGNOSIS — R06.02 SHORTNESS OF BREATH: ICD-10-CM

## 2019-05-29 DIAGNOSIS — R00.2 PALPITATIONS: ICD-10-CM

## 2019-05-29 PROCEDURE — 0296T ZIO PATCH HOLTER ADULT PEDIATRIC GREATER THAN 48 HRS: CPT

## 2019-05-29 PROCEDURE — 75571 CT HRT W/O DYE W/CA TEST: CPT | Mod: GA

## 2019-05-29 PROCEDURE — 0298T ZIO PATCH HOLTER ADULT PEDIATRIC GREATER THAN 48 HRS: CPT | Performed by: INTERNAL MEDICINE

## 2019-05-29 PROCEDURE — 75571 CT HRT W/O DYE W/CA TEST: CPT | Mod: 26 | Performed by: INTERNAL MEDICINE

## 2019-05-30 ENCOUNTER — MYC MEDICAL ADVICE (OUTPATIENT)
Dept: FAMILY MEDICINE | Facility: CLINIC | Age: 59
End: 2019-05-30

## 2019-05-30 DIAGNOSIS — Z12.31 ENCOUNTER FOR SCREENING MAMMOGRAM FOR BREAST CANCER: Primary | ICD-10-CM

## 2019-05-30 DIAGNOSIS — R93.89 ABNORMAL CT SCAN, CHEST: ICD-10-CM

## 2019-05-30 NOTE — TELEPHONE ENCOUNTER
Soft tissue density in left breast on recent CT  Mammogram recommended  Schedule at breast center [facility] or Maribell Lopez please call to give her to help her schedule

## 2019-05-30 NOTE — TELEPHONE ENCOUNTER
I left a message for patient to call me back.     I will give her:    [facility] [address]  [address]  [place] [address]  [phone] --- appt line

## 2019-05-31 NOTE — TELEPHONE ENCOUNTER
Patient is scheduled for her diagnostic mammogram 6/5/19 @ 1:30pm - Ultrasound left breast 2:00pm    PEGGY

## 2019-05-31 NOTE — TELEPHONE ENCOUNTER
I talked with patient this morning. She will called [facility] ( [phone] ) to schedule her mammogram.     Patient was advised that the order that was placed yesterday was not correct. Needs to be Diagnostic Bilateral with ultrasound.     Please review, complete and sign the pending orders for the diagnostic and ultrasound.     I will call patient back when done. She would like to call to schedule her appt.    Thank you

## 2019-06-03 ENCOUNTER — TRANSFERRED RECORDS (OUTPATIENT)
Dept: FAMILY MEDICINE | Facility: CLINIC | Age: 59
End: 2019-06-03

## 2019-06-04 ENCOUNTER — TRANSFERRED RECORDS (OUTPATIENT)
Dept: HEALTH INFORMATION MANAGEMENT | Facility: CLINIC | Age: 59
End: 2019-06-04

## 2019-06-05 ENCOUNTER — TRANSFERRED RECORDS (OUTPATIENT)
Dept: FAMILY MEDICINE | Facility: CLINIC | Age: 59
End: 2019-06-05

## 2019-06-05 ENCOUNTER — HOSPITAL ENCOUNTER (OUTPATIENT)
Dept: MAMMOGRAPHY | Facility: CLINIC | Age: 59
Discharge: HOME | End: 2019-06-05
Attending: FAMILY MEDICINE | Admitting: FAMILY MEDICINE
Payer: COMMERCIAL

## 2019-06-05 DIAGNOSIS — Z12.31 ENCOUNTER FOR SCREENING MAMMOGRAM FOR BREAST CANCER: ICD-10-CM

## 2019-06-05 DIAGNOSIS — R93.89 ABNORMAL CT SCAN, CHEST: ICD-10-CM

## 2019-06-05 PROCEDURE — 77067 SCR MAMMO BI INCL CAD: CPT

## 2019-06-08 ENCOUNTER — TRANSFERRED RECORDS (OUTPATIENT)
Dept: FAMILY MEDICINE | Facility: CLINIC | Age: 59
End: 2019-06-08

## 2019-06-13 PROCEDURE — 36415 COLL VENOUS BLD VENIPUNCTURE: CPT | Performed by: OTOLARYNGOLOGY

## 2019-06-13 PROCEDURE — 86334 IMMUNOFIX E-PHORESIS SERUM: CPT | Performed by: OTOLARYNGOLOGY

## 2019-06-14 DIAGNOSIS — J34.89 OVERDEVELOPMENT OF NASAL BONES: Primary | ICD-10-CM

## 2019-06-14 DIAGNOSIS — J34.89 OVERDEVELOPMENT OF NASAL BONES: ICD-10-CM

## 2019-06-17 LAB — B2 TRANSFERRIN FLD QL: NEGATIVE

## 2019-06-20 ENCOUNTER — TELEPHONE (OUTPATIENT)
Dept: CARDIOLOGY | Facility: CLINIC | Age: 59
End: 2019-06-20

## 2019-06-20 NOTE — TELEPHONE ENCOUNTER
Reviewed with Pt need for MRI. Pt still having odd symptoms of warmth, and increased HR or palpitations. Pt had 2 short runs SVT on Holter. YUNI Guaman RN

## 2019-06-20 NOTE — TELEPHONE ENCOUNTER
Called and spoke to patient requesting she reschedule her stress cardiac MRI and follow up visit with Vanesa Draper CNP as test has not been completed. Patient asked about recent monitor results that are available in epic. She states she wanted to know those results before proceeding with the stress cMRI. Advised this was part of her follow up 6/24/19 but could speak with Dr. Coffey's team nurse to advise further. Transferred to team 6 nurse.

## 2019-06-21 ENCOUNTER — OFFICE VISIT (OUTPATIENT)
Dept: FAMILY MEDICINE | Facility: CLINIC | Age: 59
End: 2019-06-21

## 2019-06-21 VITALS
OXYGEN SATURATION: 99 % | SYSTOLIC BLOOD PRESSURE: 142 MMHG | DIASTOLIC BLOOD PRESSURE: 88 MMHG | TEMPERATURE: 98 F | WEIGHT: 231.2 LBS | BODY MASS INDEX: 38.47 KG/M2 | HEART RATE: 84 BPM

## 2019-06-21 DIAGNOSIS — R51.9 PERSISTENT HEADACHES: Primary | ICD-10-CM

## 2019-06-21 DIAGNOSIS — R13.12 OROPHARYNGEAL DYSPHAGIA: ICD-10-CM

## 2019-06-21 DIAGNOSIS — R06.02 SHORTNESS OF BREATH: ICD-10-CM

## 2019-06-21 PROCEDURE — 99213 OFFICE O/P EST LOW 20 MIN: CPT | Performed by: PHYSICIAN ASSISTANT

## 2019-06-21 RX ORDER — PREDNISONE 20 MG/1
20 TABLET ORAL 2 TIMES DAILY
Qty: 10 TABLET | Refills: 0 | Status: SHIPPED | OUTPATIENT
Start: 2019-06-21 | End: 2019-07-18

## 2019-06-21 NOTE — PROGRESS NOTES
CC: Headaches    History:  Ana Luisa is here today with headaches that have been intermittent for 3 years or so. Usually the pain is just on right temple, where it feels tender, and can feel general tenderness elsewhere on her scalp as well but not as severe. Also can have pressure near upper right nose that hurts. Usually these pains were happening on an intermittent basis several times per week. However, this past week or so this headache has been there every day, which prompted her to be seen. Headache severity varies anywhere from 2-3/10 pain with 10 being the worst, all the way up to 9/10. Pain never goes away completely. Pain this week is still over right temple and right upper bridge of nose. Has been taking ibuprofen and Sudafed without relief. Went to ENT last week and was told this was not sinuses. The ENT had some concern for a CSF, so they collected sample, and did CT of sinus/brain, both of which were normal. ENT recommended immediate f/u for possible arteritis. Now being aware of this diagnosis, she can think back to 2-3 times where she has had sudden vision changes like blurriness or double vision.     Ana Luisa mentions that she saw Dr. Rodriguez 1 year ago and had mentioned that she has had shortness of breath. Went to pulmonologist, who though asthma, but methacholine challenge was negative. Did a CT scan that was normal. Then saw cardiology, who had her do Ziopatch that showed a couple strings of SVTs.     Ana Luisa all mentions she may be due to recheck her thyroid US.     PMH, MEDICATIONS, ALLERGIES, SOCIAL AND FAMILY HISTORY in [facility] and reviewed by me personally.      ROS negative other than the symptoms noted above in the HPI.        Examination   /88 (BP Location: Left arm, Patient Position: Sitting, Cuff Size: Adult Large)   Pulse 84   Temp 98  F (36.7  C) (Oral)   Wt 104.9 kg (231 lb 3.2 oz)   LMP 07/29/2013   SpO2 99%   BMI 38.47 kg/m         Constitutional: Sitting comfortably, in no acute  distress. Vital signs noted  Neck:  Mild right anterior cervical adenopathy (tender), trachea midline and normal to palpation, thyroid normal to palpation  Cardiovascular:  regular rate and rhythm, no murmurs, clicks, or gallops  Respiratory:  normal respiratory rate and rhythm, lungs clear to auscultation  NEURO:  cranial nerves 2-12 intact, muscle strength normal, reflexes normal and symmetric  SKIN: No jaundice/pallor/rash.   Psychiatric: mentation appears normal and affect normal/bright        A/P    ICD-10-CM    1. Persistent headaches R51 Basic Metabolic Panel (BFP)     HEMOGRAM/PLATELET (P)     ESR, WESTERGREN (P)     C-Reactive Protein CRP ([facility])     VENOUS COLLECTION     predniSONE (DELTASONE) 20 MG tablet   2. Shortness of breath R06.02 US Head Neck Soft Tissue       DISCUSSION:  Reassured by acute on chronic nature of headache, changing severity (currently better than earlier today), and normal neurological exam. Was contemplating steroid for migraine headache, but agree that this could be GCA. Based on this, will have pt return tomorrow morning to check CBC, CRP, ESR, BMP. After lab, if symptoms still significant, recommended she start 5 day burst of prednisone 20 mg BID. Take with food. Warned of side effects. I will contact her Monday with lab results, and any further recommendations. Urged that she proceed to ER with significantly worsening symptoms. This would provide immediate lab results.     Will also order US to ensure no lymph nodes abnormalities, no thyroid abnormalities- especially that could be contirubging to chronic, currently idiopathic, SOB.     follow up visit: 1 week    Shantelle Brian PA-C  [facility]

## 2019-06-21 NOTE — NURSING NOTE
Ana Luisa is here today for headaches.    Pre-visit Screening:  Immunizations:  up to date  Colonoscopy:  is up to date  Mammogram: is up to date  Asthma Action Test/Plan:  JIMENA  PHQ9:  NA  GAD7:  NA  Questioned patient about current smoking habits Pt. has never smoked.  Ok to leave detailed message on voice mail for today's visit only Yes, phone # [phone]

## 2019-06-22 DIAGNOSIS — E66.01 MORBID OBESITY (H): Primary | ICD-10-CM

## 2019-06-22 DIAGNOSIS — R51.9 PERSISTENT HEADACHES: ICD-10-CM

## 2019-06-22 LAB
ERYTHROCYTE [DISTWIDTH] IN BLOOD BY AUTOMATED COUNT: 12.3 %
ERYTHROCYTE [SEDIMENTATION RATE] IN BLOOD: 20 MM/HR (ref 0–20)
HCT VFR BLD AUTO: 41.3 % (ref 35–47)
HEMOGLOBIN: 13.3 G/DL (ref 11.7–15.7)
MCH RBC QN AUTO: 29.3 PG (ref 26–33)
MCHC RBC AUTO-ENTMCNC: 32.2 G/DL (ref 31–36)
MCV RBC AUTO: 90.9 FL (ref 78–100)
PLATELET COUNT - QUEST: 220 10^9/L (ref 150–375)
RBC # BLD AUTO: 4.54 10*12/L (ref 3.8–5.2)
WBC # BLD AUTO: 6 10*9/L (ref 4–11)

## 2019-06-22 PROCEDURE — 36415 COLL VENOUS BLD VENIPUNCTURE: CPT | Performed by: PHYSICIAN ASSISTANT

## 2019-06-22 PROCEDURE — 86140 C-REACTIVE PROTEIN: CPT | Mod: 90 | Performed by: PHYSICIAN ASSISTANT

## 2019-06-22 PROCEDURE — 80053 COMPREHEN METABOLIC PANEL: CPT | Performed by: PHYSICIAN ASSISTANT

## 2019-06-22 PROCEDURE — 85651 RBC SED RATE NONAUTOMATED: CPT | Performed by: PHYSICIAN ASSISTANT

## 2019-06-22 PROCEDURE — 85027 COMPLETE CBC AUTOMATED: CPT | Performed by: PHYSICIAN ASSISTANT

## 2019-06-24 ENCOUNTER — TELEPHONE (OUTPATIENT)
Dept: FAMILY MEDICINE | Facility: CLINIC | Age: 59
End: 2019-06-24

## 2019-06-24 DIAGNOSIS — R51.9 PERSISTENT HEADACHES: Primary | ICD-10-CM

## 2019-06-24 LAB
ALBUMIN SERPL-MCNC: 4.1 G/DL (ref 3.6–5.1)
ALBUMIN/GLOB SERPL: 1.4 (CALC) (ref 1–2.5)
ALP SERPL-CCNC: 97 U/L (ref 33–130)
ALT SERPL-CCNC: 25 U/L (ref 6–29)
AST SERPL-CCNC: 22 U/L (ref 10–35)
BILIRUB SERPL-MCNC: 0.4 MG/DL (ref 0.2–1.2)
BUN SERPL-MCNC: 14 MG/DL (ref 7–25)
BUN/CREATININE RATIO: NORMAL (CALC) (ref 6–22)
CALCIUM SERPL-MCNC: 9.3 MG/DL (ref 8.6–10.4)
CHLORIDE SERPLBLD-SCNC: 104 MMOL/L (ref 98–110)
CO2 SERPL-SCNC: 26 MMOL/L (ref 20–32)
CREAT SERPL-MCNC: 0.68 MG/DL (ref 0.5–1.05)
CRP SERPL-MCNC: 2.3 MG/L (ref 0–0.8)
EGFR AFRICAN AMERICAN - QUEST: 113 ML/MIN/1.73M2
GFR SERPL CREATININE-BSD FRML MDRD: 97 ML/MIN/1.73M2
GLOBULIN, CALCULATED - QUEST: 2.9 G/DL (CALC) (ref 1.9–3.7)
GLUCOSE - QUEST: 89 MG/DL (ref 65–99)
POTASSIUM SERPL-SCNC: 4 MMOL/L (ref 3.5–5.3)
PROT SERPL-MCNC: 7 G/DL (ref 6.1–8.1)
SODIUM SERPL-SCNC: 140 MMOL/L (ref 135–146)

## 2019-06-24 NOTE — TELEPHONE ENCOUNTER
Called and spoke to Ana Luisa.  Informed of lab results. Headache is ongoing. She has started the prednisone burst Saturday late morning. Asked her to contact ENT for results of CT scan of sinuses and brain. She sees neurologist Dr. Patrice Aguirre, but could not get in until Sept. I will contact them to see if sooner is available. Will call pt again Tuesday.      Cardiac MRI scheduled  7/11/2019.   Today- Thyroid US.     Called and left message 6/25/2019 for Dr. Aguirre'ss care coordinator seeing how quickly Ana Luisa could be scheduled with him or alternate provider.

## 2019-06-25 ENCOUNTER — TRANSFERRED RECORDS (OUTPATIENT)
Dept: FAMILY MEDICINE | Facility: CLINIC | Age: 59
End: 2019-06-25

## 2019-06-25 DIAGNOSIS — R13.12 OROPHARYNGEAL DYSPHAGIA: ICD-10-CM

## 2019-06-26 NOTE — TELEPHONE ENCOUNTER
Spoke to triage at [facility]. Ana Luisa has been rescheduled for this Friday June 28. Called and left message for Ana Luisa saying that this is great that they will be able to see her so quickly. Warned her I am out of office Thurs/Fri

## 2019-07-01 ENCOUNTER — MYC MEDICAL ADVICE (OUTPATIENT)
Dept: FAMILY MEDICINE | Facility: CLINIC | Age: 59
End: 2019-07-01

## 2019-07-01 ENCOUNTER — TRANSFERRED RECORDS (OUTPATIENT)
Dept: FAMILY MEDICINE | Facility: CLINIC | Age: 59
End: 2019-07-01

## 2019-07-06 ENCOUNTER — TRANSFERRED RECORDS (OUTPATIENT)
Dept: FAMILY MEDICINE | Facility: CLINIC | Age: 59
End: 2019-07-06

## 2019-07-11 ENCOUNTER — HOSPITAL ENCOUNTER (OUTPATIENT)
Dept: CARDIOLOGY | Facility: CLINIC | Age: 59
Discharge: HOME | End: 2019-07-11
Attending: INTERNAL MEDICINE | Admitting: INTERNAL MEDICINE
Payer: COMMERCIAL

## 2019-07-11 VITALS — DIASTOLIC BLOOD PRESSURE: 67 MMHG | SYSTOLIC BLOOD PRESSURE: 113 MMHG | OXYGEN SATURATION: 100 % | HEART RATE: 90 BPM

## 2019-07-11 PROCEDURE — 93018 CV STRESS TEST I&R ONLY: CPT

## 2019-07-11 PROCEDURE — A9585 GADOBUTROL INJECTION: HCPCS | Performed by: INTERNAL MEDICINE

## 2019-07-11 PROCEDURE — 93016 CV STRESS TEST SUPVJ ONLY: CPT

## 2019-07-11 PROCEDURE — 93017 CV STRESS TEST TRACING ONLY: CPT

## 2019-07-11 PROCEDURE — 75563 CARD MRI W/STRESS IMG & DYE: CPT | Mod: 26 | Performed by: INTERNAL MEDICINE

## 2019-07-11 PROCEDURE — 25000128 H RX IP 250 OP 636: Performed by: INTERNAL MEDICINE

## 2019-07-11 PROCEDURE — 25500064 ZZH RX 255 OP 636: Performed by: INTERNAL MEDICINE

## 2019-07-11 RX ORDER — AMINOPHYLLINE 25 MG/ML
100 INJECTION, SOLUTION INTRAVENOUS ONCE
Status: DISCONTINUED | OUTPATIENT
Start: 2019-07-11 | End: 2019-07-12 | Stop reason: HOSPADM

## 2019-07-11 RX ORDER — CAFFEINE CITRATE 20 MG/ML
60 SOLUTION INTRAVENOUS
Status: DISCONTINUED | OUTPATIENT
Start: 2019-07-11 | End: 2019-07-12 | Stop reason: HOSPADM

## 2019-07-11 RX ORDER — CAFFEINE 200 MG
200 TABLET ORAL
Status: DISCONTINUED | OUTPATIENT
Start: 2019-07-11 | End: 2019-07-12 | Stop reason: HOSPADM

## 2019-07-11 RX ORDER — DIAZEPAM 5 MG
5 TABLET ORAL EVERY 30 MIN PRN
Status: DISCONTINUED | OUTPATIENT
Start: 2019-07-11 | End: 2019-07-12 | Stop reason: HOSPADM

## 2019-07-11 RX ORDER — METHYLPREDNISOLONE SODIUM SUCCINATE 125 MG/2ML
125 INJECTION, POWDER, LYOPHILIZED, FOR SOLUTION INTRAMUSCULAR; INTRAVENOUS
Status: DISCONTINUED | OUTPATIENT
Start: 2019-07-11 | End: 2019-07-12 | Stop reason: HOSPADM

## 2019-07-11 RX ORDER — DIPHENHYDRAMINE HYDROCHLORIDE 50 MG/ML
25-50 INJECTION INTRAMUSCULAR; INTRAVENOUS
Status: DISCONTINUED | OUTPATIENT
Start: 2019-07-11 | End: 2019-07-12 | Stop reason: HOSPADM

## 2019-07-11 RX ORDER — REGADENOSON 0.08 MG/ML
0.4 INJECTION, SOLUTION INTRAVENOUS ONCE
Status: COMPLETED | OUTPATIENT
Start: 2019-07-11 | End: 2019-07-11

## 2019-07-11 RX ORDER — ALBUTEROL SULFATE 90 UG/1
2 AEROSOL, METERED RESPIRATORY (INHALATION) EVERY 5 MIN PRN
Status: DISCONTINUED | OUTPATIENT
Start: 2019-07-11 | End: 2019-07-12 | Stop reason: HOSPADM

## 2019-07-11 RX ORDER — ACYCLOVIR 200 MG/1
0-1 CAPSULE ORAL
Status: DISCONTINUED | OUTPATIENT
Start: 2019-07-11 | End: 2019-07-12 | Stop reason: HOSPADM

## 2019-07-11 RX ORDER — DIPHENHYDRAMINE HCL 25 MG
25 CAPSULE ORAL
Status: DISCONTINUED | OUTPATIENT
Start: 2019-07-11 | End: 2019-07-12 | Stop reason: HOSPADM

## 2019-07-11 RX ORDER — GADOBUTROL 604.72 MG/ML
5-65 INJECTION INTRAVENOUS ONCE
Status: COMPLETED | OUTPATIENT
Start: 2019-07-11 | End: 2019-07-11

## 2019-07-11 RX ORDER — ONDANSETRON 2 MG/ML
4 INJECTION INTRAMUSCULAR; INTRAVENOUS
Status: DISCONTINUED | OUTPATIENT
Start: 2019-07-11 | End: 2019-07-12 | Stop reason: HOSPADM

## 2019-07-11 RX ADMIN — GADOBUTROL 28 ML: 604.72 INJECTION INTRAVENOUS at 12:29

## 2019-07-11 RX ADMIN — REGADENOSON 0.4 MG: 0.08 INJECTION, SOLUTION INTRAVENOUS at 11:46

## 2019-07-18 ENCOUNTER — OFFICE VISIT (OUTPATIENT)
Dept: CARDIOLOGY | Facility: CLINIC | Age: 59
End: 2019-07-18
Payer: COMMERCIAL

## 2019-07-18 VITALS
HEIGHT: 65 IN | SYSTOLIC BLOOD PRESSURE: 120 MMHG | HEART RATE: 78 BPM | DIASTOLIC BLOOD PRESSURE: 80 MMHG | BODY MASS INDEX: 38.49 KG/M2 | WEIGHT: 231 LBS

## 2019-07-18 DIAGNOSIS — R00.2 PALPITATIONS: Primary | ICD-10-CM

## 2019-07-18 PROCEDURE — 99214 OFFICE O/P EST MOD 30 MIN: CPT | Performed by: NURSE PRACTITIONER

## 2019-07-18 ASSESSMENT — MIFFLIN-ST. JEOR: SCORE: 1633.69

## 2019-07-18 NOTE — LETTER
7/18/2019    Shantelle Brian PA-C  [address], [address]  [facility] [address]    RE: Ana Luisa Raegonzales       Dear Colleague,    I had the pleasure of seeing Ana Luisa Rao in the [facility].    HPI and Plan:   I had the pleasure of seeing Ana Luisa Rao today in cardiology clinic follow up. She is a pleasant 59 year old patient of Dr. Coffey.     Ms. Rao has past medical history significant for palpitations, lower extremity edema, shortness of breath, joint pain, and abdominal pain.  Her mother has had panel vessel disease and her brother has hemochromatosis and she is carrier.    The patient has been having intermittent shortness of breath over the last few years.  It comes and goes.  It occurs sometimes when she is sitting or lying down and sometimes with activity.  She also describes a flutter-like sensation and palpitations that occur intermittently.  Recently, she was traveling and presented to the [facility] with shortness of breath and productive cough.  She also had abdominal pain.  Lab work was reasonable however she had a slightly elevated NT proBNP of 238.  The abdominal ultrasound was negative for cholecystitis.  EKG did not reveal ischemic changes.     She was seen in 2003 at our cardiology clinic with similar symptoms however no objective abnormalities were found.      She had an echocardiogram in 2018 which demonstrated a normal left ventricular systolic function with no valvular disease.  She has reportedly been told as having mitral valve prolapse however this was not confirmed on echocardiogram.    She was recently seen by Dr. Coffey on May 22, 2019 for intermittent shortness of breath, lower extremity edema and dyslipidemia.  A ZIO patch monitor was recommended to assess for arrhythmias and a stress cardiac MRI was ordered to rule out ischemia.  A CT calcium score was also ordered for cardiovascular screening.    The CT calcium score was 0.  The ZIO Patch  monitor showed 2 short runs of supraventricular tachycardia, with the longest interval lasting 5 beats at a maximum heart rate of 125 bpm.  The cardiac MRI demonstrated a normal left ventricular systolic function with an ejection fraction of 64%.  There were no regional wall motion abnormalities noted.  The right ventricle was normal.  Late gadolinium enhancement images showing no MI, fibrosis or infiltrative disease.    Today she presents to the cardiology clinic to review the above testing.  She states she has been doing well since she was last seen by Dr. Coffey.  She saw a pulmonologist who stated the shortness of breath was not from her lungs.  She believes it is likely related to allergies therefore she has started an over-the-counter Claritin and noticed improvement in her shortness of breath.  She does describe occasional palpitations however they are not frequent or life lifestyle limiting.  She has began eating a healthier diet consisting of fruits, vegetables and proteins.  She has also incorporated walking into her daily routine.    Physical Exam  Please see Below     Assessment and Plan  1.  Intermittent shortness of breath.  Her symptoms have improved since incorporating an over-the-counter allergy medication.  The cardiac MRI was negative for ischemia and she has a 0 total calcium score.  There were no significant abnormalities found, therefore her symptoms are unlikely related to her heart. She can return to her primary care provider.  She does not have to follow with cardiology on a regular basis.     2.  Supraventricular tachycardia, up to 5 beats at 125 bpm.  She remains asymptomatic however she is aware of these palpitations.  We discussed adding a low-dose beta-blocker to help suppress the SVT however, she would like to continue to monitor them at this time.  If they become more frequent she will call to initiate beta-blocker therapy.  3.  Dyslipidemia with the HDL of 39 and LDL of 102.  Given her  total calcium score is 0, I am reluctant to start statin therapy.  we discussed at length today eating a healthy diet and exercising will help increase her HDL and lower her LDL.    4.  Lower extremity edema, likely venous insufficiency.  We discussed a bilateral lower extremity venous comp however at this time she is not interested.  She  has begun wearing compression stockings and will continue to elevate when sitting.    Thank you for allowing me to care for Ana Luisa Rao today.    HOLLIE Bonilla, CNP  Cardiology    Voice recognition software was used for this note, I have reviewed this note, but errors may have been missed.    No orders of the defined types were placed in this encounter.    No orders of the defined types were placed in this encounter.    Medications Discontinued During This Encounter   Medication Reason     predniSONE (DELTASONE) 20 MG tablet          CURRENT MEDICATIONS:  Current Outpatient Medications   Medication Sig Dispense Refill     augmented betamethasone dipropionate (DIPROLENE-AF) 0.05 % external cream        BENADRYL DECONGESTANT 25-60 MG OR TABS 1 TABLET EVERY 6 HOURS AS NEEDED       clindamycin (CLEOCIN T) 1 % external solution        clobetasol (TEMOVATE) 0.05 % cream Apply sparingly to affected area twice daily as needed.  Do not apply to face. 30 g 0     doxycycline monohydrate (MONODOX) 100 MG capsule        fluticasone (FLONASE) 50 MCG/ACT nasal spray USE 1-2 SPRAYS INTO BOTH NOSTRILS ONCE DAILY 16 g 11     gabapentin (NEURONTIN) 300 MG capsule TAKE ONE CAPUSULE BY MOUTH EVERY MORNING AND TWO CAPSULES DAILY BEFORE BED. (Patient taking differently: Takes 4 tablets at bedtime) 270 capsule 3     hydrocortisone 2.5 % lotion        IBUPROFEN 200 MG OR TABS 1 TABLET EVERY 4 TO 6 HOURS AS NEEDED       MULTIVITAMIN OR None Entered       omeprazole (PRILOSEC) 20 MG CR capsule Take 1 capsule (20 mg) by mouth 2 times daily 180 capsule 3     polyethylene glycol 0.4%- propylene glycol  "0.3% (SYSTANE ULTRA) 0.4-0.3 % SOLN ophthalmic solution Apply 1 drop to eye as needed       Topiramate (TOPAMAX PO)        triamcinolone (KENALOG) 0.1 % external cream Apply 1 Application topically 2 times daily as needed       TUMS 500 MG OR CHEW 1 TABLET 3 TIMES DAILY PRN       zolpidem (AMBIEN) 5 MG tablet TAKE ONE TABLET BY MOUTH EVERY NIGHT AT BEDTIME AS NEEDED FOR SLEEP 30 tablet 1       ALLERGIES     Allergies   Allergen Reactions     Penicillins        PAST MEDICAL HISTORY:  Past Medical History:   Diagnosis Date     Benign essential tremor      Cervicalgia      Chronic rhinitis      Eczema      Esophageal Reflux/ Schatzke's ring/ PPI long term 7/17/2008     Fibromyalgia syndrome      Former smoker      GERD with esophagitis      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     Knee pain      Leg edema      Obesity      Oropharyngeal dysphagia      Palpitations      Peripheral venous insufficiency      Psoriasis      Pulmonary nodules 09/2018    per chest CT-In Care Everywhere     Reflux esophagitis      Restless legs syndrome      Sjogren's disease (H)      SOB (shortness of breath)      SVT (supraventricular tachycardia) (H)        PAST SURGICAL HISTORY:  Past Surgical History:   Procedure Laterality Date     ENDOSCOPY  2006, 2008    dysphagia     UPPER GI ENDOSCOPY  2/2013    GERD/ Schatzke's ring     UPPER GI ENDOSCOPY  04/13/2017    Schatzkies ring       FAMILY HISTORY:  Family History   Problem Relation Age of Onset     Connective Tissue Disorder Mother         \"vasculitis\"     Hypertension Father      Cancer - colorectal Maternal Uncle         73     Blood Disease Brother         hemocrhomatosis     Breast Cancer No family hx of      Diabetes No family hx of        SOCIAL HISTORY:  Social History     Socioeconomic History     Marital status:      Spouse name: None     Number of children: None     Years of education: None     Highest education level: None   Occupational " History     Occupation:      Employer: [employer]   Social Needs     Financial resource strain: None     Food insecurity:     Worry: None     Inability: None     Transportation needs:     Medical: None     Non-medical: None   Tobacco Use     Smoking status: Former Smoker     Packs/day: 0.50     Years: 10.00     Pack years: 5.00     Types: Cigarettes     Start date:      Last attempt to quit: 2006     Years since quittin.5     Smokeless tobacco: Never Used     Tobacco comment: quit 2006   Substance and Sexual Activity     Alcohol use: Yes     Alcohol/week: 0.5 oz     Types: 1 Standard drinks or equivalent per week     Comment: once a month      Drug use: No     Sexual activity: Yes     Partners: Male     Comment: vas   Lifestyle     Physical activity:     Days per week: None     Minutes per session: None     Stress: None   Relationships     Social connections:     Talks on phone: None     Gets together: None     Attends Synagogue service: None     Active member of club or organization: None     Attends meetings of clubs or organizations: None     Relationship status: None     Intimate partner violence:     Fear of current or ex partner: None     Emotionally abused: None     Physically abused: None     Forced sexual activity: None   Other Topics Concern      Service Not Asked     Blood Transfusions Not Asked     Caffeine Concern Not Asked     Occupational Exposure Not Asked     Hobby Hazards Not Asked     Sleep Concern Not Asked     Stress Concern Not Asked     Weight Concern Not Asked     Special Diet Not Asked     Back Care Not Asked     Exercise No     Comment: intermittently     Bike Helmet Not Asked     Seat Belt Yes     Self-Exams Not Asked     Parent/sibling w/ CABG, MI or angioplasty before 65F 55M? Not Asked   Social History Narrative     None       Review of Systems:  Skin:  Positive for rash     Eyes:  Positive for visual blurring;glasses    ENT:  Positive for   rt ear pressure  "and pain  Respiratory:  Positive for shortness of breath;cough;wheezing     Cardiovascular:    palpitations;chest pain;edema;fatigue;lightheadedness;Positive for    Gastroenterology: Positive for nausea;heartburn;constipation    Genitourinary:  Positive for urinary frequency    Musculoskeletal:  Positive for back pain;neck pain;joint pain    Neurologic:  Positive for numbness or tingling of hands;numbness or tingling of feet;headaches neuropathy  Psychiatric:  Positive for sleep disturbances    Heme/Lymph/Imm:  Positive for allergies    Endocrine:  Negative for        Physical Exam:  Vitals: /80   Pulse 78   Ht 1.651 m (5' 5\")   Wt 104.8 kg (231 lb)   LMP 07/29/2013   BMI 38.44 kg/m       Constitutional:  cooperative;in no acute distress overweight      Skin:  warm and dry to the touch          Head:  normocephalic        Eyes:  pupils equal and round;sclera white        Lymph:      ENT:  no pallor or cyanosis, dentition good        Neck:  carotid pulses are full and equal bilaterally, JVP normal, no carotid bruit        Respiratory:  normal breath sounds, clear to auscultation, normal A-P diameter, normal symmetry, normal respiratory excursion, no use of accessory muscles         Cardiac: regular rhythm;normal S1 and S2   distant heart sounds no presence of murmur          not assessed this visit                                        GI:  abdomen soft;non-tender obese      Extremities and Muscular Skeletal:        trace;telangiectasia trace;telangiectasia      Neurological:  no gross motor deficits        Psych:  Alert and Oriented x 3    No diagnosis found.    Recent Lab Results:  LIPID RESULTS:  Lab Results   Component Value Date    CHOL 171 06/29/2018    HDL 39 (L) 06/29/2018     (H) 06/29/2018    TRIG 149 06/29/2018    CHOLHDLRATIO 4.6 03/21/2016       LIVER ENZYME RESULTS:  Lab Results   Component Value Date    AST 22 06/22/2019    ALT 25 06/22/2019       CBC RESULTS:  Lab Results "   Component Value Date    WBC 6.0 06/22/2019    RBC 4.54 06/22/2019    HGB 13.3 06/22/2019    HCT 41.3 06/22/2019    MCV 90.9 06/22/2019    MCH 29.3 06/22/2019    MCHC 32.2 06/22/2019    RDW 12.3 06/22/2019     06/22/2019     02/13/2019       BMP RESULTS:  Lab Results   Component Value Date     06/22/2019    POTASSIUM 4.0 06/22/2019    CHLORIDE 104 06/22/2019    CO2 26 06/22/2019    ANIONGAP 4 02/13/2019    GLC 89 06/22/2019     (H) 02/13/2019    BUN 14 06/22/2019    BUN NOT APPLICABLE 06/22/2019    CR 0.68 06/22/2019    GFRESTIMATED 97 06/22/2019    GFRESTBLACK >90 02/13/2019    SUSSY 9.3 06/22/2019        A1C RESULTS:  Lab Results   Component Value Date    A1C 4.7 09/29/2011       INR RESULTS:  No results found for: INR        CC  Rajinder Coffey MD  [address]  [address]  [place] [id number]                  Thank you for allowing me to participate in the care of your patient.    Sincerely,     HOLLIE Bonilla CNP     [facility]

## 2019-07-18 NOTE — PROGRESS NOTES
HPI and Plan:   I had the pleasure of seeing Ana Luisa Rao today in cardiology clinic follow up. She is a pleasant 59 year old patient of Dr. Coffey.     Ms. Rao has past medical history significant for palpitations, lower extremity edema, shortness of breath, joint pain, and abdominal pain.  Her mother has had panel vessel disease and her brother has hemochromatosis and she is carrier.    The patient has been having intermittent shortness of breath over the last few years.  It comes and goes.  It occurs sometimes when she is sitting or lying down and sometimes with activity.  She also describes a flutter-like sensation and palpitations that occur intermittently.  Recently, she was traveling and presented to the [facility] with shortness of breath and productive cough.  She also had abdominal pain.  Lab work was reasonable however she had a slightly elevated NT proBNP of 238.  The abdominal ultrasound was negative for cholecystitis.  EKG did not reveal ischemic changes.     She was seen in 2003 at our cardiology clinic with similar symptoms however no objective abnormalities were found.      She had an echocardiogram in 2018 which demonstrated a normal left ventricular systolic function with no valvular disease.  She has reportedly been told as having mitral valve prolapse however this was not confirmed on echocardiogram.    She was recently seen by Dr. Coffey on May 22, 2019 for intermittent shortness of breath, lower extremity edema and dyslipidemia.  A ZIO patch monitor was recommended to assess for arrhythmias and a stress cardiac MRI was ordered to rule out ischemia.  A CT calcium score was also ordered for cardiovascular screening.    The CT calcium score was 0.  The ZIO Patch monitor showed 2 short runs of supraventricular tachycardia, with the longest interval lasting 5 beats at a maximum heart rate of 125 bpm.  The cardiac MRI demonstrated a normal left ventricular systolic function with an ejection  fraction of 64%.  There were no regional wall motion abnormalities noted.  The right ventricle was normal.  Late gadolinium enhancement images showing no MI, fibrosis or infiltrative disease.    Today she presents to the cardiology clinic to review the above testing.  She states she has been doing well since she was last seen by Dr. Coffey.  She saw a pulmonologist who stated the shortness of breath was not from her lungs.  She believes it is likely related to allergies therefore she has started an over-the-counter Claritin and noticed improvement in her shortness of breath.  She does describe occasional palpitations however they are not frequent or life lifestyle limiting.  She has began eating a healthier diet consisting of fruits, vegetables and proteins.  She has also incorporated walking into her daily routine.    Physical Exam  Please see Below     Assessment and Plan  1.  Intermittent shortness of breath.  Her symptoms have improved since incorporating an over-the-counter allergy medication.  The cardiac MRI was negative for ischemia and she has a 0 total calcium score.  There were no significant abnormalities found, therefore her symptoms are unlikely related to her heart. She can return to her primary care provider.  She does not have to follow with cardiology on a regular basis.     2.  Supraventricular tachycardia, up to 5 beats at 125 bpm.  She remains asymptomatic however she is aware of these palpitations.  We discussed adding a low-dose beta-blocker to help suppress the SVT however, she would like to continue to monitor them at this time.  If they become more frequent she will call to initiate beta-blocker therapy.  3.  Dyslipidemia with the HDL of 39 and LDL of 102.  Given her total calcium score is 0, I am reluctant to start statin therapy.  we discussed at length today eating a healthy diet and exercising will help increase her HDL and lower her LDL.    4.  Lower extremity edema, likely venous  insufficiency.  We discussed a bilateral lower extremity venous comp however at this time she is not interested.  She  has begun wearing compression stockings and will continue to elevate when sitting.    Thank you for allowing me to care for Ana Luisa Rao today.    HOLLIE Bonilla, CNP  Cardiology    Voice recognition software was used for this note, I have reviewed this note, but errors may have been missed.    No orders of the defined types were placed in this encounter.    No orders of the defined types were placed in this encounter.    Medications Discontinued During This Encounter   Medication Reason     predniSONE (DELTASONE) 20 MG tablet          CURRENT MEDICATIONS:  Current Outpatient Medications   Medication Sig Dispense Refill     augmented betamethasone dipropionate (DIPROLENE-AF) 0.05 % external cream        BENADRYL DECONGESTANT 25-60 MG OR TABS 1 TABLET EVERY 6 HOURS AS NEEDED       clindamycin (CLEOCIN T) 1 % external solution        clobetasol (TEMOVATE) 0.05 % cream Apply sparingly to affected area twice daily as needed.  Do not apply to face. 30 g 0     doxycycline monohydrate (MONODOX) 100 MG capsule        fluticasone (FLONASE) 50 MCG/ACT nasal spray USE 1-2 SPRAYS INTO BOTH NOSTRILS ONCE DAILY 16 g 11     gabapentin (NEURONTIN) 300 MG capsule TAKE ONE CAPUSULE BY MOUTH EVERY MORNING AND TWO CAPSULES DAILY BEFORE BED. (Patient taking differently: Takes 4 tablets at bedtime) 270 capsule 3     hydrocortisone 2.5 % lotion        IBUPROFEN 200 MG OR TABS 1 TABLET EVERY 4 TO 6 HOURS AS NEEDED       MULTIVITAMIN OR None Entered       omeprazole (PRILOSEC) 20 MG CR capsule Take 1 capsule (20 mg) by mouth 2 times daily 180 capsule 3     polyethylene glycol 0.4%- propylene glycol 0.3% (SYSTANE ULTRA) 0.4-0.3 % SOLN ophthalmic solution Apply 1 drop to eye as needed       Topiramate (TOPAMAX PO)        triamcinolone (KENALOG) 0.1 % external cream Apply 1 Application topically 2 times daily as needed    "    TUMS 500 MG OR CHEW 1 TABLET 3 TIMES DAILY PRN       zolpidem (AMBIEN) 5 MG tablet TAKE ONE TABLET BY MOUTH EVERY NIGHT AT BEDTIME AS NEEDED FOR SLEEP 30 tablet 1       ALLERGIES     Allergies   Allergen Reactions     Penicillins        PAST MEDICAL HISTORY:  Past Medical History:   Diagnosis Date     Benign essential tremor      Cervicalgia      Chronic rhinitis      Eczema      Esophageal Reflux/ Schatzke's ring/ PPI long term 7/17/2008     Fibromyalgia syndrome      Former smoker      GERD with esophagitis      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     Knee pain      Leg edema      Obesity      Oropharyngeal dysphagia      Palpitations      Peripheral venous insufficiency      Psoriasis      Pulmonary nodules 09/2018    per chest CT-In Care Everywhere     Reflux esophagitis      Restless legs syndrome      Sjogren's disease (H)      SOB (shortness of breath)      SVT (supraventricular tachycardia) (H)        PAST SURGICAL HISTORY:  Past Surgical History:   Procedure Laterality Date     ENDOSCOPY  2006, 2008    dysphagia     UPPER GI ENDOSCOPY  2/2013    GERD/ Schatzke's ring     UPPER GI ENDOSCOPY  04/13/2017    Schatzkies ring       FAMILY HISTORY:  Family History   Problem Relation Age of Onset     Connective Tissue Disorder Mother         \"vasculitis\"     Hypertension Father      Cancer - colorectal Maternal Uncle         73     Blood Disease Brother         hemocrhomatosis     Breast Cancer No family hx of      Diabetes No family hx of        SOCIAL HISTORY:  Social History     Socioeconomic History     Marital status:      Spouse name: None     Number of children: None     Years of education: None     Highest education level: None   Occupational History     Occupation:      Employer: [employer]   Social Needs     Financial resource strain: None     Food insecurity:     Worry: None     Inability: None     Transportation needs:     Medical: None     Non-medical: None "   Tobacco Use     Smoking status: Former Smoker     Packs/day: 0.50     Years: 10.00     Pack years: 5.00     Types: Cigarettes     Start date:      Last attempt to quit: 2006     Years since quittin.5     Smokeless tobacco: Never Used     Tobacco comment: quit 2006   Substance and Sexual Activity     Alcohol use: Yes     Alcohol/week: 0.5 oz     Types: 1 Standard drinks or equivalent per week     Comment: once a month      Drug use: No     Sexual activity: Yes     Partners: Male     Comment: vas   Lifestyle     Physical activity:     Days per week: None     Minutes per session: None     Stress: None   Relationships     Social connections:     Talks on phone: None     Gets together: None     Attends Zoroastrianism service: None     Active member of club or organization: None     Attends meetings of clubs or organizations: None     Relationship status: None     Intimate partner violence:     Fear of current or ex partner: None     Emotionally abused: None     Physically abused: None     Forced sexual activity: None   Other Topics Concern      Service Not Asked     Blood Transfusions Not Asked     Caffeine Concern Not Asked     Occupational Exposure Not Asked     Hobby Hazards Not Asked     Sleep Concern Not Asked     Stress Concern Not Asked     Weight Concern Not Asked     Special Diet Not Asked     Back Care Not Asked     Exercise No     Comment: intermittently     Bike Helmet Not Asked     Seat Belt Yes     Self-Exams Not Asked     Parent/sibling w/ CABG, MI or angioplasty before 65F 55M? Not Asked   Social History Narrative     None       Review of Systems:  Skin:  Positive for rash     Eyes:  Positive for visual blurring;glasses    ENT:  Positive for   rt ear pressure and pain  Respiratory:  Positive for shortness of breath;cough;wheezing     Cardiovascular:    palpitations;chest pain;edema;fatigue;lightheadedness;Positive for    Gastroenterology: Positive for nausea;heartburn;constipation   "  Genitourinary:  Positive for urinary frequency    Musculoskeletal:  Positive for back pain;neck pain;joint pain    Neurologic:  Positive for numbness or tingling of hands;numbness or tingling of feet;headaches neuropathy  Psychiatric:  Positive for sleep disturbances    Heme/Lymph/Imm:  Positive for allergies    Endocrine:  Negative for        Physical Exam:  Vitals: /80   Pulse 78   Ht 1.651 m (5' 5\")   Wt 104.8 kg (231 lb)   LMP 07/29/2013   BMI 38.44 kg/m      Constitutional:  cooperative;in no acute distress overweight      Skin:  warm and dry to the touch          Head:  normocephalic        Eyes:  pupils equal and round;sclera white        Lymph:      ENT:  no pallor or cyanosis, dentition good        Neck:  carotid pulses are full and equal bilaterally, JVP normal, no carotid bruit        Respiratory:  normal breath sounds, clear to auscultation, normal A-P diameter, normal symmetry, normal respiratory excursion, no use of accessory muscles         Cardiac: regular rhythm;normal S1 and S2   distant heart sounds no presence of murmur          not assessed this visit                                        GI:  abdomen soft;non-tender obese      Extremities and Muscular Skeletal:        trace;telangiectasia trace;telangiectasia      Neurological:  no gross motor deficits        Psych:  Alert and Oriented x 3    No diagnosis found.    Recent Lab Results:  LIPID RESULTS:  Lab Results   Component Value Date    CHOL 171 06/29/2018    HDL 39 (L) 06/29/2018     (H) 06/29/2018    TRIG 149 06/29/2018    CHOLHDLRATIO 4.6 03/21/2016       LIVER ENZYME RESULTS:  Lab Results   Component Value Date    AST 22 06/22/2019    ALT 25 06/22/2019       CBC RESULTS:  Lab Results   Component Value Date    WBC 6.0 06/22/2019    RBC 4.54 06/22/2019    HGB 13.3 06/22/2019    HCT 41.3 06/22/2019    MCV 90.9 06/22/2019    MCH 29.3 06/22/2019    MCHC 32.2 06/22/2019    RDW 12.3 06/22/2019     06/22/2019    PLT " 231 02/13/2019       BMP RESULTS:  Lab Results   Component Value Date     06/22/2019    POTASSIUM 4.0 06/22/2019    CHLORIDE 104 06/22/2019    CO2 26 06/22/2019    ANIONGAP 4 02/13/2019    GLC 89 06/22/2019     (H) 02/13/2019    BUN 14 06/22/2019    BUN NOT APPLICABLE 06/22/2019    CR 0.68 06/22/2019    GFRESTIMATED 97 06/22/2019    GFRESTBLACK >90 02/13/2019    SUSSY 9.3 06/22/2019        A1C RESULTS:  Lab Results   Component Value Date    A1C 4.7 09/29/2011       INR RESULTS:  No results found for: INR        CC  Rajinder Coffey MD  [id number]  [id number]  [place] [id number]

## 2019-08-26 ENCOUNTER — TELEPHONE (OUTPATIENT)
Dept: FAMILY MEDICINE | Facility: CLINIC | Age: 59
End: 2019-08-26

## 2019-08-26 NOTE — TELEPHONE ENCOUNTER
"Ana Luisa Rao would like to request a referral.   Reason: Insurance Co needs pharmacy authorization   Requested provider: Pharmacy   Comment:   Hi. This may not be the correct spot to submit this request, but.... I am having difficulty filling my prescription for my allergy nasal spray fluticasone that you (Dr. Rodriguez) refilled for me on 5/17/19.  My [employer]/[place] insurance is saying that you need to call in and \"authorize\" it. Crazy I know...I think it is because it can be purchased over the counter (for 1000% more than it would cost me through the pharmacy with insurance!).  I'm curious if they are also going to say the same thing about my Omeprazole prescription? Perhaps that could be mentioned as well at same time? The [place] authorization phone number to call is  [phone]. Thank you!!     Pt sent message via OrderWithMe, forwarding to Lynn.   "

## 2019-08-26 NOTE — TELEPHONE ENCOUNTER
Called and left patient a voicemail and also sent [employer] message informing her to tell the pharmacy to send us information to start a PA.

## 2019-09-30 ENCOUNTER — HEALTH MAINTENANCE LETTER (OUTPATIENT)
Age: 59
End: 2019-09-30

## 2019-10-16 ENCOUNTER — TRANSFERRED RECORDS (OUTPATIENT)
Dept: FAMILY MEDICINE | Facility: CLINIC | Age: 59
End: 2019-10-16

## 2019-10-16 ENCOUNTER — OFFICE VISIT (OUTPATIENT)
Dept: FAMILY MEDICINE | Facility: CLINIC | Age: 59
End: 2019-10-16

## 2019-10-16 VITALS
DIASTOLIC BLOOD PRESSURE: 70 MMHG | HEART RATE: 91 BPM | TEMPERATURE: 98 F | BODY MASS INDEX: 38.94 KG/M2 | SYSTOLIC BLOOD PRESSURE: 122 MMHG | WEIGHT: 234 LBS | OXYGEN SATURATION: 98 %

## 2019-10-16 DIAGNOSIS — Z23 NEED FOR VACCINATION: ICD-10-CM

## 2019-10-16 DIAGNOSIS — H92.01 OTALGIA, RIGHT: ICD-10-CM

## 2019-10-16 DIAGNOSIS — H02.89 DISORDER OF EYELASHES: ICD-10-CM

## 2019-10-16 PROCEDURE — 90686 IIV4 VACC NO PRSV 0.5 ML IM: CPT | Performed by: FAMILY MEDICINE

## 2019-10-16 PROCEDURE — 90714 TD VACC NO PRESV 7 YRS+ IM: CPT | Performed by: FAMILY MEDICINE

## 2019-10-16 PROCEDURE — 90471 IMMUNIZATION ADMIN: CPT | Performed by: FAMILY MEDICINE

## 2019-10-16 PROCEDURE — 99213 OFFICE O/P EST LOW 20 MIN: CPT | Mod: 25 | Performed by: FAMILY MEDICINE

## 2019-10-16 PROCEDURE — 90472 IMMUNIZATION ADMIN EACH ADD: CPT | Performed by: FAMILY MEDICINE

## 2019-10-16 NOTE — PROGRESS NOTES
SUBJECTIVE:  59 year old female presents with the following concerns:    Extremely itchy eyelashes  Symptoms started with right eye upper lashes  Then all lashes  No tearing- history of sjogren's dry eye  Up to date with her eye exams  Vision is variable (discussed difficulty seeing at her last eye exam-normal exam)  She denies eyelid swelling    Other associated symptoms  Intermittent ight ear pain: scalp is tender to touch    Shantelle has been seeing for right sided temporal headache  Pressure below and behind her right eye   Normal ENT exam and CT of sinuses memorial weekend        Patient Active Problem List   Diagnosis     Neuropathic pain syndrome (non-herpetic)     Vitamin D deficiency     Allergic rhinitis     Esophageal Reflux/ Schatzke's ring/ PPI long term     Fibromyalgia     Health Care Home     Posttraumatic stress disorder     Low back pain     Lumbar degenerative disc disease     ACP (advance care planning)     Foot pain, right     Morbid obesity (H)     Shortness of breath     Venous (peripheral) insufficiency     Palpitations     Past Surgical History:   Procedure Laterality Date     ENDOSCOPY  2006, 2008    dysphagia     UPPER GI ENDOSCOPY  2/2013    GERD/ Schatzke's ring     UPPER GI ENDOSCOPY  04/13/2017    Schatzkies ring     Current Outpatient Medications   Medication     augmented betamethasone dipropionate (DIPROLENE-AF) 0.05 % external cream     clindamycin (CLEOCIN T) 1 % external solution     clobetasol (TEMOVATE) 0.05 % cream     doxycycline monohydrate (MONODOX) 100 MG capsule     fluticasone (FLONASE) 50 MCG/ACT nasal spray     gabapentin (NEURONTIN) 300 MG capsule     hydrocortisone 2.5 % lotion     IBUPROFEN 200 MG OR TABS     MULTIVITAMIN OR     omeprazole (PRILOSEC) 20 MG CR capsule     polyethylene glycol 0.4%- propylene glycol 0.3% (SYSTANE ULTRA) 0.4-0.3 % SOLN ophthalmic solution     TUMS 500 MG OR CHEW     zolpidem (AMBIEN) 5 MG tablet     BENADRYL DECONGESTANT 25-60 MG OR  TABS     No current facility-administered medications for this visit.       ROS: 7 point ROS neg other than the symptoms noted above in the HPI.    OBJECTIVE:  /70 (BP Location: Left arm, Patient Position: Sitting, Cuff Size: Adult Large)   Pulse 91   Temp 98  F (36.7  C) (Oral)   Wt 106.1 kg (234 lb)   LMP 07/29/2013   SpO2 98%   BMI 38.94 kg/m    Pale conjunctiva-  Using her lubricating drops  Normal lashes- examined with magnifier  PERRL  Lids appear normal- no inflammation  External ears  and canals clear bilaterally. TM's normal bilaterally. Nose normal without lesions or discharge. Oropharynx normal. Neck supple without palpable adenopathy.      Assessment   Eyelash itching of uncertain etiology  Right ear pain of uncertain etiology    PLAN:  Eye referral  Facial pain center or [facility] for right ear pain, right facial pain

## 2019-10-16 NOTE — NURSING NOTE
Chief Complaint   Patient presents with     Consult     headache for 3 months, earache intermittenly      Pre-visit Screening:  Immunizations:  not up to date - td and flu  Colonoscopy:  is up to date  Mammogram: is up to date  Asthma Action Test/Plan:  NA  PHQ9:  NA  GAD7:  NA  Questioned patient about current smoking habits Pt. quit smoking some time ago.  Ok to leave detailed message on voice mail for today's visit only yes, phone # [phone]

## 2019-10-17 ENCOUNTER — MYC MEDICAL ADVICE (OUTPATIENT)
Dept: FAMILY MEDICINE | Facility: CLINIC | Age: 59
End: 2019-10-17

## 2019-10-23 ENCOUNTER — TRANSFERRED RECORDS (OUTPATIENT)
Dept: FAMILY MEDICINE | Facility: CLINIC | Age: 59
End: 2019-10-23

## 2019-11-08 DIAGNOSIS — F17.291 NICOTINE DEPENDENCE, OTHER TOBACCO PRODUCT, IN REMISSION: ICD-10-CM

## 2019-11-08 DIAGNOSIS — R05.9 COUGH: ICD-10-CM

## 2019-11-08 DIAGNOSIS — R91.1 SOLITARY PULMONARY NODULE: ICD-10-CM

## 2019-11-08 DIAGNOSIS — J30.9 ALLERGIC RHINITIS: Primary | ICD-10-CM

## 2019-11-08 DIAGNOSIS — J38.5 LARYNGEAL SPASM: ICD-10-CM

## 2019-11-08 LAB
FUNGUS SPEC CULT: ABNORMAL
FUNGUS SPEC CULT: ABNORMAL
GRAM STN SPEC: ABNORMAL
Lab: ABNORMAL
SPECIMEN SOURCE: ABNORMAL
SPECIMEN SOURCE: ABNORMAL

## 2019-11-08 PROCEDURE — 87205 SMEAR GRAM STAIN: CPT | Performed by: INTERNAL MEDICINE

## 2019-11-18 ENCOUNTER — TRANSFERRED RECORDS (OUTPATIENT)
Dept: FAMILY MEDICINE | Facility: CLINIC | Age: 59
End: 2019-11-18

## 2019-11-19 DIAGNOSIS — K22.2 SCHATZKI'S RING: ICD-10-CM

## 2019-11-20 NOTE — TELEPHONE ENCOUNTER
"Requested Prescriptions   Pending Prescriptions Disp Refills     omeprazole (PRILOSEC) 20 MG DR capsule [Pharmacy Med Name: OMEPRAZOLE 20MG CPDR]    Last Written Prescription Date:  10/23/2018  Last Fill Quantity: 180,  # refills: 3   Last office visit: 10/23/2018 with prescribing provider:  Shantelle Brian     Future Office Visit:     90 capsule 2     Sig: TAKE ONE CAPSULE BY MOUTH EVERY DAY       PPI Protocol Failed - 11/19/2019 11:00 PM        Failed - Recent (12 mo) or future (30 days) visit within the authorizing provider's specialty     Patient has had an office visit with the authorizing provider or a provider within the authorizing providers department within the previous 12 mos or has a future within next 30 days. See \"Patient Info\" tab in inbasket, or \"Choose Columns\" in Meds & Orders section of the refill encounter.              Passed - Not on Clopidogrel (unless Pantoprazole ordered)        Passed - No diagnosis of osteoporosis on record        Passed - Medication is active on med list        Passed - Patient is age 18 or older        Passed - No active pregnacy on record        Passed - No positive pregnancy test in past 12 months          "

## 2019-11-25 ENCOUNTER — OFFICE VISIT (OUTPATIENT)
Dept: FAMILY MEDICINE | Facility: CLINIC | Age: 59
End: 2019-11-25

## 2019-11-25 VITALS
OXYGEN SATURATION: 98 % | HEART RATE: 76 BPM | WEIGHT: 233.8 LBS | DIASTOLIC BLOOD PRESSURE: 82 MMHG | HEIGHT: 65 IN | SYSTOLIC BLOOD PRESSURE: 124 MMHG | BODY MASS INDEX: 38.95 KG/M2 | TEMPERATURE: 98.2 F

## 2019-11-25 DIAGNOSIS — Z12.4 SCREENING FOR CERVICAL CANCER: ICD-10-CM

## 2019-11-25 DIAGNOSIS — B96.89 BACTERIAL VAGINOSIS: Primary | ICD-10-CM

## 2019-11-25 DIAGNOSIS — N76.0 BACTERIAL VAGINOSIS: Primary | ICD-10-CM

## 2019-11-25 LAB
ALBUMIN (URINE): ABNORMAL MG/DL
APPEARANCE UR: CLEAR
BACTERIA (WET PREP): ABNORMAL
BACTERIA, UR: ABNORMAL
BILIRUB UR QL: ABNORMAL
CASTS/LPF: ABNORMAL
CLUE CELLS: POSITIVE
COLOR UR: YELLOW
EP/HPF: ABNORMAL
GLUCOSE URINE: ABNORMAL MG/DL
HGB UR QL: ABNORMAL
KETONES UR QL: ABNORMAL MG/DL
LEUKOCYTE ESTERASE - QUEST: ABNORMAL
MISC.: ABNORMAL
MISC.: ABNORMAL
NITRITE UR QL STRIP: ABNORMAL
PH FLUID SOURCE: 4 (ref 3.5–4.5)
PH UR STRIP: 8.5 PH (ref 5–7)
PMNS (WET PREP): ABNORMAL
RBC, UR MICRO: ABNORMAL (ref ?–2)
SP. GRAVITY: 1.01
TRICHOMONAS VAGINALS: ABNORMAL
UROBILINOGEN UR QL STRIP: 0.2 EU/DL (ref 0.2–1)
WBC, UR MICRO: ABNORMAL (ref ?–2)
YEAST CELLS: ABNORMAL

## 2019-11-25 PROCEDURE — 87086 URINE CULTURE/COLONY COUNT: CPT | Mod: 90 | Performed by: PHYSICIAN ASSISTANT

## 2019-11-25 PROCEDURE — 99213 OFFICE O/P EST LOW 20 MIN: CPT | Performed by: PHYSICIAN ASSISTANT

## 2019-11-25 PROCEDURE — 87088 URINE BACTERIA CULTURE: CPT | Mod: 90 | Performed by: PHYSICIAN ASSISTANT

## 2019-11-25 PROCEDURE — 88142 CYTOPATH C/V THIN LAYER: CPT | Mod: 90 | Performed by: PHYSICIAN ASSISTANT

## 2019-11-25 PROCEDURE — 81001 URINALYSIS AUTO W/SCOPE: CPT | Performed by: PHYSICIAN ASSISTANT

## 2019-11-25 PROCEDURE — 87210 SMEAR WET MOUNT SALINE/INK: CPT | Performed by: PHYSICIAN ASSISTANT

## 2019-11-25 RX ORDER — FLUOCINONIDE 0.5 MG/G
CREAM TOPICAL
COMMUNITY
Start: 2019-11-19 | End: 2023-01-17

## 2019-11-25 RX ORDER — METRONIDAZOLE 500 MG/1
500 TABLET ORAL 2 TIMES DAILY
Qty: 14 TABLET | Refills: 0 | Status: SHIPPED | OUTPATIENT
Start: 2019-11-25 | End: 2019-12-31

## 2019-11-25 ASSESSMENT — MIFFLIN-ST. JEOR: SCORE: 1646.39

## 2019-11-25 NOTE — NURSING NOTE
Ana Luisa is here today for a vaginal issue.    Pre-visit Screening:  Immunizations:  up to date  Colonoscopy:  Not up to date  Mammogram: is up to date  Asthma Action Test/Plan:  NA  PHQ9:  NA  GAD7:  NA  Questioned patient about current smoking habits Pt. has never smoked.  Ok to leave detailed message on voice mail for today's visit only Yes, phone # [phone]

## 2019-11-25 NOTE — PROGRESS NOTES
"CC: BV    History:  Ana Luisa is here today with vaginal odor that has been there for months now. Denies any vaginal discharge. No pain, irritation, or itching. Ana Luisa is \"barely\" sexually active in long term monogamous relationship, and no concerns for STDS. No urinary symptoms.     PMH, MEDICATIONS, ALLERGIES, SOCIAL AND FAMILY HISTORY in [facility] and reviewed by me personally.    ROS negative other than the symptoms noted above in the HPI.    Examination   /82 (BP Location: Left arm, Patient Position: Sitting, Cuff Size: Adult Large)   Pulse 76   Temp 98.2  F (36.8  C) (Oral)   Ht 1.651 m (5' 5\")   Wt 106.1 kg (233 lb 12.8 oz)   LMP 07/29/2013   SpO2 98%   BMI 38.91 kg/m       Constitutional: Sitting comfortably, in no acute distress. Vital signs noted  Neck:  no adenopathy, trachea midline and normal to palpation, thyroid normal to palpation  Cardiovascular:  regular rate and rhythm, no murmurs, clicks, or gallops  Respiratory:  normal respiratory rate and rhythm, lungs clear to auscultation  Abdomen: Abdomen soft, non-tender. BS normal. No masses, organomegaly  : External genitalia without abnormality. Vaginal canal with moderate amount of thin white discharge. Cervix intact without abnormality.    SKIN: No jaundice/pallor/rash.   Psychiatric: mentation appears normal and affect normal/bright        A/P    ICD-10-CM    1. Bacterial vaginosis N76.0 metroNIDAZOLE (FLAGYL) 500 MG tablet    B96.89    2. Screening for cervical cancer Z12.4 ThinPrep Pap and HPV (mRNA E6/E7)([facility])       DISCUSSION:  UA today appears clear. Will culture to confirm no urinary infection.     Wet prep shows clue cells, bacteria, consistent with diagnosis of bacterial vaginosis. Explained to pt that this tends to affect people who are sexually active. Advised frequent hand washing and good general hygeine, consider rinsing vaginal area with warm water after sexual activity, wear breathable/cotton underwear, and go without underwear " when possible. Will treat with 7 day course of metronidazole 1 tablet twice daily. Take with food. Warned of side effects.  Advised of absolutely no alcohol consumption while taking this medication. Should refrain from sexual activity for 2 weeks while infection resolves. Contact me in 1 week if not significantly improved, or in 2 weeks if not back to normal.     Ana Luisa also requests update pap smear today as she is quite adverse to pelvic exams.    follow up visit: As needed    Shantelle Brian PA-C  [facility]

## 2019-11-27 LAB — URINE VOIDED CULTURE: NORMAL

## 2019-11-29 LAB
CLINICAL HISTORY - QUEST: NORMAL
COMMENT - QUEST: NORMAL
CYTOTECHNOLOGIST - QUEST: NORMAL
DESCRIPTIVE DIAGNOSIS - QUEST: NORMAL
LAST PAP DX - QUEST: NORMAL
LMP - QUEST: NORMAL
PREV BX DX - QUEST: NORMAL
SOURCE: NORMAL
STATEMENT OF ADEQUACY - QUEST: NORMAL

## 2019-12-02 ENCOUNTER — TRANSFERRED RECORDS (OUTPATIENT)
Dept: FAMILY MEDICINE | Facility: CLINIC | Age: 59
End: 2019-12-02

## 2019-12-09 ENCOUNTER — HEALTH MAINTENANCE LETTER (OUTPATIENT)
Age: 59
End: 2019-12-09

## 2019-12-12 ENCOUNTER — TRANSFERRED RECORDS (OUTPATIENT)
Dept: FAMILY MEDICINE | Facility: CLINIC | Age: 59
End: 2019-12-12

## 2019-12-20 ENCOUNTER — MYC MEDICAL ADVICE (OUTPATIENT)
Dept: FAMILY MEDICINE | Facility: CLINIC | Age: 59
End: 2019-12-20

## 2019-12-20 DIAGNOSIS — B96.89 BACTERIAL VAGINOSIS: Primary | ICD-10-CM

## 2019-12-20 DIAGNOSIS — N76.0 BACTERIAL VAGINOSIS: Primary | ICD-10-CM

## 2019-12-20 RX ORDER — METRONIDAZOLE 500 MG/1
500 TABLET ORAL 2 TIMES DAILY
Qty: 20 TABLET | Refills: 0 | Status: SHIPPED | OUTPATIENT
Start: 2019-12-20 | End: 2020-02-18

## 2019-12-30 ENCOUNTER — TRANSFERRED RECORDS (OUTPATIENT)
Dept: FAMILY MEDICINE | Facility: CLINIC | Age: 59
End: 2019-12-30

## 2019-12-31 ENCOUNTER — OFFICE VISIT (OUTPATIENT)
Dept: FAMILY MEDICINE | Facility: CLINIC | Age: 59
End: 2019-12-31

## 2019-12-31 VITALS
SYSTOLIC BLOOD PRESSURE: 128 MMHG | OXYGEN SATURATION: 98 % | DIASTOLIC BLOOD PRESSURE: 84 MMHG | WEIGHT: 237 LBS | HEART RATE: 87 BPM | TEMPERATURE: 96.8 F | BODY MASS INDEX: 39.44 KG/M2

## 2019-12-31 DIAGNOSIS — R82.90 ABNORMAL URINE: ICD-10-CM

## 2019-12-31 DIAGNOSIS — R10.9 FLANK PAIN: Primary | ICD-10-CM

## 2019-12-31 LAB
ALBUMIN (URINE): ABNORMAL MG/DL
ALBUMIN SERPL-MCNC: 4.4 G/DL (ref 3.6–5.1)
ALBUMIN/GLOB SERPL: 1.5 {RATIO} (ref 1–2.5)
ALP SERPL-CCNC: 91 U/L (ref 33–130)
ALT 1742-6: 31 U/L (ref 0–32)
APPEARANCE UR: ABNORMAL
AST 1920-8: 34 U/L (ref 0–35)
BACTERIA, UR: ABNORMAL
BILIRUB SERPL-MCNC: 0.5 MG/DL (ref 0.2–1.2)
BILIRUB UR QL: ABNORMAL
BUN SERPL-MCNC: 19 MG/DL (ref 7–25)
BUN/CREATININE RATIO: 29.2
CALCIUM SERPL-MCNC: 8.9 MG/DL (ref 8.6–10.3)
CASTS/LPF: ABNORMAL
CHLORIDE SERPLBLD-SCNC: 103.9 MMOL/L (ref 98–110)
CO2 SERPL-SCNC: 28.8 MMOL/L (ref 20–32)
COLOR UR: YELLOW
CREAT SERPL-MCNC: 0.65 MG/DL (ref 0.7–1.18)
EP/HPF: ABNORMAL
ERYTHROCYTE [DISTWIDTH] IN BLOOD BY AUTOMATED COUNT: 12.3 %
GLOBULIN, CALCULATED - QUEST: 3 (ref 1.9–3.7)
GLUCOSE SERPL-MCNC: 101 MG/DL (ref 60–99)
GLUCOSE URINE: ABNORMAL MG/DL
HCT VFR BLD AUTO: 41.4 % (ref 35–47)
HEMOGLOBIN: 13.4 G/DL (ref 11.7–15.7)
HGB UR QL: ABNORMAL
KETONES UR QL: ABNORMAL MG/DL
LEUKOCYTE ESTERASE - QUEST: ABNORMAL
MCH RBC QN AUTO: 29.5 PG (ref 26–33)
MCHC RBC AUTO-ENTMCNC: 32.4 G/DL (ref 31–36)
MCV RBC AUTO: 90.9 FL (ref 78–100)
MISC.: ABNORMAL
NITRITE UR QL STRIP: ABNORMAL
PH UR STRIP: 6 PH (ref 5–7)
PLATELET COUNT - QUEST: 252 10^9/L (ref 150–375)
POTASSIUM SERPL-SCNC: 4.1 MMOL/L (ref 3.5–5.3)
PROT SERPL-MCNC: 7.4 G/DL (ref 6.1–8.1)
RBC # BLD AUTO: 4.55 10*12/L (ref 3.8–5.2)
RBC, UR MICRO: ABNORMAL (ref ?–2)
SODIUM SERPL-SCNC: 140.7 MMOL/L (ref 135–146)
SP. GRAVITY: 1.01
UROBILINOGEN UR QL STRIP: 0.2 EU/DL (ref 0.2–1)
WBC # BLD AUTO: 6.3 10*9/L (ref 4–11)
WBC, UR MICRO: ABNORMAL (ref ?–2)

## 2019-12-31 PROCEDURE — 36415 COLL VENOUS BLD VENIPUNCTURE: CPT | Performed by: PHYSICIAN ASSISTANT

## 2019-12-31 PROCEDURE — 80053 COMPREHEN METABOLIC PANEL: CPT | Performed by: PHYSICIAN ASSISTANT

## 2019-12-31 PROCEDURE — 81001 URINALYSIS AUTO W/SCOPE: CPT | Performed by: PHYSICIAN ASSISTANT

## 2019-12-31 PROCEDURE — 99213 OFFICE O/P EST LOW 20 MIN: CPT | Performed by: PHYSICIAN ASSISTANT

## 2019-12-31 PROCEDURE — 85027 COMPLETE CBC AUTOMATED: CPT | Performed by: PHYSICIAN ASSISTANT

## 2019-12-31 NOTE — PROGRESS NOTES
CC: Flank pain    History:  Ana Luisa is here today with concerns over right upper abdominal pain that has been ongoing for 1 year. Pain is constant when she pushes on it, but at rest is okay. Certain twisting or bending motion can worsen. No food trigger. She had mentioned this at 11/25/2019 appt, as it had been exacerbated by a fall that she had. Did have US of gallbladder that was normal 5/19/2019. Had not had a CT scan since symptoms started. Since that time, symptoms did seem to get mildly worse, but then just recently got better. Brought in urine sample from home as it looked very dark this morning. Admits this wasn't a clean catch sample.     PMH, MEDICATIONS, ALLERGIES, SOCIAL AND FAMILY HISTORY in [facility] and reviewed by me personally.    ROS negative other than the symptoms noted above in the HPI.      Examination   /84 (BP Location: Left arm, Patient Position: Sitting, Cuff Size: Adult Large)   Pulse 87   Temp 96.8  F (36  C) (Oral)   Wt 107.5 kg (237 lb)   LMP 07/29/2013   SpO2 98%   BMI 39.44 kg/m         Constitutional: Sitting comfortably, in no acute distress. Vital signs noted  Neck:  no adenopathy, trachea midline and normal to palpation  Cardiovascular:  regular rate and rhythm, no murmurs, clicks, or gallops  Respiratory:  normal respiratory rate and rhythm, lungs clear to auscultation  Abdomen: Abdomen soft, non-tender. BS normal. No masses, organomegaly  M/S: Tender to palpation over Ribs 9-10 on right anterior side. Not obviously displaced.   SKIN: No jaundice/pallor/rash.   Psychiatric: mentation appears normal and affect normal/bright        A/P    ICD-10-CM    1. Flank pain R10.9 HCL  Urinalysis, Routine ([facility])   2. Abnormal urine R82.90 HCL  Urinalysis, Routine ([facility])     Urine Culture Aerobic Bacterial     VENOUS COLLECTION     Comprehensive Metobolic Panel ([facility])     HEMOGRAM/PLATELET ([facility])       DISCUSSION:  UA today left in clinic is normal. RBCs seen 11/2019 have resolved. Will check  CMP and CBC and contact with results when available. Did place standing orders for UA/UC if patient brings back clean catch sample from home. If rb pain continues would likely have her meet with physical pain medicine specialist or consider a CT scan of abdomen/pelvis.     follow up visit: As needed    Shantelle Brian PA-C  [facility]

## 2019-12-31 NOTE — NURSING NOTE
Ana Luisa is here for right sided flank pain and blood in urine.          Pre-visit Screening:  Immunizations:  up to date  Colonoscopy:  is up to date  Mammogram: is up to date  Asthma Action Test/Plan:  NA  PHQ9:  None  GAD7:  None  Questioned patient about current smoking habits Pt. quit smoking some time ago.  Ok to leave detailed message on voice mail for today's visit only Yes, phone # [phone]

## 2020-01-02 ENCOUNTER — MYC MEDICAL ADVICE (OUTPATIENT)
Dept: FAMILY MEDICINE | Facility: CLINIC | Age: 60
End: 2020-01-02

## 2020-01-03 ENCOUNTER — OFFICE VISIT (OUTPATIENT)
Dept: FAMILY MEDICINE | Facility: CLINIC | Age: 60
End: 2020-01-03

## 2020-01-03 DIAGNOSIS — R82.90 ABNORMAL URINE: ICD-10-CM

## 2020-01-03 DIAGNOSIS — N30.00 ACUTE CYSTITIS WITHOUT HEMATURIA: Primary | ICD-10-CM

## 2020-01-03 LAB
ALBUMIN (URINE): ABNORMAL MG/DL
APPEARANCE UR: ABNORMAL
BACTERIA, UR: ABNORMAL
BILIRUB UR QL: ABNORMAL
CASTS/LPF: ABNORMAL
COLOR UR: ABNORMAL
EP/HPF: ABNORMAL
GLUCOSE URINE: ABNORMAL MG/DL
HGB UR QL: ABNORMAL
KETONES UR QL: ABNORMAL MG/DL
LEUKOCYTE ESTERASE - QUEST: ABNORMAL
MISC.: ABNORMAL
NITRITE UR QL STRIP: ABNORMAL
PH UR STRIP: 5 PH (ref 5–7)
RBC, UR MICRO: ABNORMAL (ref ?–2)
SP. GRAVITY: >=1.03
UROBILINOGEN UR QL STRIP: 0.2 EU/DL (ref 0.2–1)
WBC, UR MICRO: ABNORMAL (ref ?–2)

## 2020-01-03 PROCEDURE — 87086 URINE CULTURE/COLONY COUNT: CPT | Mod: 90 | Performed by: PHYSICIAN ASSISTANT

## 2020-01-03 PROCEDURE — 87186 SC STD MICRODIL/AGAR DIL: CPT | Mod: 90 | Performed by: PHYSICIAN ASSISTANT

## 2020-01-03 PROCEDURE — 87077 CULTURE AEROBIC IDENTIFY: CPT | Mod: 90 | Performed by: PHYSICIAN ASSISTANT

## 2020-01-03 PROCEDURE — 81001 URINALYSIS AUTO W/SCOPE: CPT | Performed by: PHYSICIAN ASSISTANT

## 2020-01-03 PROCEDURE — 87088 URINE BACTERIA CULTURE: CPT | Mod: 90 | Performed by: PHYSICIAN ASSISTANT

## 2020-01-06 ENCOUNTER — MYC MEDICAL ADVICE (OUTPATIENT)
Dept: FAMILY MEDICINE | Facility: CLINIC | Age: 60
End: 2020-01-06

## 2020-01-06 LAB — URINE VOIDED CULTURE: ABNORMAL

## 2020-01-06 RX ORDER — NITROFURANTOIN 25; 75 MG/1; MG/1
100 CAPSULE ORAL 2 TIMES DAILY
Qty: 14 CAPSULE | Refills: 0 | Status: SHIPPED | OUTPATIENT
Start: 2020-01-06 | End: 2020-02-18

## 2020-01-11 ENCOUNTER — TRANSFERRED RECORDS (OUTPATIENT)
Dept: FAMILY MEDICINE | Facility: CLINIC | Age: 60
End: 2020-01-11

## 2020-02-18 ENCOUNTER — OFFICE VISIT (OUTPATIENT)
Dept: SURGERY | Facility: CLINIC | Age: 60
End: 2020-02-18
Payer: COMMERCIAL

## 2020-02-18 VITALS
DIASTOLIC BLOOD PRESSURE: 82 MMHG | HEIGHT: 65 IN | BODY MASS INDEX: 39.49 KG/M2 | HEART RATE: 74 BPM | RESPIRATION RATE: 16 BRPM | SYSTOLIC BLOOD PRESSURE: 116 MMHG | OXYGEN SATURATION: 99 % | WEIGHT: 237 LBS

## 2020-02-18 DIAGNOSIS — I87.2 VENOUS (PERIPHERAL) INSUFFICIENCY: Primary | ICD-10-CM

## 2020-02-18 PROCEDURE — 99214 OFFICE O/P EST MOD 30 MIN: CPT | Performed by: INTERNAL MEDICINE

## 2020-02-18 ASSESSMENT — MIFFLIN-ST. JEOR: SCORE: 1660.9

## 2020-02-18 NOTE — NURSING NOTE
Chart notes sent to patients PCP in regards to platypnea whenever she lies down she has difficulty in breathing with cough and regurg symptoms, patient might be having signs and symptoms of hiatal hernia.    Margaret FUNEZ, RN    [facility]  Office: [phone]  Fax: [phone]

## 2020-02-18 NOTE — Clinical Note
Patient is complaining of platypnea whenever she lies down she has difficulty in breathing with cough and regurg symptoms, patient might be having signs and symptoms of hiatal hernia, patient has been cleared from cardiology service she has no evidence of congestive heart failure. Referring to PCP for management.

## 2020-02-18 NOTE — PROGRESS NOTES
Vascular Medicine Progress Note     Ana Luisa Rao is a 59 year old female who is here for follow-up on venous insufficiency    Interval History   Patient has symptomatic venous insufficiency mainly right lower extremity with right GSV reflux in addition she has multiple varicose veins bilateral lower extremities and they are symptomatic patient wears her compression stockings she has been wearing compression stockings for the past 6 months yet she is still symptomatic no spontaneous bleeding or spontaneous ulceration        Physical Exam       BP: 116/82 Pulse: 74   Resp: 16 SpO2: 99 %      Vitals:    02/18/20 0851   Weight: 107.5 kg (237 lb)     Vital Signs with Ranges  Pulse:  [74] 74  Resp:  [16] 16  BP: (116)/(82) 116/82  SpO2:  [99 %] 99 %  [unfilled]    Constitutional: awake, alert, cooperative, no apparent distress, and appears stated age  Eyes: Lids and lashes normal, pupils equal, round and reactive to light, extra ocular muscles intact, sclera clear, conjunctiva normal  ENT: normocepalic, without obvious abnormality, oropharynx pink and moist  Hematologic / Lymphatic: no lymphadenopathy  Respiratory: No increased work of breathing, good air exchange, clear to auscultation bilaterally, no crackles or wheezing  Cardiovascular: regular rate and rhythm, normal S1 and S2 and no murmur noted  GI: Normal bowel sounds, soft, non-distended, non-tender  Skin: no redness, warmth, or swelling, no rashes and no lesions  Musculoskeletal: There is no redness, warmth, or swelling of the joints.  Full range of motion noted.  Motor strength is 5 out of 5 all extremities bilaterally.  Tone is normal.  Neurologic: Awake, alert, oriented to name, place and time.  Cranial nerves II-XII are grossly intact.  Motor is 5 out of 5 bilaterally.    Neuropsychiatric:  Normal affect, memory, insight.  Pulses: Palpable bilateral and equal  Scattered varicose veins, symptomatic, painful, and tender to touch no evidence of  thrombosis  . No carotid bruits appreciated.     Medications         Data   No results found for this or any previous visit (from the past 24 hour(s)).    Assessment & Plan   No diagnosis found.      Summary: Continue with compression treatment,  Patient would benefit from sclerotherapy, patient will probably need 2 sessions each leg    P/S, patient is complaining of platypnea whenever she lies down she has difficulty in breathing with cough and regurg symptoms, patient might be having signs and symptoms of hiatal hernia, patient has been cleared from cardiology service she has no evidence of congestive heart failure  We will refer the patient to her PCP for further management regarding her platypnea    Braden Lezama MD

## 2020-02-28 ENCOUNTER — MYC MEDICAL ADVICE (OUTPATIENT)
Dept: FAMILY MEDICINE | Facility: CLINIC | Age: 60
End: 2020-02-28

## 2020-02-28 DIAGNOSIS — K22.2 SCHATZKI'S RING: ICD-10-CM

## 2020-02-28 NOTE — TELEPHONE ENCOUNTER
Ana Luisa Rao is requesting a refill of:    Pending Prescriptions:                       Disp   Refills    omeprazole (PRILOSEC) 20 MG DR capsule    180 ca*0            Sig: Take 1 capsule (20 mg) by mouth 2 times daily    Please close encounter if RX was sent. Thanks, Judith

## 2020-03-04 ENCOUNTER — TELEPHONE (OUTPATIENT)
Dept: FAMILY MEDICINE | Facility: CLINIC | Age: 60
End: 2020-03-04

## 2020-03-04 NOTE — TELEPHONE ENCOUNTER
Patient called asking for a diagnostic mammogram, found a lump in her left breast.     I went over with patient that she will need an appt with Shantelle or cara provider for a breast exam. The provider needs to rule out if other services are needed.     Patient has an appt to see LES 3/5/2020 @ 5:45pm

## 2020-03-05 ENCOUNTER — OFFICE VISIT (OUTPATIENT)
Dept: FAMILY MEDICINE | Facility: CLINIC | Age: 60
End: 2020-03-05

## 2020-03-05 VITALS
OXYGEN SATURATION: 99 % | HEIGHT: 65 IN | HEART RATE: 70 BPM | TEMPERATURE: 97.7 F | WEIGHT: 233 LBS | SYSTOLIC BLOOD PRESSURE: 122 MMHG | DIASTOLIC BLOOD PRESSURE: 86 MMHG | BODY MASS INDEX: 38.82 KG/M2 | RESPIRATION RATE: 20 BRPM

## 2020-03-05 DIAGNOSIS — Z87.440 RECENT URINARY TRACT INFECTION: ICD-10-CM

## 2020-03-05 DIAGNOSIS — N60.12 FIBROCYSTIC BREAST CHANGES OF BOTH BREASTS: ICD-10-CM

## 2020-03-05 DIAGNOSIS — N60.11 FIBROCYSTIC BREAST CHANGES OF BOTH BREASTS: ICD-10-CM

## 2020-03-05 DIAGNOSIS — N63.0 LUMP OR MASS IN BREAST: Primary | ICD-10-CM

## 2020-03-05 LAB
ALBUMIN (URINE): ABNORMAL MG/DL
APPEARANCE UR: CLEAR
BACTERIA, UR: ABNORMAL
BILIRUB UR QL: ABNORMAL
CASTS/LPF: ABNORMAL
COLOR UR: YELLOW
EP/HPF: ABNORMAL
GLUCOSE URINE: ABNORMAL MG/DL
HGB UR QL: ABNORMAL
KETONES UR QL: ABNORMAL MG/DL
LEUKOCYTE ESTERASE - QUEST: ABNORMAL
MISC.: ABNORMAL
NITRITE UR QL STRIP: ABNORMAL
PH UR STRIP: 8.5 PH (ref 5–7)
RBC, UR MICRO: ABNORMAL (ref ?–2)
SP. GRAVITY: 1.02
UROBILINOGEN UR QL STRIP: 0.2 EU/DL (ref 0.2–1)
WBC, UR MICRO: ABNORMAL (ref ?–2)

## 2020-03-05 PROCEDURE — 99214 OFFICE O/P EST MOD 30 MIN: CPT | Performed by: FAMILY MEDICINE

## 2020-03-05 PROCEDURE — 81001 URINALYSIS AUTO W/SCOPE: CPT | Performed by: FAMILY MEDICINE

## 2020-03-05 SDOH — ECONOMIC STABILITY: INCOME INSECURITY: HOW HARD IS IT FOR YOU TO PAY FOR THE VERY BASICS LIKE FOOD, HOUSING, MEDICAL CARE, AND HEATING?: NOT HARD AT ALL

## 2020-03-05 SDOH — ECONOMIC STABILITY: FOOD INSECURITY: WITHIN THE PAST 12 MONTHS, THE FOOD YOU BOUGHT JUST DIDN'T LAST AND YOU DIDN'T HAVE MONEY TO GET MORE.: NEVER TRUE

## 2020-03-05 SDOH — ECONOMIC STABILITY: TRANSPORTATION INSECURITY
IN THE PAST 12 MONTHS, HAS THE LACK OF TRANSPORTATION KEPT YOU FROM MEDICAL APPOINTMENTS OR FROM GETTING MEDICATIONS?: NO

## 2020-03-05 SDOH — ECONOMIC STABILITY: FOOD INSECURITY: WITHIN THE PAST 12 MONTHS, YOU WORRIED THAT YOUR FOOD WOULD RUN OUT BEFORE YOU GOT MONEY TO BUY MORE.: NEVER TRUE

## 2020-03-05 SDOH — ECONOMIC STABILITY: TRANSPORTATION INSECURITY
IN THE PAST 12 MONTHS, HAS LACK OF TRANSPORTATION KEPT YOU FROM MEETINGS, WORK, OR FROM GETTING THINGS NEEDED FOR DAILY LIVING?: NO

## 2020-03-05 ASSESSMENT — MIFFLIN-ST. JEOR: SCORE: 1642.76

## 2020-03-05 NOTE — NURSING NOTE
Chief Complaint   Patient presents with     Breast Mass     lump in left breast, is not painful, has noticed skin change aroud the lump area, first noticed skin change 3-4 months ago and noticed the lump within the last week       Pre-visit Screening:  Immunizations:  up to date  Colonoscopy:  Is now due but patient will schedule at another time  Mammogram: is up to date  Asthma Action Test/Plan:  NA  PHQ9:  NA  GAD7:  NA  Questioned patient about current smoking habits Pt. Is a former smoker.   Ok to leave detailed message on voice mail for today's visit only Yes, phone # [phone]

## 2020-03-05 NOTE — PROGRESS NOTES
SUBJECTIVE: 59 year old female complaining of a left breast lump for 1 week(s).   The patient describes seeing skin changes under her breast with darkness for a long time. Then noticed a left lower medial breast fullness on the lower breast fold with a small lump. She has had breast biopsy in the right breast benign and a right nodule followed since 2009 without issue.   The patient denies a history of breast pain, nipple discharge or breast cancer family history.   Smoking history: No.   Relevant past medical history: positive for recent UTI with lots of amorphous crystals/ feeling better but would like a recheck of her urinalysis.    OBJECTIVE: The patient appears healthy, alert, no distress, cooperative, smiling and over weight.   EARS: negative  NOSE/SINUS: Nares normal. Septum midline. Mucosa normal. No drainage or sinus tenderness.   THROAT: normal   NECK:Neck supple. No adenopathy. Thyroid symmetric, normal size,, Carotids without bruits.   CHEST: Clear  BREASTS: Breasts are symmetric.  Fibrocystic changes throughout. Lower breast fold skin thickening corresponding to under wire length proximal to lateral both breasts. Darkened skin without lesions. Small medial along this thickened skin nodular 1 cm area  No skin or nipple changes or axillary nodes.  ABD: The abdomen is soft without tenderness, guarding, mass or organomegaly. Bowel sounds are normal. No CVA tenderness or inguinal adenopathy noted.  BMI : Body mass index is 38.77 kg/m .      UA: clear    ASSESSMENT: (N63.0) Lump or mass in breast  (primary encounter diagnosis)  Comment: reviewed mammogram 6/2019  Plan: MA Diagnostic Digital Left        Schedule a diagnostic mammogram for this area    (N60.11,  N60.12) Fibrocystic breast changes of both breasts  Plan: MA Diagnostic Digital Left        I have reviewed the patient's medical history in detail and updated the computerized patient record.      (Z87.062) Recent urinary tract infection  Comment:  resolved  Plan: HCL  Urinalysis, Routine ([facility])        Patient was instructed to drink plenty of fluids.

## 2020-03-06 ENCOUNTER — TELEPHONE (OUTPATIENT)
Dept: VASCULAR SURGERY | Facility: CLINIC | Age: 60
End: 2020-03-06

## 2020-03-06 NOTE — TELEPHONE ENCOUNTER
Called [employer] [place] spoke to Sylvia she stated sclerotherapy is a plan exclusion and is not covered. Cannot do an appeal as it would still be denied.   Ref # [id number]  Please call pt to let her know.       Myrtle Pierce  Specialty CSS

## 2020-03-09 ENCOUNTER — TRANSFERRED RECORDS (OUTPATIENT)
Dept: FAMILY MEDICINE | Facility: CLINIC | Age: 60
End: 2020-03-09

## 2020-03-09 NOTE — TELEPHONE ENCOUNTER
Updated patient via [email or web address].  Ebony Romero RN BSN  [facility]

## 2020-03-10 ENCOUNTER — HOSPITAL ENCOUNTER (OUTPATIENT)
Dept: MAMMOGRAPHY | Facility: CLINIC | Age: 60
End: 2020-03-10
Attending: FAMILY MEDICINE
Payer: COMMERCIAL

## 2020-03-10 ENCOUNTER — HOSPITAL ENCOUNTER (OUTPATIENT)
Dept: ULTRASOUND IMAGING | Facility: CLINIC | Age: 60
End: 2020-03-10
Attending: FAMILY MEDICINE
Payer: COMMERCIAL

## 2020-03-10 DIAGNOSIS — N63.0 LUMP OR MASS IN BREAST: ICD-10-CM

## 2020-03-10 DIAGNOSIS — N60.11 FIBROCYSTIC BREAST CHANGES OF BOTH BREASTS: ICD-10-CM

## 2020-03-10 DIAGNOSIS — N60.12 FIBROCYSTIC BREAST CHANGES OF BOTH BREASTS: ICD-10-CM

## 2020-03-10 PROCEDURE — 77066 DX MAMMO INCL CAD BI: CPT

## 2020-03-10 PROCEDURE — 76642 ULTRASOUND BREAST LIMITED: CPT | Mod: LT

## 2020-05-22 ENCOUNTER — MYC MEDICAL ADVICE (OUTPATIENT)
Dept: FAMILY MEDICINE | Facility: CLINIC | Age: 60
End: 2020-05-22

## 2020-08-21 ENCOUNTER — MYC MEDICAL ADVICE (OUTPATIENT)
Dept: FAMILY MEDICINE | Facility: CLINIC | Age: 60
End: 2020-08-21

## 2020-08-21 DIAGNOSIS — M79.2 NEUROPATHIC PAIN SYNDROME (NON-HERPETIC): ICD-10-CM

## 2020-08-21 RX ORDER — GABAPENTIN 300 MG/1
CAPSULE ORAL
Qty: 270 CAPSULE | Refills: 3 | Status: CANCELLED | OUTPATIENT
Start: 2020-08-21

## 2020-08-21 NOTE — TELEPHONE ENCOUNTER
Pt needs an OV for medication if we have not prescribed in several years. Can she do a telemed, or do you need blood work from her?    Please advise  Thanks,Judith

## 2020-08-21 NOTE — TELEPHONE ENCOUNTER
Routing to Shantelle for review, patient is requesting a refill of her gabapentin which has not been prescribed from you since 2017. Do you want her to come in for an appointment to further discuss this? She was last seen in clinic on 3/5/2020 for a few concerns with Dr. Ortega.

## 2020-08-25 DIAGNOSIS — K22.2 SCHATZKI'S RING: ICD-10-CM

## 2020-08-25 NOTE — TELEPHONE ENCOUNTER
Ana Luisa Rao is requesting a refill of:    Pending Prescriptions:                       Disp   Refills    omeprazole (PRILOSEC) 20 MG DR capsule    90 cap*1            Sig: Take 1 capsule (20 mg) by mouth daily    Please close encounter if RX was sent. Thanks, Judith

## 2020-08-25 NOTE — TELEPHONE ENCOUNTER
Routing to Shantelle for review. I did not see at first that the patient is no longer seeing Dr. Aguirre but she did say that she will just discontinue this medication.

## 2020-08-26 RX ORDER — GABAPENTIN 300 MG/1
CAPSULE ORAL
Qty: 360 CAPSULE | Refills: 0 | Status: SHIPPED | OUTPATIENT
Start: 2020-08-26 | End: 2021-06-18

## 2020-11-20 ENCOUNTER — TRANSFERRED RECORDS (OUTPATIENT)
Dept: FAMILY MEDICINE | Facility: CLINIC | Age: 60
End: 2020-11-20

## 2021-01-15 ENCOUNTER — HEALTH MAINTENANCE LETTER (OUTPATIENT)
Age: 61
End: 2021-01-15

## 2021-03-16 ENCOUNTER — TELEPHONE (OUTPATIENT)
Dept: OTHER | Facility: CLINIC | Age: 61
End: 2021-03-16

## 2021-03-16 ENCOUNTER — HOSPITAL ENCOUNTER (OUTPATIENT)
Dept: MAMMOGRAPHY | Facility: CLINIC | Age: 61
Discharge: HOME | End: 2021-03-16
Attending: PHYSICIAN ASSISTANT | Admitting: PHYSICIAN ASSISTANT
Payer: COMMERCIAL

## 2021-03-16 DIAGNOSIS — M79.669 PAIN OF LOWER LEG, UNSPECIFIED LATERALITY: Primary | ICD-10-CM

## 2021-03-16 DIAGNOSIS — Z12.31 VISIT FOR SCREENING MAMMOGRAM: ICD-10-CM

## 2021-03-16 PROCEDURE — 77067 SCR MAMMO BI INCL CAD: CPT

## 2021-03-16 NOTE — TELEPHONE ENCOUNTER
"Called patient to learn more about her symptoms:    Ana Luisa states this is a continuation of the original reason she saw Dr. Lezama but it is getting worse and is starting to interferer with her life.    She has difficulty standing even until noon.  She also cannot stoop or squat.  It feels like there is intense squeezing right below her knees down to her feet.  Her legs go numb.  When she stands back up she can feel the blood rushing back into the lower part of her legs.    Swelling:  Nothing new.     Tender to the touch.  No discoloration.  Left leg and feet \"go numb\" when she squats or moe.    She has been wearing the thigh high compression stockings while out of bed.  (Summer was a little more challenging, and the bunching is painful).    Patient's last imaging July 2018: ELVA resting in and post exercise were completely normal, venous insufficiency study showed evidence of right GS V reflux      MR Lumbar spine July 2018:                                                                   IMPRESSION:    1. At L4-L5 there is mild to moderate bilateral foraminal stenosis.  2. At L5-S1 there is mild left foraminal stenosis.  3. At L3-L4 there is mild disc bulge without stenosis.    I explained to Ana Luisa that I would confer with Dr. Lezama about her recent symptoms to determine if venous competency exam or other imaging should be repeated, or if she should have her primary care provider refer her to ortho/spine for her lumbar stenosis.    Ebony Romero RN BSN  [facility]  [phone]      "

## 2021-03-16 NOTE — TELEPHONE ENCOUNTER
Patient calling to report worsening symptoms; Pain in lower legs, difficult to walk, squatting and or bending down. Patient is requesting for appt with Dr. Lezama.     Informed patient will send message to nurse to review if any imaging needed and will call back when instructions given.        Joanna CARLTON

## 2021-03-16 NOTE — TELEPHONE ENCOUNTER
Discussed the below with Dr. Lezama. He would like to obtain ELVA US exercise and if normal refer patient to ortho/spine.  Called Ana Luisa and discussed.  Routing to scheduling to call patient and schedule ELVA US in next week or so- will call with results.    Margaret NOWAKN, RN    [facility]  Office: [phone]  Fax: [phone]

## 2021-03-23 ENCOUNTER — MYC MEDICAL ADVICE (OUTPATIENT)
Dept: OTHER | Facility: CLINIC | Age: 61
End: 2021-03-23

## 2021-03-23 ENCOUNTER — HOSPITAL ENCOUNTER (OUTPATIENT)
Dept: ULTRASOUND IMAGING | Facility: CLINIC | Age: 61
Discharge: HOME | End: 2021-03-23
Attending: INTERNAL MEDICINE | Admitting: INTERNAL MEDICINE
Payer: COMMERCIAL

## 2021-03-23 DIAGNOSIS — M79.669 PAIN OF LOWER LEG, UNSPECIFIED LATERALITY: Primary | ICD-10-CM

## 2021-03-23 DIAGNOSIS — M79.669 PAIN OF LOWER LEG, UNSPECIFIED LATERALITY: ICD-10-CM

## 2021-03-23 PROCEDURE — 93924 LWR XTR VASC STDY BILAT: CPT

## 2021-04-02 ENCOUNTER — OFFICE VISIT (OUTPATIENT)
Dept: PALLIATIVE MEDICINE | Facility: CLINIC | Age: 61
End: 2021-04-02
Payer: COMMERCIAL

## 2021-04-02 VITALS — OXYGEN SATURATION: 96 % | DIASTOLIC BLOOD PRESSURE: 82 MMHG | HEART RATE: 85 BPM | SYSTOLIC BLOOD PRESSURE: 129 MMHG

## 2021-04-02 DIAGNOSIS — G89.29 CHRONIC BILATERAL LOW BACK PAIN WITHOUT SCIATICA: Primary | ICD-10-CM

## 2021-04-02 DIAGNOSIS — M54.50 CHRONIC BILATERAL LOW BACK PAIN WITHOUT SCIATICA: Primary | ICD-10-CM

## 2021-04-02 DIAGNOSIS — M47.812 CERVICAL SPONDYLOSIS WITHOUT MYELOPATHY: ICD-10-CM

## 2021-04-02 DIAGNOSIS — M54.12 CERVICAL RADICULITIS: ICD-10-CM

## 2021-04-02 PROCEDURE — 99203 OFFICE O/P NEW LOW 30 MIN: CPT | Performed by: PHYSICAL MEDICINE & REHABILITATION

## 2021-04-02 ASSESSMENT — PAIN SCALES - GENERAL: PAINLEVEL: MODERATE PAIN (5)

## 2021-04-02 NOTE — PROGRESS NOTES
[facility] Medical Spine Consultation    Date of visit: 4/2/2021    Assessment:  Ana Luisa Rao is a 61 year old female with a past medical history significant for neuropathic pain, fibromyalgia, obesity, PTSD who presents with complaints of:    1. Chronic low back pain: Pain in the low back seems to be more axial in nature. No clear radicular symptoms. She does have pain and paresthesias in the lower legs in a non-dermatomal pattern. Cannot exclude possibility of lumbar stenosis contributing to her symptoms. She has no recent imaging of the lumbar spine. Last MRI was in 2018.   2. Chronic neck pain: s/p C6-7 fusion. She has ongoing neck pain with radiation into bilateral arms with associated numbness. Seems to be worsening. Based on exam there is a component of myofascial pain contributing but cannot exclude nerve root impingement.       Plan:  The following recommendations were given to the patient. Diagnosis, treatment options, risks, benefits, and alternatives were discussed, and all questions were answered. The patient expressed understanding of the plan for management.     1. Therapies:  Not at this time. Consider PT for back pain based on results of imaging.   2. Diagnostic Studies:   1. Order placed for cervical MRI to evaluate for any potential ongoing nerve root impingement given increased neck and bilateral upper extremity pain.   2. Order placed for lumbar MRI to evaluate for potential spinal canal stenosis as potential cause for lower extremity symptoms.   3. Medication Management: No medication changes made today.      4. Procedures recommended: None at this time.   5. Recommend she discuss with her PCP if lymphedema could be contributing to lower leg symptoms.   6. Follow up: Will call with results of imaging.     Reason for consultation:    Primary Care Provider is Shantelle Garcia.    Ana Luisa Rao is a 61 year old female with a history of neuropathic pain, fibromyalgia, obesity, PTSD  who I was asked to see in consultation by Braden Lezama  for evaluation of back and leg pain.     Consultation and Evaluation for: Back pain    Review of Electronic Chart: Today I have also reviewed available medical information in the patient's medical record at [facility] ([facility] including relevant provider notes, laboratory work, and imaging.     Chief Complaint:    Chief Complaint   Patient presents with     Pain     History:  Ana Luisa Rao is a 61 year old female who presents for initial evaluation of chief pain complaint of back and leg pain. Back pain has been intermittent over the past 3 years or so. It was insidious in onset. Located in bilateral low back. Pain is intermittent. Aggravated with standing for at least 10 minutes. It is sharp in quality. No spasms. Doesn't feel muscular. She was seen at [facility] last November for this and an MRI was ordered. It was not completed because it was not covered by insurance. She also endorsed having lower leg pain and numbness. She saw vascular for this. Symptoms have been ongoing for at least 15 years. ELVA was done which was normal. Symptoms are aggravated with squatting and she has some irritation with standing.     (Addendum: She reports instances of numbness in her leg when sitting on the toilet. Reports couple falls  After getting off of the toilet due to the numbness)    She also endorses having neck and arm pain. She has a hx of cervical fusion at C6-7. She reports she has pain and numbness going into both arms. Worse when she lays on a particular side. She has bilateral neck pain as well.     Red Flags: The patient denies bowel or bladder incontinence, saddle anesthesia, unintentional weight loss, or fever/chills/sweats. She reports sometimes finds herself catching her toes when walking. Doesn't remmber which foot caused the issue in the past.     Medications:       Current pain medications:  Gabapentin 300/300/600mg - [facility] for back or leg pain. [facility] for neck  pain  Ibuprofen 400 mg daily prn - [facility] for leg pain    Past Pain Treatments:  PT: Yes  - has been many years ago  TENs Unit: No  Injections: Has had cervical injections but no lumbar injections  Self-care:   Heat - [facility], ice - [facility]  Surgeries related to pain: cervical fusion C6-7  Alternative Therapies:    Chiropractic: No   Acupuncture: Yes - [facility]  Other: None    Diagnostic tests:  MRI of Lumbar Spine was completed on 7/13/2018 was reviewed with the patient and shows:  FINDINGS: X-ray demonstrates five nonrib-bearing lumbar-type vertebra.  Sagittal images demonstrate normal vertebral body height. Benign  hemangioma T12 and L2. Bone marrow signal is otherwise unremarkable.  Tip of the conus medullaris and cauda equina are unremarkable.     T12-L1:  No disc herniation or stenosis. Facet joints are  unremarkable.     L1-L2:  No disc herniation or stenosis. Facet joints are unremarkable.        L2-L3:  No disc herniation or stenosis. Facet joints are unremarkable.     L3-L4:  Mild disc bulge. No central or foraminal stenosis. Facet  joints are unremarkable.     L4-L5:  Mild to moderate broad-based disc bulge. Mild ligamentum  flavum hypertrophy. No central stenosis. Mild to moderate bilateral  foraminal stenosis.     L5-S1:  Mild to moderate facet degenerative changes. Broad-based disc  bulge. No central stenosis. Mild left foraminal stenosis.     Paraspinous soft tissues:  Unremarkable.                                                                      IMPRESSION:    1. At L4-L5 there is mild to moderate bilateral foraminal stenosis.  2. At L5-S1 there is mild left foraminal stenosis.  3. At L3-L4 there is mild disc bulge without stenosis.    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     Knee pain      Leg edema      Obesity      Oropharyngeal dysphagia      Palpitations      Peripheral venous insufficiency      Psoriasis      Pulmonary nodules 09/2018    per chest CT-In Care Everywhere     Reflux esophagitis      Restless legs syndrome      Sjogren's disease (H)      SOB (shortness of breath)      SVT (supraventricular tachycardia) (H)        Past Surgical History:  Past Surgical History:   Procedure Laterality Date     ENDOSCOPY  2006, 2008    dysphagia     UPPER GI ENDOSCOPY  2/2013    GERD/ Schatzke's ring     UPPER GI ENDOSCOPY  04/13/2017    [facility] ring       Medications:  Current Outpatient Medications   Medication Sig Dispense Refill     augmented betamethasone dipropionate (DIPROLENE-AF) 0.05 % external cream        BENADRYL DECONGESTANT 25-60 MG OR TABS 1 TABLET EVERY 6 HOURS AS NEEDED       clindamycin (CLEOCIN T) 1 % external solution        clobetasol (TEMOVATE) 0.05 % cream Apply sparingly to affected area twice daily as needed.  Do not apply to face. 30 g 0     doxycycline monohydrate (MONODOX) 100 MG capsule        fluocinonide (LIDEX) 0.05 % external cream        fluticasone (FLONASE) 50 MCG/ACT nasal spray USE 1-2 SPRAYS INTO BOTH NOSTRILS ONCE DAILY 16 g 11     gabapentin (NEURONTIN) 300 MG capsule Take 1 capsule (300 mg) by mouth in afternoon, 1 in evening (300 mg), and 2 capsules (600 mg) at bedtime. 360 capsule 0     gabapentin (NEURONTIN) 300 MG capsule TAKE ONE CAPUSULE BY MOUTH EVERY MORNING AND TWO CAPSULES DAILY BEFORE BED. (Patient taking differently: Takes 4 tablets at bedtime) 270 capsule 3     hydrocortisone 2.5 % lotion        IBUPROFEN 200 MG OR TABS 1 TABLET EVERY 4 TO 6 HOURS AS NEEDED       MULTIVITAMIN OR None Entered       omeprazole (PRILOSEC) 20 MG DR capsule Take 1 capsule (20 mg) by mouth daily 90 capsule 1     polyethylene glycol 0.4%-  "propylene glycol 0.3% (SYSTANE ULTRA) 0.4-0.3 % SOLN ophthalmic solution Apply 1 drop to eye as needed       TUMS 500 MG OR CHEW 1 TABLET 3 TIMES DAILY PRN       zolpidem (AMBIEN) 5 MG tablet TAKE ONE TABLET BY MOUTH EVERY NIGHT AT BEDTIME AS NEEDED FOR SLEEP 30 tablet 1       Allergies:     Allergies   Allergen Reactions     Penicillins        Family history:  Family History   Problem Relation Age of Onset     Connective Tissue Disorder Mother         \"vasculitis\"     Hypertension Father      Cancer - colorectal Maternal Uncle         73     Blood Disease Brother         hemocrhomatosis     Breast Cancer No family hx of      Diabetes No family hx of        Social History:  Home situation: Lives in [place], [place] with   Occupation/Schooling: not working  Tobacco use: none  Drug use: none  Alcohol use: none    Review of Systems:    POSTIVE IN BOLD  GENERAL: fever/chills, fatigue, general unwell feeling, weight gain/loss.  HEAD/EYES:  headache, dizziness, or vision changes.    EARS/NOSE/THROAT:  Nosebleeds, hearing loss, sinus infection, earache, tinnitus.  IMMUNE:  Allergies, cancer, immune deficiency, or infections.  SKIN:  Urticaria, rash, hives  HEME/Lymphatic:   anemia, easy bruising, easy bleeding.  RESPIRATORY:  cough, wheezing, or shortness of breath  CARDIOVASCULAR/Circulation:  Extremity edema, syncope, hypertension, tachycardia, or angina.  GASTROINTESTINAL:  abdominal pain, nausea/emesis, diarrhea, constipation,  hematochezia, or melena.  ENDOCRINE:  Diabetes, steroid use,  thyroid disease or osteoporosis.  MUSCULOSKELETAL: neck pain, back pain, arthralgia, arthritis, or gout.  GENITOURINARY:  frequency, urgency, dysuria, difficulty voiding, hematuria or incontinence.  NEUROLOGIC:  weakness, numbness, paresthesias, seizure, tremor, stroke or memory loss.  PSYCHIATRIC:  depression, anxiety, stress, suicidal thoughts or mood swings.     Physical Exam:  Vitals:    04/02/21 1508   BP: 129/82   Pulse: 85 "   SpO2: 96%     Exam:  Constitutional: Well developed, well nourished, appears stated age.  HEENT: Head atraumatic, normocephalic. Eyes without conjunctival injection or jaundice. Neck supple. No obvious neck masses.  Respiratory: Breathing unlabored on room air  Extremities: Nonpitting edema in bilateral lower extremities.   Psychiatric/mental status: Alert, without lethargy or stupor. Speech fluent. Appropriate affect. Mood normal. Able to follow commands without difficulty.     Musculoskeletal exam:  Gait/Station/Posture:   Normal stance  Normal bulk and tone.     Cervical spine:  Restricted ROM in flexion, extension and lateral rotation to both sides.   Myofascial tenderness: tenderness in bilateral cervical paraspinals and trapezius.   No focal spinous process tenderness.   Spurling's negative bilaterally     Lumbar spine:  Range of motion within normal limits. Some pain with lumbar extension  Myofascial tenderness:  Lumbar paraspinals and gluteal musculature.  Focal tenderness: No SI joint, GT, or IT tenderness    Neurologic exam:  CN:  Cranial nerves 2-12 are grossly intact  Motor Strength:  5/5 symmetric UE and LE strength    Sensory:  (upper and lower extremities):   Light touch: normal    Allodynia: absent    Hyperalgesia: absent     Raquel Kahn MD  [facility] Pain Management       BILLING TIME DOCUMENTATION:   The total TIME spent on this patient on the date of the encounter/appointment was 30 minutes.      TOTAL TIME includes:   Time spent preparing to see the patient (reviewing records and tests)   Time spent face to face (or over the phone) with the patient   Time spent ordering tests, medications, procedures and referrals   Time spent documenting clinical information in Epic

## 2021-04-02 NOTE — PATIENT INSTRUCTIONS
1. Order placed for an updated lumbar and cervical MRI. I will call with results.     2. I do not think the leg pain is related to your back. You can speak with your PCP if she they think lymphedema could be contributing factor.     3. I will call you with imaging results.     Raquel Kahn MD  [facility] Pain Management     ----------------------------------------------------------------  Clinic Number:  [phone]     Call with any questions about your care and for scheduling assistance.     Calls are returned Monday through Friday between 8 AM and 4:30 PM. We usually get back to you within 2 business days depending on the issue/request.    If we are prescribing your medications:    For opioid medication refills, call the clinic or send a [employer] message 7 days in advance.  Please include:    Name of requested medication    Name of the pharmacy.    For non-opioid medications, call your pharmacy directly to request a refill. Please allow 3-4 days to be processed.     Per [place] State Law:    All controlled substance prescriptions must be filled within 30 days of being written.      For those controlled substances allowing refills, pickup must occur within 30 days of last fill.      We believe regular attendance is key to your success in our program!      Any time you are unable to keep your appointment we ask that you call us at least 24 hours in advance to cancel.This will allow us to offer the appointment time to another patient.     Multiple missed appointments may lead to dismissal from the clinic.

## 2021-04-09 ENCOUNTER — OFFICE VISIT (OUTPATIENT)
Dept: PODIATRY | Facility: CLINIC | Age: 61
End: 2021-04-09
Payer: COMMERCIAL

## 2021-04-09 VITALS
HEIGHT: 65 IN | DIASTOLIC BLOOD PRESSURE: 75 MMHG | BODY MASS INDEX: 37.67 KG/M2 | SYSTOLIC BLOOD PRESSURE: 135 MMHG | WEIGHT: 226.1 LBS

## 2021-04-09 DIAGNOSIS — G89.29 CHRONIC PAIN OF RIGHT ANKLE: Primary | ICD-10-CM

## 2021-04-09 DIAGNOSIS — M25.571 CHRONIC PAIN OF RIGHT ANKLE: Primary | ICD-10-CM

## 2021-04-09 PROCEDURE — 99213 OFFICE O/P EST LOW 20 MIN: CPT | Performed by: PODIATRIST

## 2021-04-09 ASSESSMENT — MIFFLIN-ST. JEOR: SCORE: 1601.46

## 2021-04-09 NOTE — PROGRESS NOTES
ASSESSMENT:  Encounter Diagnosis   Name Primary?     Chronic pain of right ankle Yes     MEDICAL DECISION MAKING:  I explained that her pain is most consistent with peroneal tendon pathology.  However, I also suspect possible lateral ankle impingement syndrome as well as some plantar bursitis.  Her body weight likely contributes.Body mass index is 37.63 kg/m .    At this point I discussed the option of repeat MRI.  Ongoing treatment might involve physical therapy, custom orthoses, and possibly surgical intervention.    She is interested in the MRI and we will formulate a care plan thereafter.    Disclaimer: This note consists of symbols derived from keyboarding, dictation and/or voice recognition software. As a result, there may be errors in the script that have gone undetected. Please consider this when interpreting information found in this chart.    Manish Ramírez DPM, FACFAS, [facility] Department of Podiatry/Foot & Ankle Surgery      ____________________________________________________________________    HPI:       Ana Luisa follows up for chronic right foot pain.  I treated her for this in 2018.  The main source of pain was thought to be the peroneus brevis tendon.  MRI was ordered and was unremarkable.  Initial treatment involve rest, ice, NSAIDs, a Tri-Lock ankle brace as well as a cam walker.  She did not follow-up after a month as requested.  She reports lateral right rear foot pain as well as plantar lateral heel pain.    Today she reports that the intensity of pain can vary.  Overall it has progressed.  Limits her physical activities.  She would like to further evaluate and treat at this time.    *  Past Medical History:   Diagnosis Date     Benign essential tremor      Cervicalgia      Chronic rhinitis      Eczema      Esophageal Reflux/ Schatzke's ring/ PPI long term 7/17/2008     Fibromyalgia syndrome      Former smoker      GERD with esophagitis      Hemochromatosis hereditary     tested positive for  heterozygote     Knee pain      Leg edema      Obesity      Oropharyngeal dysphagia      Palpitations      Peripheral venous insufficiency      Psoriasis      Pulmonary nodules 09/2018    per chest CT-In Care Everywhere     Reflux esophagitis      Restless legs syndrome      Sjogren's disease (H)      SOB (shortness of breath)      SVT (supraventricular tachycardia) (H)    *  *  Past Surgical History:   Procedure Laterality Date     ENDOSCOPY  2006, 2008    dysphagia     UPPER GI ENDOSCOPY  2/2013    GERD/ Maricruz's ring     UPPER GI ENDOSCOPY  04/13/2017    Schatzkies ring   *  *  Current Outpatient Medications   Medication Sig Dispense Refill     augmented betamethasone dipropionate (DIPROLENE-AF) 0.05 % external cream        BENADRYL DECONGESTANT 25-60 MG OR TABS 1 TABLET EVERY 6 HOURS AS NEEDED       clobetasol (TEMOVATE) 0.05 % cream Apply sparingly to affected area twice daily as needed.  Do not apply to face. 30 g 0     doxycycline monohydrate (MONODOX) 100 MG capsule        fluocinonide (LIDEX) 0.05 % external cream        fluticasone (FLONASE) 50 MCG/ACT nasal spray USE 1-2 SPRAYS INTO BOTH NOSTRILS ONCE DAILY 16 g 11     gabapentin (NEURONTIN) 300 MG capsule Take 1 capsule (300 mg) by mouth in afternoon, 1 in evening (300 mg), and 2 capsules (600 mg) at bedtime. 360 capsule 0     gabapentin (NEURONTIN) 300 MG capsule TAKE ONE CAPUSULE BY MOUTH EVERY MORNING AND TWO CAPSULES DAILY BEFORE BED. (Patient taking differently: Takes 4 tablets at bedtime) 270 capsule 3     hydrocortisone 2.5 % lotion        IBUPROFEN 200 MG OR TABS 1 TABLET EVERY 4 TO 6 HOURS AS NEEDED       MULTIVITAMIN OR None Entered       omeprazole (PRILOSEC) 20 MG DR capsule Take 1 capsule (20 mg) by mouth daily 90 capsule 1     polyethylene glycol 0.4%- propylene glycol 0.3% (SYSTANE ULTRA) 0.4-0.3 % SOLN ophthalmic solution Apply 1 drop to eye as needed       TUMS 500 MG OR CHEW 1 TABLET 3 TIMES DAILY PRN       zolpidem (AMBIEN) 5 MG  "tablet TAKE ONE TABLET BY MOUTH EVERY NIGHT AT BEDTIME AS NEEDED FOR SLEEP 30 tablet 1         EXAM:    Vitals: /75   Ht 1.651 m (5' 5\")   Wt 102.6 kg (226 lb 1.6 oz)   LMP 07/29/2013   BMI 37.63 kg/m    BMI: Body mass index is 37.63 kg/m .    Vascular:  Pedal pulses are palpable bilaterally for both the DP and PT arteries.  CFT < 3 sec.  No edema.  Pedal hair growth noted.      Neuro:  Alert and oriented x 3. Coordinated gait.  Light touch sensation is intact to the L4, L5, S1 distributions. No obvious deficits.  No evidence of neurological-based weakness, spasticity, or contracture in the lower extremities.      Derm: Normal texture and turgor.  No erythema, ecchymosis, or cyanosis.  No open lesions.   There is apparent subcutaneous fluid along the lateral right foot near the sinus tarsi and distal.     Musculoskeletal:    Lower extremity muscle strength is normal.  Patient is ambulatory without an assistive device or brace.  Decrease in medial longitudinal arch with weight bearing.  Pain on palpation: Along the course of the right peroneus brevis tendon from the lateral malleolus to the insertion.  Mild pain with eversion oft he foot against resistance.       "

## 2021-04-09 NOTE — LETTER
4/9/2021         RE: Ana Luisa Rao  [address]  [facility] [address]        Dear Colleague,    Thank you for referring your patient, Ana Luisa Rao, to the [facility]. Please see a copy of my visit note below.    ASSESSMENT:  Encounter Diagnosis   Name Primary?     Chronic pain of right ankle Yes     MEDICAL DECISION MAKING:  I explained that her pain is most consistent with peroneal tendon pathology.  However, I also suspect possible lateral ankle impingement syndrome as well as some plantar bursitis.  Her body weight likely contributes.Body mass index is 37.63 kg/m .    At this point I discussed the option of repeat MRI.  Ongoing treatment might involve physical therapy, custom orthoses, and possibly surgical intervention.    She is interested in the MRI and we will formulate a care plan thereafter.    Disclaimer: This note consists of symbols derived from keyboarding, dictation and/or voice recognition software. As a result, there may be errors in the script that have gone undetected. Please consider this when interpreting information found in this chart.    Manish Ramírez DPM, FACFAS, [facility] Department of Podiatry/Foot & Ankle Surgery      ____________________________________________________________________    HPI:       Ana Luisa follows up for chronic right foot pain.  I treated her for this in 2018.  The main source of pain was thought to be the peroneus brevis tendon.  MRI was ordered and was unremarkable.  Initial treatment involve rest, ice, NSAIDs, a Tri-Lock ankle brace as well as a cam walker.  She did not follow-up after a month as requested.  She reports lateral right rear foot pain as well as plantar lateral heel pain.    Today she reports that the intensity of pain can vary.  Overall it has progressed.  Limits her physical activities.  She would like to further evaluate and treat at this time.    *  Past Medical History:   Diagnosis Date     Benign  essential tremor      Cervicalgia      Chronic rhinitis      Eczema      Esophageal Reflux/ Schatzke's ring/ PPI long term 7/17/2008     Fibromyalgia syndrome      Former smoker      GERD with esophagitis      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     Knee pain      Leg edema      Obesity      Oropharyngeal dysphagia      Palpitations      Peripheral venous insufficiency      Psoriasis      Pulmonary nodules 09/2018    per chest CT-In Care Everywhere     Reflux esophagitis      Restless legs syndrome      Sjogren's disease (H)      SOB (shortness of breath)      SVT (supraventricular tachycardia) (H)    *  *  Past Surgical History:   Procedure Laterality Date     ENDOSCOPY  2006, 2008    dysphagia     UPPER GI ENDOSCOPY  2/2013    GERD/ Schatzke's ring     UPPER GI ENDOSCOPY  04/13/2017    Schatzkies ring   *  *  Current Outpatient Medications   Medication Sig Dispense Refill     augmented betamethasone dipropionate (DIPROLENE-AF) 0.05 % external cream        BENADRYL DECONGESTANT 25-60 MG OR TABS 1 TABLET EVERY 6 HOURS AS NEEDED       clobetasol (TEMOVATE) 0.05 % cream Apply sparingly to affected area twice daily as needed.  Do not apply to face. 30 g 0     doxycycline monohydrate (MONODOX) 100 MG capsule        fluocinonide (LIDEX) 0.05 % external cream        fluticasone (FLONASE) 50 MCG/ACT nasal spray USE 1-2 SPRAYS INTO BOTH NOSTRILS ONCE DAILY 16 g 11     gabapentin (NEURONTIN) 300 MG capsule Take 1 capsule (300 mg) by mouth in afternoon, 1 in evening (300 mg), and 2 capsules (600 mg) at bedtime. 360 capsule 0     gabapentin (NEURONTIN) 300 MG capsule TAKE ONE CAPUSULE BY MOUTH EVERY MORNING AND TWO CAPSULES DAILY BEFORE BED. (Patient taking differently: Takes 4 tablets at bedtime) 270 capsule 3     hydrocortisone 2.5 % lotion        IBUPROFEN 200 MG OR TABS 1 TABLET EVERY 4 TO 6 HOURS AS NEEDED       MULTIVITAMIN OR None Entered       omeprazole (PRILOSEC) 20 MG DR capsule Take 1  "capsule (20 mg) by mouth daily 90 capsule 1     polyethylene glycol 0.4%- propylene glycol 0.3% (SYSTANE ULTRA) 0.4-0.3 % SOLN ophthalmic solution Apply 1 drop to eye as needed       TUMS 500 MG OR CHEW 1 TABLET 3 TIMES DAILY PRN       zolpidem (AMBIEN) 5 MG tablet TAKE ONE TABLET BY MOUTH EVERY NIGHT AT BEDTIME AS NEEDED FOR SLEEP 30 tablet 1         EXAM:    Vitals: /75   Ht 1.651 m (5' 5\")   Wt 102.6 kg (226 lb 1.6 oz)   LMP 07/29/2013   BMI 37.63 kg/m    BMI: Body mass index is 37.63 kg/m .    Vascular:  Pedal pulses are palpable bilaterally for both the DP and PT arteries.  CFT < 3 sec.  No edema.  Pedal hair growth noted.      Neuro:  Alert and oriented x 3. Coordinated gait.  Light touch sensation is intact to the L4, L5, S1 distributions. No obvious deficits.  No evidence of neurological-based weakness, spasticity, or contracture in the lower extremities.      Derm: Normal texture and turgor.  No erythema, ecchymosis, or cyanosis.  No open lesions.   There is apparent subcutaneous fluid along the lateral right foot near the sinus tarsi and distal.     Musculoskeletal:    Lower extremity muscle strength is normal.  Patient is ambulatory without an assistive device or brace.  Decrease in medial longitudinal arch with weight bearing.  Pain on palpation: Along the course of the right peroneus brevis tendon from the lateral malleolus to the insertion.  Mild pain with eversion oft he foot against resistance.           Again, thank you for allowing me to participate in the care of your patient.        Sincerely,        Manish Ramírez DPM    "

## 2021-04-09 NOTE — PATIENT INSTRUCTIONS
Advanced imaging is done by appointment. Some insurance companies may require a prior authorization to be completed which can delay the time until you are able to schedule your appointment.   Please call [place] and [place]: [phone] to schedule your MRI.  Depending on your availability you can usually schedule within the next 1-2 days.  If you are active on [employer], you may have access to your test results before your provider is able to review the study and advise on next steps.

## 2021-04-19 ENCOUNTER — HOSPITAL ENCOUNTER (OUTPATIENT)
Dept: MRI IMAGING | Facility: CLINIC | Age: 61
Discharge: HOME | End: 2021-04-19
Attending: PHYSICAL MEDICINE & REHABILITATION | Admitting: PHYSICAL MEDICINE & REHABILITATION
Payer: COMMERCIAL

## 2021-04-19 DIAGNOSIS — M47.812 CERVICAL SPONDYLOSIS WITHOUT MYELOPATHY: ICD-10-CM

## 2021-04-19 DIAGNOSIS — M54.50 CHRONIC BILATERAL LOW BACK PAIN WITHOUT SCIATICA: ICD-10-CM

## 2021-04-19 DIAGNOSIS — M54.12 CERVICAL RADICULITIS: ICD-10-CM

## 2021-04-19 DIAGNOSIS — G89.29 CHRONIC BILATERAL LOW BACK PAIN WITHOUT SCIATICA: ICD-10-CM

## 2021-04-19 PROCEDURE — 72148 MRI LUMBAR SPINE W/O DYE: CPT

## 2021-04-19 PROCEDURE — 72141 MRI NECK SPINE W/O DYE: CPT

## 2021-04-20 DIAGNOSIS — M79.2 NEUROPATHIC PAIN SYNDROME (NON-HERPETIC): ICD-10-CM

## 2021-04-20 RX ORDER — GABAPENTIN 300 MG/1
CAPSULE ORAL
Qty: 360 CAPSULE | Refills: 0 | OUTPATIENT
Start: 2021-04-20

## 2021-04-20 NOTE — TELEPHONE ENCOUNTER
Received refill request for gabapentin. Last filled on 8/26/2020 from [facility] for 6 months.  Our clinic has not seen her for Neuropathic pain syndrome in over 2 years? She goes to a pain clinic.  [facility], how would you like me to proceed?      Pending Prescriptions:                       Disp   Refills    gabapentin (NEURONTIN) 300 MG capsule     360 ca*0            Sig: Take 1 capsule (300 mg) by mouth in afternoon, 1           in evening (300 mg), and 2 capsules (600 mg) at           bedtime.

## 2021-04-23 ENCOUNTER — TELEPHONE (OUTPATIENT)
Dept: FAMILY MEDICINE | Facility: CLINIC | Age: 61
End: 2021-04-23

## 2021-04-23 ENCOUNTER — TELEPHONE (OUTPATIENT)
Dept: PALLIATIVE MEDICINE | Facility: CLINIC | Age: 61
End: 2021-04-23

## 2021-04-23 ENCOUNTER — MYC MEDICAL ADVICE (OUTPATIENT)
Dept: PALLIATIVE MEDICINE | Facility: CLINIC | Age: 61
End: 2021-04-23

## 2021-04-23 DIAGNOSIS — G89.29 CHRONIC BILATERAL LOW BACK PAIN WITHOUT SCIATICA: ICD-10-CM

## 2021-04-23 DIAGNOSIS — G89.29 CHRONIC LEFT-SIDED LOW BACK PAIN WITH LEFT-SIDED SCIATICA: ICD-10-CM

## 2021-04-23 DIAGNOSIS — M54.42 CHRONIC LEFT-SIDED LOW BACK PAIN WITH LEFT-SIDED SCIATICA: ICD-10-CM

## 2021-04-23 DIAGNOSIS — M54.50 CHRONIC BILATERAL LOW BACK PAIN WITHOUT SCIATICA: ICD-10-CM

## 2021-04-23 DIAGNOSIS — G56.03 BILATERAL CARPAL TUNNEL SYNDROME: Primary | ICD-10-CM

## 2021-04-23 NOTE — TELEPHONE ENCOUNTER
Pt calling on MRI results and what is her plan of care.    Routing to provider to review    Latoya Brian RN  Care Coordinator  [facility] Pain Management

## 2021-04-23 NOTE — TELEPHONE ENCOUNTER
Called patient to review results. At this time she states her neck pain has calmed down a bit. She would be interested in trying trigger point injections if neck becomes aggravated again. Will hold off for now. She can call if she wants to schedule these in the future. In terms of arm pain the description of her symptoms sound more like carpal tunnel syndrome. She does report having a hx of CTS and has tried several injections in the past without benefit. Surgery was mentioned to her in the past. I will put in a referral for a hand surgeon to evaluate for this. She also reports having numbness in her left leg which is aggravated with sitting. Will have her start with PT. Referral placed.     Raquel Kahn MD  [facility]

## 2021-04-23 NOTE — TELEPHONE ENCOUNTER
I left a message for Treasure.  The MRI is not urgent, does not have to be done on Monday. I have not had time to call and try to get the prior authorization.l I will not have time today.  I will be out of the office next week.  We can try to address this when I'm back.     Dr. Ramírez

## 2021-04-23 NOTE — TELEPHONE ENCOUNTER
[facility] Financial [facility], Treasure calling about pt's appt for the MRI Ankle Right w/o Contrast   scheduled on Monday.   Insurance denied MRI, prior auth has been submitted,     Is MRI  urgent or can appt be postponed?    Please call Treasure, [phone]  Ok to leave detailed VM.

## 2021-04-23 NOTE — TELEPHONE ENCOUNTER
Reason for call:  Other   Patient called regarding (reason for call): call back  Additional comments: Pt calling to discuss the options and her thoughts after talking with Dr. Kahn regarding the MRI results    Phone number to reach patient:  Cell number on file:    Telephone Information:   Mobile [phone]   Mobile [phone]       Best Time:  anytime    Can we leave a detailed message on this number?  YES    Travel screening: Not Applicable     Kathy WALL    [facility]

## 2021-04-26 NOTE — TELEPHONE ENCOUNTER
rankur message from patient on 04/23/2021 at 1714:  Hi Dr. Kahn. I learned today that my [facility] insurance denied the lumbar portion of that MRI I had on Monday. They only approved the cervical portion, although the referral to see you was for leg/back pain. When I called to inquire they were unable to tell me the reason for the lumbar denial or what information they still needed. They said you needed to call them at [phone]. Sorry, but could you please do that for me?  Thank you!  _____________________________________________    [employer] message sent to patient:  Good Morning Ana Luisa,    We will try to submit more information to your insurance company to see if we can get the MRI approved. Once we hear back from the insurance company, we will reach out to you.    Take Care,    Latoya Brian RN  Care Coordinator  [facility] Pain Management   ______________________________________________    Routing to S Lance to submit office note to insurance to see if we can get the MRI covered.

## 2021-04-26 NOTE — TELEPHONE ENCOUNTER
"Will leave encounter open for patient response/chart review by nursing.     Melissa Orosco,     I had someone look into the MRI denial as we do not handle the authorizations, that is done through the imaging department as they do the actual imaging. But what I found out was that your insurance company denied the claim because they wanted more information. They requested a \"peer to peer\" phone call where Dr Kahn would discuss directly with a provider at your insurance company the reasoning why you had this MRI. Unfortunately, it sounds like your insurance did deny it again after the peer to peer review. You should have the option to appeal directly to your insurance but you would need to look into what forms/the process is for that with them. I am sorry that we were not able to push the appeal through with the peer to peer discussion.        Carolyn FUNEZ, RN Care Coordinator  [facility]  Pain Management  "

## 2021-04-26 NOTE — TELEPHONE ENCOUNTER
[facility] [employer] called with claim # [id number], this is needed to provide to [employer]. They have denied this request. Per message below the peer to peer was already done and a message was sent to patient.       [facility] # [phone]    Prudence MONACO    [place] [facility]

## 2021-04-26 NOTE — TELEPHONE ENCOUNTER
Left message for [facility] asking for a return call to let us know if there is anything we need to do further for the PA.     JONATHAN Mondragon RN

## 2021-04-28 NOTE — TELEPHONE ENCOUNTER
Left 2nd message asking for a return call from [facility].   In checking chart review, it appears Dr. Ramírez needs to do a peer to peer review when he returns.     JONATHAN Mondragon RN

## 2021-04-29 ENCOUNTER — OFFICE VISIT (OUTPATIENT)
Dept: ORTHOPEDICS | Facility: CLINIC | Age: 61
End: 2021-04-29
Attending: PHYSICAL MEDICINE & REHABILITATION
Payer: COMMERCIAL

## 2021-04-29 VITALS
HEIGHT: 65 IN | DIASTOLIC BLOOD PRESSURE: 72 MMHG | SYSTOLIC BLOOD PRESSURE: 114 MMHG | WEIGHT: 226 LBS | BODY MASS INDEX: 37.65 KG/M2

## 2021-04-29 DIAGNOSIS — M50.30 DEGENERATION OF CERVICAL INTERVERTEBRAL DISC: Primary | ICD-10-CM

## 2021-04-29 DIAGNOSIS — G56.03 BILATERAL CARPAL TUNNEL SYNDROME: ICD-10-CM

## 2021-04-29 PROCEDURE — 99203 OFFICE O/P NEW LOW 30 MIN: CPT | Performed by: ORTHOPAEDIC SURGERY

## 2021-04-29 ASSESSMENT — MIFFLIN-ST. JEOR: SCORE: 1601.01

## 2021-04-29 NOTE — PROGRESS NOTES
"HISTORY OF PRESENT ILLNESS:    Ana Luisa Rao is a 61 year old female who is seen in consultation at the request of Dr. Kahn for bilateral arm.  Patient reports onset of symptoms years ago, with first cortisone injection 2017.  Patient reports increased symptoms in the right > left.     Present symptoms: Right distal index finger tip pain and hypersensitivity, nighttime paresthesia. She notes tossing and turning due to numbness and tingling of bilateral forearms, wrist and hand.  She reports initial weakness of bilateral hands, opening doors and lifting/ carrying boxes.  Patient reports EMG study performed in 2016 with worsening symptoms since this study.  Treatments tried to this point: previous cortisone injections with Dr. Saad Flores most recent 10/2018, nighttime bracing- intermittently for years.   Orthopedic PMH: Cervicalgia, cervical fusion at C6-C7    Past Medical History:   Diagnosis Date     Benign essential tremor      Cervicalgia      Chronic rhinitis      Eczema      Esophageal Reflux/ Schatzke's ring/ PPI long term 7/17/2008     Fibromyalgia syndrome      Former smoker      GERD with esophagitis      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     Knee pain      Leg edema      Obesity      Oropharyngeal dysphagia      Palpitations      Peripheral venous insufficiency      Psoriasis      Pulmonary nodules 09/2018    per chest CT-In Care Everywhere     Reflux esophagitis      Restless legs syndrome      Sjogren's disease (H)      SOB (shortness of breath)      SVT (supraventricular tachycardia) (H)        Past Surgical History:   Procedure Laterality Date     ENDOSCOPY  2006, 2008    dysphagia     UPPER GI ENDOSCOPY  2/2013    GERD/ Schatzke's ring     UPPER GI ENDOSCOPY  04/13/2017    Schatzkies ring       Family History   Problem Relation Age of Onset     Connective Tissue Disorder Mother         \"vasculitis\"     Hypertension Father      Cancer - colorectal Maternal Uncle         " 73     Blood Disease Brother         hemocrhomatosis     Breast Cancer No family hx of      Diabetes No family hx of        Social History     Socioeconomic History     Marital status:      Spouse name: Not on file     Number of children: Not on file     Years of education: Not on file     Highest education level: Not on file   Occupational History     Occupation:      Employer: [employer]   Social Needs     Financial resource strain: Not hard at all     Food insecurity     Worry: Never true     Inability: Never true     Transportation needs     Medical: No     Non-medical: No   Tobacco Use     Smoking status: Former Smoker     Packs/day: 0.50     Years: 10.00     Pack years: 5.00     Types: Cigarettes     Start date: 1984     Quit date: 1/1/2006     Years since quitting: 15.3     Smokeless tobacco: Never Used     Tobacco comment: quit 2006   Substance and Sexual Activity     Alcohol use: Yes     Alcohol/week: 0.8 standard drinks     Types: 1 Standard drinks or equivalent per week     Comment: once a month      Drug use: No     Sexual activity: Yes     Partners: Male     Comment: vas   Lifestyle     Physical activity     Days per week: Not on file     Minutes per session: Not on file     Stress: Not on file   Relationships     Social connections     Talks on phone: Not on file     Gets together: Not on file     Attends Mormonism service: Not on file     Active member of club or organization: Not on file     Attends meetings of clubs or organizations: Not on file     Relationship status: Not on file     Intimate partner violence     Fear of current or ex partner: Not on file     Emotionally abused: Not on file     Physically abused: Not on file     Forced sexual activity: Not on file   Other Topics Concern      Service Not Asked     Blood Transfusions Not Asked     Caffeine Concern Not Asked     Occupational Exposure Not Asked     Hobby Hazards Not Asked     Sleep Concern Not Asked     Stress  Concern Not Asked     Weight Concern Not Asked     Special Diet Not Asked     Back Care Not Asked     Exercise No     Comment: intermittently     Bike Helmet Not Asked     Seat Belt Yes     Self-Exams Not Asked     Parent/sibling w/ CABG, MI or angioplasty before 65F 55M? Not Asked   Social History Narrative     Not on file       Current Outpatient Medications   Medication Sig Dispense Refill     augmented betamethasone dipropionate (DIPROLENE-AF) 0.05 % external cream        BENADRYL DECONGESTANT 25-60 MG OR TABS 1 TABLET EVERY 6 HOURS AS NEEDED       clobetasol (TEMOVATE) 0.05 % cream Apply sparingly to affected area twice daily as needed.  Do not apply to face. 30 g 0     doxycycline monohydrate (MONODOX) 100 MG capsule        fluocinonide (LIDEX) 0.05 % external cream        fluticasone (FLONASE) 50 MCG/ACT nasal spray USE 1-2 SPRAYS INTO BOTH NOSTRILS ONCE DAILY 16 g 11     gabapentin (NEURONTIN) 300 MG capsule Take 1 capsule (300 mg) by mouth in afternoon, 1 in evening (300 mg), and 2 capsules (600 mg) at bedtime. 360 capsule 0     gabapentin (NEURONTIN) 300 MG capsule TAKE ONE CAPUSULE BY MOUTH EVERY MORNING AND TWO CAPSULES DAILY BEFORE BED. (Patient taking differently: Takes 4 tablets at bedtime) 270 capsule 3     hydrocortisone 2.5 % lotion        IBUPROFEN 200 MG OR TABS 1 TABLET EVERY 4 TO 6 HOURS AS NEEDED       MULTIVITAMIN OR None Entered       omeprazole (PRILOSEC) 20 MG DR capsule Take 1 capsule (20 mg) by mouth daily 90 capsule 1     polyethylene glycol 0.4%- propylene glycol 0.3% (SYSTANE ULTRA) 0.4-0.3 % SOLN ophthalmic solution Apply 1 drop to eye as needed       TUMS 500 MG OR CHEW 1 TABLET 3 TIMES DAILY PRN       zolpidem (AMBIEN) 5 MG tablet TAKE ONE TABLET BY MOUTH EVERY NIGHT AT BEDTIME AS NEEDED FOR SLEEP 30 tablet 1       Allergies   Allergen Reactions     Penicillins        REVIEW OF SYSTEMS:  CONSTITUTIONAL:  NEGATIVE for fever, chills, change in weight  INTEGUMENTARY/SKIN:   "eczema  EYES:  NEGATIVE for vision changes or irritation  ENT/MOUTH:  NEGATIVE for ear, mouth and throat problems  RESP:  NEGATIVE for significant cough or SOB  BREAST:  NEGATIVE for masses, tenderness or discharge  CV:  NEGATIVE for chest pain, palpitations or peripheral edema  GI:  Reflux  :  Negative   MUSCULOSKELETAL:  See HPI above  NEURO:  paresthesias  ENDOCRINE:  NEGATIVE for temperature intolerance, skin/hair changes  HEME/ALLERGY/IMMUNE:  NEGATIVE for bleeding problems  PSYCHIATRIC:  NEGATIVE for changes in mood or affect      PHYSICAL EXAM:  /72 (BP Location: Right arm, Patient Position: Chair, Cuff Size: Adult Large)   Ht 1.651 m (5' 5\")   Wt 102.5 kg (226 lb)   LMP 07/29/2013   BMI 37.61 kg/m    Body mass index is 37.61 kg/m .   GENERAL APPEARANCE: healthy, alert and no distress   HEENT: No apparent thyroid megaly. Clear sclera with normal ocular movement  RESPIRATORY: No labored breathing  SKIN: no suspicious lesions or rashes  NEURO: Normal strength and tone, mentation intact and speech normal  VASCULAR: Good pulses, and capillary refill   LYMPH: no lymphadenopathy   PSYCH:  mentation appears normal and affect normal/bright    MUSCULOSKELETAL:  Not in acute distress  Normal gait    Full range of motion of the neck  Full range of motion of shoulders, bilateral  Full range of motion of the elbows, bilateral  Full motor strength grossly throughout both upper extremities    Wrist range of motion is full  Finger range of motion is full  Decreased sensation in the thumb, index and long, bilateral compared to the ring and little fingers   and pinching strength is intact  Abduction and adduction strength is intact    Negative Tinel's sign, bilateral  Positive Phalen test on the right    Minimal bilateral thenar eminence atrophy  Circulation is intact  Skin is intact in both hands     ASSESSMENT:  Chronic bilateral carpal tunnel syndrome  C5-6 foraminal stenosis of severe degree    PLAN:  We " went over the details of carpal tunnel syndrome in terms of pathoanatomy and the treatment options.  She has tried bracing and previous cord injection none of which have been helpful.  With ongoing progressive worsening symptoms, at this point surgical intervention was felt to be very reasonable.    However given the fact that she does have concomitant cervical issues especially at the C5-6 with a severe foraminal stenosis, some of the symptoms may not go away completely.  She understands that in most cases taking 1 problem of the plate can be helpful changing the situation of double crushing syndrome to one focal problem.    Related to the surgery, we talked about potential risks of infection, persisting symptoms and recurrence.  We also talked about the choices for anesthesia namely, local versus local MAC.  She feels comfortable doing the surgery under local alone.    At this point, right carpal tunnel release under local anesthesia will be scheduled according to her convenience.    Imaging Interpretation:     Cervical MRI performed on 4/19/21    IMPRESSION:  CERVICAL SPINE MRI:  1.  Severe bilateral C5-C6 neural foraminal stenoses.  2.  Solid C6-C7 interbody fusion.  3.  No high-grade spinal canal stenosis. No abnormal cord signal.      Dami Leigh MD  Department of Orthopedic Surgery        Disclaimer: This note consists of symbols derived from keyboarding, dictation and/or voice recognition software. As a result, there may be errors in the script that have gone undetected. Please consider this when interpreting information found in this chart.

## 2021-04-29 NOTE — LETTER
4/29/2021         RE: Ana Luisa Rao  [address]  [place] [facility] [place] [address]        Dear Colleague,    Thank you for referring your patient, Ana Luisa Rao, to the [facility] [place]. Please see a copy of my visit note below.    HISTORY OF PRESENT ILLNESS:    Ana Luisa Rao is a 61 year old female who is seen in consultation at the request of Dr. Kahn for bilateral arm.  Patient reports onset of symptoms years ago, with first cortisone injection 2017.  Patient reports increased symptoms in the right > left.     Present symptoms: Right distal index finger tip pain and hypersensitivity, nighttime paresthesia. She notes tossing and turning due to numbness and tingling of bilateral forearms, wrist and hand.  She reports initial weakness of bilateral hands, opening doors and lifting/ carrying boxes.  Patient reports EMG study performed in 2016 with worsening symptoms since this study.  Treatments tried to this point: previous cortisone injections with Dr. Saad Flores most recent 10/2018, nighttime bracing- intermittently for years.   Orthopedic PMH: Cervicalgia, cervical fusion at C6-C7    Past Medical History:   Diagnosis Date     Benign essential tremor      Cervicalgia      Chronic rhinitis      Eczema      Esophageal Reflux/ Schatzke's ring/ PPI long term 7/17/2008     Fibromyalgia syndrome      Former smoker      GERD with esophagitis      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     Knee pain      Leg edema      Obesity      Oropharyngeal dysphagia      Palpitations      Peripheral venous insufficiency      Psoriasis      Pulmonary nodules 09/2018    per chest CT-In Care Everywhere     Reflux esophagitis      Restless legs syndrome      Sjogren's disease (H)      SOB (shortness of breath)      SVT (supraventricular tachycardia) (H)        Past Surgical History:   Procedure Laterality Date     ENDOSCOPY  2006, 2008    dysphagia     UPPER GI ENDOSCOPY   "2/2013    GERD/ Maricruz's ring     UPPER GI ENDOSCOPY  04/13/2017    Schatzkies ring       Family History   Problem Relation Age of Onset     Connective Tissue Disorder Mother         \"vasculitis\"     Hypertension Father      Cancer - colorectal Maternal Uncle         73     Blood Disease Brother         hemocrhomatosis     Breast Cancer No family hx of      Diabetes No family hx of        Social History     Socioeconomic History     Marital status:      Spouse name: Not on file     Number of children: Not on file     Years of education: Not on file     Highest education level: Not on file   Occupational History     Occupation:      Employer: [employer]   [employer]     Financial resource strain: Not hard at all     Food insecurity     Worry: Never true     Inability: Never true     Transportation needs     Medical: No     Non-medical: No   Tobacco Use     Smoking status: Former Smoker     Packs/day: 0.50     Years: 10.00     Pack years: 5.00     Types: Cigarettes     Start date: 1984     Quit date: 1/1/2006     Years since quitting: 15.3     Smokeless tobacco: Never Used     Tobacco comment: quit 2006   Substance and Sexual Activity     Alcohol use: Yes     Alcohol/week: 0.8 standard drinks     Types: 1 Standard drinks or equivalent per week     Comment: once a month      Drug use: No     Sexual activity: Yes     Partners: Male     Comment: vas   Lifestyle     Physical activity     Days per week: Not on file     Minutes per session: Not on file     Stress: Not on file   Relationships     Social connections     Talks on phone: Not on file     Gets together: Not on file     Attends Hoahaoism service: Not on file     Active member of club or organization: Not on file     Attends meetings of clubs or organizations: Not on file     Relationship status: Not on file     Intimate partner violence     Fear of current or ex partner: Not on file     Emotionally abused: Not on file     Physically abused: Not " on file     Forced sexual activity: Not on file   Other Topics Concern      Service Not Asked     Blood Transfusions Not Asked     Caffeine Concern Not Asked     Occupational Exposure Not Asked     Hobby Hazards Not Asked     Sleep Concern Not Asked     Stress Concern Not Asked     Weight Concern Not Asked     Special Diet Not Asked     Back Care Not Asked     Exercise No     Comment: intermittently     Bike Helmet Not Asked     Seat Belt Yes     Self-Exams Not Asked     Parent/sibling w/ CABG, MI or angioplasty before 65F 55M? Not Asked   Social History Narrative     Not on file       Current Outpatient Medications   Medication Sig Dispense Refill     augmented betamethasone dipropionate (DIPROLENE-AF) 0.05 % external cream        BENADRYL DECONGESTANT 25-60 MG OR TABS 1 TABLET EVERY 6 HOURS AS NEEDED       clobetasol (TEMOVATE) 0.05 % cream Apply sparingly to affected area twice daily as needed.  Do not apply to face. 30 g 0     doxycycline monohydrate (MONODOX) 100 MG capsule        fluocinonide (LIDEX) 0.05 % external cream        fluticasone (FLONASE) 50 MCG/ACT nasal spray USE 1-2 SPRAYS INTO BOTH NOSTRILS ONCE DAILY 16 g 11     gabapentin (NEURONTIN) 300 MG capsule Take 1 capsule (300 mg) by mouth in afternoon, 1 in evening (300 mg), and 2 capsules (600 mg) at bedtime. 360 capsule 0     gabapentin (NEURONTIN) 300 MG capsule TAKE ONE CAPUSULE BY MOUTH EVERY MORNING AND TWO CAPSULES DAILY BEFORE BED. (Patient taking differently: Takes 4 tablets at bedtime) 270 capsule 3     hydrocortisone 2.5 % lotion        IBUPROFEN 200 MG OR TABS 1 TABLET EVERY 4 TO 6 HOURS AS NEEDED       MULTIVITAMIN OR None Entered       omeprazole (PRILOSEC) 20 MG DR capsule Take 1 capsule (20 mg) by mouth daily 90 capsule 1     polyethylene glycol 0.4%- propylene glycol 0.3% (SYSTANE ULTRA) 0.4-0.3 % SOLN ophthalmic solution Apply 1 drop to eye as needed       TUMS 500 MG OR CHEW 1 TABLET 3 TIMES DAILY PRN       zolpidem  "(AMBIEN) 5 MG tablet TAKE ONE TABLET BY MOUTH EVERY NIGHT AT BEDTIME AS NEEDED FOR SLEEP 30 tablet 1       Allergies   Allergen Reactions     Penicillins        REVIEW OF SYSTEMS:  CONSTITUTIONAL:  NEGATIVE for fever, chills, change in weight  INTEGUMENTARY/SKIN:  eczema  EYES:  NEGATIVE for vision changes or irritation  ENT/MOUTH:  NEGATIVE for ear, mouth and throat problems  RESP:  NEGATIVE for significant cough or SOB  BREAST:  NEGATIVE for masses, tenderness or discharge  CV:  NEGATIVE for chest pain, palpitations or peripheral edema  GI:  Reflux  :  Negative   MUSCULOSKELETAL:  See HPI above  NEURO:  paresthesias  ENDOCRINE:  NEGATIVE for temperature intolerance, skin/hair changes  HEME/ALLERGY/IMMUNE:  NEGATIVE for bleeding problems  PSYCHIATRIC:  NEGATIVE for changes in mood or affect      PHYSICAL EXAM:  /72 (BP Location: Right arm, Patient Position: Chair, Cuff Size: Adult Large)   Ht 1.651 m (5' 5\")   Wt 102.5 kg (226 lb)   LMP 07/29/2013   BMI 37.61 kg/m    Body mass index is 37.61 kg/m .   GENERAL APPEARANCE: healthy, alert and no distress   HEENT: No apparent thyroid megaly. Clear sclera with normal ocular movement  RESPIRATORY: No labored breathing  SKIN: no suspicious lesions or rashes  NEURO: Normal strength and tone, mentation intact and speech normal  VASCULAR: Good pulses, and capillary refill   LYMPH: no lymphadenopathy   PSYCH:  mentation appears normal and affect normal/bright    MUSCULOSKELETAL:  Not in acute distress  Normal gait    Full range of motion of the neck  Full range of motion of shoulders, bilateral  Full range of motion of the elbows, bilateral  Full motor strength grossly throughout both upper extremities    Wrist range of motion is full  Finger range of motion is full  Decreased sensation in the thumb, index and long, bilateral compared to the ring and little fingers   and pinching strength is intact  Abduction and adduction strength is intact    Negative Tinel's " sign, bilateral  Positive Phalen test on the right    Minimal bilateral thenar eminence atrophy  Circulation is intact  Skin is intact in both hands     ASSESSMENT:  Chronic bilateral carpal tunnel syndrome  C5-6 foraminal stenosis of severe degree    PLAN:  We went over the details of carpal tunnel syndrome in terms of pathoanatomy and the treatment options.  She has tried bracing and previous cord injection none of which have been helpful.  With ongoing progressive worsening symptoms, at this point surgical intervention was felt to be very reasonable.    However given the fact that she does have concomitant cervical issues especially at the C5-6 with a severe foraminal stenosis, some of the symptoms may not go away completely.  She understands that in most cases taking 1 problem of the plate can be helpful changing the situation of double crushing syndrome to one focal problem.    Related to the surgery, we talked about potential risks of infection, persisting symptoms and recurrence.  We also talked about the choices for anesthesia namely, local versus local MAC.  She feels comfortable doing the surgery under local alone.    At this point, right carpal tunnel release under local anesthesia will be scheduled according to her convenience.    Imaging Interpretation:     Cervical MRI performed on 4/19/21    IMPRESSION:  CERVICAL SPINE MRI:  1.  Severe bilateral C5-C6 neural foraminal stenoses.  2.  Solid C6-C7 interbody fusion.  3.  No high-grade spinal canal stenosis. No abnormal cord signal.      Dami Leigh MD  Department of Orthopedic Surgery        Disclaimer: This note consists of symbols derived from keyboarding, dictation and/or voice recognition software. As a result, there may be errors in the script that have gone undetected. Please consider this when interpreting information found in this chart.        Again, thank you for allowing me to participate in the care of your patient.        Sincerely,        Dami  Joe Leigh MD

## 2021-05-04 NOTE — TELEPHONE ENCOUNTER
[employer] message read on 04/26/2021 at 1235 pm. Closing encounter.    Latoya Brian RN  Care Coordinator  [facility]

## 2021-05-17 ENCOUNTER — MYC MEDICAL ADVICE (OUTPATIENT)
Dept: PALLIATIVE MEDICINE | Facility: CLINIC | Age: 61
End: 2021-05-17

## 2021-05-17 NOTE — TELEPHONE ENCOUNTER
"Routing to provider to review, unsure what was discussed in peer to peer.    Per OV 04/02/21:  1. Chronic low back pain: Pain in the low back seems to be more axial in nature. No clear radicular symptoms. She does have pain and paresthesias in the lower legs in a non-dermatomal pattern. Cannot exclude possibility of lumbar stenosis contributing to her symptoms. She has no recent imaging of the lumbar spine. Last MRI was in 2018.     Ana Luisa Rao is a 61 year old female who presents for initial evaluation of chief pain complaint of back and leg pain. Back pain has been intermittent over the past 3 years or so. It was insidious in onset. Located in bilateral low back. Pain is intermittent. Aggravated with standing for at least 10 minutes. It is sharp in quality. No spasms. Doesn't feel muscular. She was seen at [facility] last November for this and an MRI was ordered. It was not completed because it was not covered by insurance. She also endorsed having lower leg pain and numbness. She saw vascular for this. Symptoms have been ongoing for at least 15 years. ELVA was done which was normal. Symptoms are aggravated with squatting and she has some irritation with standing.    She reports sometimes finds herself catching her toes when walking. Doesn't remmber which foot caused the issue in the past.     ---------------mychart below from pt------------  Insurance denial update,  re lumbar MRI -   Sorry but Mychart will not allow me to reply to the previous care coordinator's message regarding my insurance deniual/appeal....         I did send in an appeal today but want to make you aware that the denial letter that I received from [employer] states this as the reason for the denial:    \"This test can help when a physical exam by your doctor shows signs of spinal cord damage. These signs could include muscle weakness or paralysis. Your doctor did not tell us that you had signs of spinal cord damage on exam\".     My symptoms that we " talked about in my visit (in addition to neck and back pain) were severe leg numbness when sitting, including a couple falls after getting off the toilet due to inability to feel my leg. I also mentioned how in a couple circumstances it was difficult to lift my leg up to get over a door threshold.      We did also discuss some other shin pain and tenderness that you thought was unrelated to this other pain and numbness/spine.      I would just like to make sure these symptoms are documented in my chart in case the insurance company requests another consult or documentation during the appeal process.

## 2021-05-18 ENCOUNTER — HOSPITAL ENCOUNTER (OUTPATIENT)
Dept: MRI IMAGING | Facility: CLINIC | Age: 61
Discharge: HOME | End: 2021-05-18
Attending: PODIATRIST | Admitting: PODIATRIST
Payer: COMMERCIAL

## 2021-05-18 DIAGNOSIS — G89.29 CHRONIC PAIN OF RIGHT ANKLE: ICD-10-CM

## 2021-05-18 DIAGNOSIS — M25.571 CHRONIC PAIN OF RIGHT ANKLE: ICD-10-CM

## 2021-05-18 PROCEDURE — 73721 MRI JNT OF LWR EXTRE W/O DYE: CPT | Mod: 26 | Performed by: RADIOLOGY

## 2021-05-18 PROCEDURE — 73721 MRI JNT OF LWR EXTRE W/O DYE: CPT | Mod: RT

## 2021-05-19 NOTE — TELEPHONE ENCOUNTER
"Yes, the symptoms are documented in the note. On physical exam there were no objective findings. Neuro exam was normal. Based on their criteria there were no objective signs of \"spinal cord damage\". Documenting subjective symptoms was not enough to get imaging covered.     Raquel Kahn MD  [facility]   "

## 2021-05-20 NOTE — TELEPHONE ENCOUNTER
"[facility] message sent to patient:   Good Morning Dr Criss Orosco did review your message.  She stated yes, the symptoms are documented in the note. On physical exam there were no objective findings. Neuro exam was normal. Based on their criteria there were no objective signs of \"spinal cord damage\". Documenting subjective symptoms was not enough to get imaging covered. I think you are actually able to view your office notes on my chart. If you are unable to do that, please call our medical records department and they can get you a copy of it. Their number is [phone].  I hope this information is helpful for you.    Take Care,    Latoya Brian RN  Care Coordinator  [facility]   "

## 2021-05-24 NOTE — TELEPHONE ENCOUNTER
nilda message reviewed 05/20/2021 at 0830 am. Closing encounter    Latoya Brian RN  Care Coordinator  [facility]

## 2021-05-27 ENCOUNTER — TELEPHONE (OUTPATIENT)
Dept: ORTHOPEDICS | Facility: CLINIC | Age: 61
End: 2021-05-27

## 2021-05-27 ENCOUNTER — TELEPHONE (OUTPATIENT)
Dept: FAMILY MEDICINE | Facility: CLINIC | Age: 61
End: 2021-05-27

## 2021-05-27 ENCOUNTER — DOCUMENTATION ONLY (OUTPATIENT)
Dept: PODIATRY | Facility: CLINIC | Age: 61
End: 2021-05-27

## 2021-05-27 NOTE — TELEPHONE ENCOUNTER
Fax from [facility] asking for ALL imaging to be sent to DICOM format on a disc or to our office electronically through the [employer] system    Please mail the disc of images to:  [facility]  [facility]  [address] [place], [place] [id number] [phone] -- phone  [phone] -- fax    I talked with this request. We are not referring patient to [facility]. Patient needs to fill out a KOFI so we can fax/mail imaging reports to [facility]. Patient will need to call the locations where she had the imaging done to have the disc sent.  -- Patient will get our KOFI of the web site. Will fax or drop off if needed.     I also let patient know that we use Epic. [facility] can see records via Care Everywhere. Patient was aware of this.     Patients [facility] ID # [id number]

## 2021-05-27 NOTE — TELEPHONE ENCOUNTER
Attempt to reach patient to schedule surgery for carpal tunnel.  Left message for patient to call the surgery scheduling line at [phone].     Krista Tomlinson, Surgery Scheduler

## 2021-05-27 NOTE — PROGRESS NOTES
Letter received for approval for MRI considered medically necessary 04/21/2021-05/20/2021. Sent to abstracting.     Josy SHAW CMA

## 2021-06-18 ENCOUNTER — OFFICE VISIT (OUTPATIENT)
Dept: PEDIATRICS | Facility: CLINIC | Age: 61
End: 2021-06-18
Payer: COMMERCIAL

## 2021-06-18 VITALS
RESPIRATION RATE: 16 BRPM | SYSTOLIC BLOOD PRESSURE: 130 MMHG | DIASTOLIC BLOOD PRESSURE: 78 MMHG | HEART RATE: 81 BPM | WEIGHT: 223 LBS | HEIGHT: 65 IN | BODY MASS INDEX: 37.15 KG/M2 | OXYGEN SATURATION: 99 % | TEMPERATURE: 97.2 F

## 2021-06-18 DIAGNOSIS — R53.83 FATIGUE, UNSPECIFIED TYPE: ICD-10-CM

## 2021-06-18 DIAGNOSIS — M79.604 LEG PAIN, BILATERAL: Primary | ICD-10-CM

## 2021-06-18 DIAGNOSIS — E55.9 VITAMIN D DEFICIENCY: ICD-10-CM

## 2021-06-18 DIAGNOSIS — M79.2 NEUROPATHIC PAIN SYNDROME (NON-HERPETIC): ICD-10-CM

## 2021-06-18 DIAGNOSIS — M51.369 LUMBAR DEGENERATIVE DISC DISEASE: ICD-10-CM

## 2021-06-18 DIAGNOSIS — M79.7 FIBROMYALGIA: ICD-10-CM

## 2021-06-18 DIAGNOSIS — Z13.1 SCREENING FOR DIABETES MELLITUS: ICD-10-CM

## 2021-06-18 DIAGNOSIS — M79.605 LEG PAIN, BILATERAL: Primary | ICD-10-CM

## 2021-06-18 DIAGNOSIS — M25.50 ARTHRALGIA, UNSPECIFIED JOINT: ICD-10-CM

## 2021-06-18 DIAGNOSIS — Z13.220 SCREENING FOR LIPID DISORDERS: ICD-10-CM

## 2021-06-18 LAB
ALBUMIN SERPL-MCNC: 4 G/DL (ref 3.4–5)
ALP SERPL-CCNC: 105 U/L (ref 40–150)
ALT SERPL W P-5'-P-CCNC: 34 U/L (ref 0–50)
ANION GAP SERPL CALCULATED.3IONS-SCNC: 4 MMOL/L (ref 3–14)
AST SERPL W P-5'-P-CCNC: 22 U/L (ref 0–45)
BILIRUB SERPL-MCNC: 0.4 MG/DL (ref 0.2–1.3)
BUN SERPL-MCNC: 16 MG/DL (ref 7–30)
CALCIUM SERPL-MCNC: 9 MG/DL (ref 8.5–10.1)
CHLORIDE SERPL-SCNC: 106 MMOL/L (ref 94–109)
CHOLEST SERPL-MCNC: 222 MG/DL
CO2 SERPL-SCNC: 27 MMOL/L (ref 20–32)
CREAT SERPL-MCNC: 0.62 MG/DL (ref 0.52–1.04)
CRP SERPL-MCNC: <2.9 MG/L (ref 0–8)
ERYTHROCYTE [DISTWIDTH] IN BLOOD BY AUTOMATED COUNT: 12.1 % (ref 10–15)
ERYTHROCYTE [SEDIMENTATION RATE] IN BLOOD BY WESTERGREN METHOD: 10 MM/H (ref 0–30)
GFR SERPL CREATININE-BSD FRML MDRD: >90 ML/MIN/{1.73_M2}
GLUCOSE SERPL-MCNC: 88 MG/DL (ref 70–99)
HCT VFR BLD AUTO: 42.9 % (ref 35–47)
HDLC SERPL-MCNC: 48 MG/DL
HGB BLD-MCNC: 13.8 G/DL (ref 11.7–15.7)
LDLC SERPL CALC-MCNC: 150 MG/DL
MCH RBC QN AUTO: 29.7 PG (ref 26.5–33)
MCHC RBC AUTO-ENTMCNC: 32.2 G/DL (ref 31.5–36.5)
MCV RBC AUTO: 93 FL (ref 78–100)
NONHDLC SERPL-MCNC: 174 MG/DL
PLATELET # BLD AUTO: 264 10E9/L (ref 150–450)
POTASSIUM SERPL-SCNC: 4 MMOL/L (ref 3.4–5.3)
PROT SERPL-MCNC: 8 G/DL (ref 6.8–8.8)
RBC # BLD AUTO: 4.64 10E12/L (ref 3.8–5.2)
SODIUM SERPL-SCNC: 137 MMOL/L (ref 133–144)
TRIGL SERPL-MCNC: 120 MG/DL
TSH SERPL DL<=0.005 MIU/L-ACNC: 2.97 MU/L (ref 0.4–4)
WBC # BLD AUTO: 7.1 10E9/L (ref 4–11)

## 2021-06-18 PROCEDURE — 85027 COMPLETE CBC AUTOMATED: CPT | Performed by: INTERNAL MEDICINE

## 2021-06-18 PROCEDURE — 86140 C-REACTIVE PROTEIN: CPT | Performed by: INTERNAL MEDICINE

## 2021-06-18 PROCEDURE — 80053 COMPREHEN METABOLIC PANEL: CPT | Performed by: INTERNAL MEDICINE

## 2021-06-18 PROCEDURE — 85652 RBC SED RATE AUTOMATED: CPT | Performed by: INTERNAL MEDICINE

## 2021-06-18 PROCEDURE — 80061 LIPID PANEL: CPT | Performed by: INTERNAL MEDICINE

## 2021-06-18 PROCEDURE — 36415 COLL VENOUS BLD VENIPUNCTURE: CPT | Performed by: INTERNAL MEDICINE

## 2021-06-18 PROCEDURE — 99215 OFFICE O/P EST HI 40 MIN: CPT | Performed by: INTERNAL MEDICINE

## 2021-06-18 PROCEDURE — 82306 VITAMIN D 25 HYDROXY: CPT | Performed by: INTERNAL MEDICINE

## 2021-06-18 PROCEDURE — 84443 ASSAY THYROID STIM HORMONE: CPT | Performed by: INTERNAL MEDICINE

## 2021-06-18 RX ORDER — NAPROXEN 500 MG/1
500 TABLET ORAL 2 TIMES DAILY WITH MEALS
Qty: 60 TABLET | Refills: 2 | Status: SHIPPED | OUTPATIENT
Start: 2021-06-18 | End: 2021-11-23

## 2021-06-18 RX ORDER — GABAPENTIN 400 MG/1
CAPSULE ORAL
Qty: 120 CAPSULE | Refills: 2 | Status: SHIPPED | OUTPATIENT
Start: 2021-06-18 | End: 2021-11-23

## 2021-06-18 ASSESSMENT — MIFFLIN-ST. JEOR: SCORE: 1587.4

## 2021-06-18 NOTE — PROGRESS NOTES
"    Assessment & Plan     Leg pain, bilateral  Discussed likely multifactorial with lumbar disease at least part of the issue.  MRI results and potential benefit of procedure.  Pain clinic referral for procedure. Start gabapentin per orders.    Fibromyalgia  Gabapentin per orders.      Lumbar degenerative disc disease  Gabapentin per orders and injection  - PAIN MANAGEMENT REFERRAL; Future    Fatigue, unspecified type  Check for cause.  Can be due to fibromyalgia  - TSH with free T4 reflex    Arthralgia, unspecified joint  Check labs for evidence of underlying cause beyond fibromyalgia.  Increase gabapentin  - Comprehensive metabolic panel  - CBC with platelets  - Erythrocyte sedimentation rate auto  - CRP inflammation    Vitamin D deficiency  Recheck as deficiency can contribute to pain  - Vitamin D Deficiency    Neuropathic pain syndrome (non-herpetic)  Medication per orders  - gabapentin (NEURONTIN) 400 MG capsule; Take 1 capsule (400 mg) by mouth 2 times daily AND 2 capsules (800 mg) every evening.  - naproxen (NAPROSYN) 500 MG tablet; Take 1 tablet (500 mg) by mouth 2 times daily (with meals)    Screening for lipid disorders    - Lipid panel reflex to direct LDL Non-fasting    Screening for diabetes mellitus  Check labs per orders        I spent a total of 66 minutes on the day of the visit.   Time spent doing chart review, history and exam, documentation and further activities per the note       BMI:   Estimated body mass index is 37.11 kg/m  as calculated from the following:    Height as of this encounter: 1.651 m (5' 5\").    Weight as of this encounter: 101.2 kg (223 lb).       See Patient Instructions    Return in about 2 months (around 8/18/2021) for Physical Exam.    Gabriela Saenz MD  [facility] ZAHRA Orosco is a 61 year old who presents for the following health issues     HPI     Re-Establish care-    Musculoskeletal problem/pain  Onset/Duration: few years " "  Description  Location: leg - bilateral  Joint Swelling: YES  Redness: no  Pain: YES  Warmth: no  Intensity:  moderate, severe  Progression of Symptoms:  worsening  Accompanying signs and symptoms:   Fevers: no  Numbness/tingling/weakness: YES  History  Trauma to the area: no  Recent illness:  no  Previous similar problem: YES  Previous evaluation:  YES - vascular doctor and evaluation normal. Ortho without cause.  Fluid lebron below knees.   Saw podiatry for ankle pain.  Saw pain clinic and MRI with IMPRESSION:  CERVICAL SPINE MRI:  1.  Severe bilateral C5-C6 neural foraminal stenoses.  2.  Solid C6-C7 interbody fusion.  3.  No high-grade spinal canal stenosis. No abnormal cord signal.     LUMBAR SPINE MRI:  1.  L4-L5 degenerative change with mild right and mild to moderate  left neural foraminal stenoses.  2.  Stenosis on the left is in part due to a small disc protrusion.    Left leg goes numb when sitting.  Has fallen twice when going to stand after sitting.    Pain is worse throughout day.  Is not on gabapentin as running out and trying to conserve    Precipitating or alleviating factors:  Aggravating factors include: overuse  Therapies tried and outcome: nothing    Rash on foot that is somewhat better, follows with dermatology    Not sleeping well, rarely uses ambien    Pain - takes ibuprofen regularly, 400mg 2-four times daily     Review of Systems   chronic cough and dyspnea.    Constitutional, HEENT, cardiovascular, pulmonary, gi and gu systems are negative, except as otherwise noted.      Objective    /78 (BP Location: Right arm, Patient Position: Chair, Cuff Size: Adult Large)   Pulse 81   Temp 97.2  F (36.2  C) (Tympanic)   Resp 16   Ht 1.651 m (5' 5\")   Wt 101.2 kg (223 lb)   LMP 07/29/2013   SpO2 99%   BMI 37.11 kg/m    Body mass index is 37.11 kg/m .  Physical Exam   GENERAL: healthy, alert and no distress  MS: prominent soft tissue masses lebron legs medial below knees.  LE edema from ankles " up with edema but not feet  SKIN: rt foot with erythema and white scale  PSYCH: mentation appears normal, affect normal/bright

## 2021-06-18 NOTE — PATIENT INSTRUCTIONS
I recommend complaining with the state to see if they can help get your [place] covered: [email or web address]    They will call you to schedule an injection in the low back to see if that will help with the left leg symptoms.    Schedule the carpal tunnel surgery if insurance pays for it.    Start the gabapentin - 400mg morning and afternoon and 800 milligram before bed to help with sleep and pain.

## 2021-06-21 LAB — DEPRECATED CALCIDIOL+CALCIFEROL SERPL-MC: 31 UG/L (ref 20–75)

## 2021-06-22 ENCOUNTER — TELEPHONE (OUTPATIENT)
Dept: PALLIATIVE MEDICINE | Facility: CLINIC | Age: 61
End: 2021-06-22
Payer: COMMERCIAL

## 2021-06-22 NOTE — TELEPHONE ENCOUNTER
Left L4-5 Transforaminal epidural steroid injection.    Raquel Kahn MD  [facility]

## 2021-06-22 NOTE — TELEPHONE ENCOUNTER
My Clinical Question Is:   injection to help with pain in left leg.            Timeframe Requested:   Routine: Next available opening            Priority:   Routine [1]            Reason for Referral:   Procedure Order            Procedure:   Injection to be Determined by Pain Management Specialist

## 2021-06-23 NOTE — TELEPHONE ENCOUNTER
WINNIEM to schedule Left L4-5 Transforaminal epidural steroid injection.    Kathy WALL    [facility]

## 2021-08-12 ENCOUNTER — TELEPHONE (OUTPATIENT)
Dept: PEDIATRICS | Facility: CLINIC | Age: 61
End: 2021-08-12

## 2021-08-12 NOTE — TELEPHONE ENCOUNTER
Due to provider availability, patient's appointment 8/20/21 needs to be rescheduled.  Left message for patient informing her of this.  Requested call back to reschedule.  Provided clinic number.     Helena Sism  Lead

## 2021-08-24 ENCOUNTER — MYC MEDICAL ADVICE (OUTPATIENT)
Dept: PEDIATRICS | Facility: CLINIC | Age: 61
End: 2021-08-24

## 2021-08-24 ENCOUNTER — E-VISIT (OUTPATIENT)
Dept: PEDIATRICS | Facility: CLINIC | Age: 61
End: 2021-08-24
Payer: COMMERCIAL

## 2021-08-24 DIAGNOSIS — N39.0 ACUTE UTI (URINARY TRACT INFECTION): Primary | ICD-10-CM

## 2021-08-24 PROCEDURE — 99421 OL DIG E/M SVC 5-10 MIN: CPT | Performed by: NURSE PRACTITIONER

## 2021-08-24 RX ORDER — NITROFURANTOIN 25; 75 MG/1; MG/1
100 CAPSULE ORAL 2 TIMES DAILY
Qty: 10 CAPSULE | Refills: 0 | Status: SHIPPED | OUTPATIENT
Start: 2021-08-24 | End: 2021-08-29

## 2021-08-24 NOTE — PATIENT INSTRUCTIONS
Dear Ana Luisa Rao    After reviewing your responses, I've been able to diagnose you with a urinary tract infection, which is a common infection of the bladder with bacteria.  This is not a sexually transmitted infection, though urinating immediately after intercourse can help prevent infections.  Drinking lots of fluids is also helpful to clear your current infection and prevent the next one.      I have sent a prescription for antibiotics to your pharmacy to treat this infection.    It is important that you take all of your prescribed medication even if your symptoms are improving after a few doses.  Taking all of your medicine helps prevent the symptoms from returning.     If your symptoms worsen, you develop pain in your back or stomach, develop fevers, or are not improving in 5 days, please contact your primary care provider for an appointment or visit any of our convenient Walk-in or Urgent Care Centers to be seen, which can be found on our website here.    Thanks again for choosing us as your health care partner,    HOLLIE Hernandez CNP

## 2021-09-16 NOTE — TELEPHONE ENCOUNTER
Screening Questions for Radiology Injections:    Injection to be done at which interventional clinic site? [facility]    If [facility] location, tell patient that this procedure requires a COVID-19 lab test be done within 4 days of the procedure. Would you still like to move forward with scheduling the procedure?  Not Applicable   If YES, let patient know that someone will call them to schedule the COVID-19 test and that they will only receive a call back if the result is positive. Route to nursing to enter order.     Instruct patient to arrive as directed prior to the scheduled appointment time:    [place] minutes before      [place]: 30 minutes before; if IV needed 1 hour before     Procedure ordered by Gabriela Saenz    Procedure ordered? Left L4-5 Transforaminal epidural steroid injection.      Transforaminal Cervical ROBERT -  [place] does NOT have providers that do these- [facility] and [facility] do have providers that do    As a reminder, receiving steroids can decrease your body's ability to fight infection.   Would you still like to move forward with scheduling the injection?  Yes    What insurance would patient like us to bill for this procedure? BC      Worker's comp or MVA (motor vehicle accident) -Any injection DO NOT SCHEDULE and route to Prudence Lucas.      [employer] insurance - For SI joint injections, DO NOT SCHEDULE and route Prudence Lucas.       ALL BCBS, [employer] and [employer]-Route to Prudence for review DO NOT SCHEDULE      IF SCHEDULING IN [place] AND NEEDS A PA, IT IS OKAY TO SCHEDULE. [place] HANDLES THEIR OWN PA'S AFTER THE PATIENT IS SCHEDULED. PLEASE SCHEDULE AT LEAST 1 WEEK OUT SO A PA CAN BE OBTAINED.    Any chance of pregnancy? NO   If YES, do NOT schedule and route to RN pool    Is an  needed? No     Patient has a drive home? (mandatory) YES: ok    Is patient taking any blood thinners (i.e. plavix, coumadin, jantoven, warfarin, heparin, pradaxa or  dabigatran, etc)? No   If hold needed, do NOT schedule, route to RN pool     Is patient taking any aspirin products (includes Excedrin and Fiorinal)? No     If more than 325mg/day, OK to schedule; Instruct pt to decrease to less than 325 mg for 7 days AND route to RN pool    For CERVICAL procedures, hold all aspirin products for 6 days.     Tell pt that if aspirin product is not held for 6 days, the procedure WILL BE cancelled.      Does the patient have a bleeding or clotting disorder? No     If YES, okay to schedule AND route to RN nurse pool    For any patients with platelet count <100, must be forwarded to provider    Any allergies to contrast dye, iodine, shellfish, or numbing and steroid medications? No    If YES, add allergy information to appointment notes AND route to the RN pool     If ROBERT and Contrast Dye / Iodine Allergy? DO NOT SCHEDULE, route to RN pool    Allergies: Penicillins     Is patient diabetic?  No  If YES, instruct them to bring their glucometer.    Does patient have an active infection or treated for one within the past week? No     Is patient currently taking any antibiotics?  No     For patients on chronic, preventative, or prophylactic antibiotics, procedures may be scheduled.     For patients on antibiotics for active or recent infection:antibiotic course must have been completed for 4 days    Is patient currently taking any steroid medications? (i.e. Prednisone, Medrol)  No     For patients on steroid medications, course must have been completed for 4 days    Is patient actively being treated for cancer or immunocompromised? No  If YES, do NOT schedule and route to RN pool     Are you able to get on and off an exam table with minimal or no assistance? Yes  If NO, do NOT schedule and route to RN pool    Are you able to roll over and lay on your stomach with minimal or no assistance? Yes  If NO, do NOT schedule and route to RN pool     Has the patient had a flu shot or any other  vaccinations within 7 days before or after the procedure.  No     Have you recently had a COVID vaccine or have plans to get it in the near future? No    If yes, explain that for the vaccine to work best they need to:       wait 1 week before and 1 week after getting Vaccine #1    wait 1 week before and 2 weeks after getting Vaccine #2    If patient has concerns about the timing, send to RN pool     Does patient have an MRI/CT?  YES: MRI  Check Procedure Scheduling Grid to see if required.      Was the MRI done within the last 3 years?  Yes    If yes, where was the MRI done i.e.[facility], [facility], [facility], [facility] etc? [facility]      If no, do not schedule and route to RN pool    If MRI was not done at [facility], [facility] or [facility] do NOT schedule and route to RN pool.      If pt has an imaging disc, the injection MAY be scheduled but pt has to bring disc to appt.     If they show up without the disc the injection cannot be done    Procedure Specific Instructions:      If celiac plexus block, informed patient NPO for 6 hours and that it is okay to take medications with sips of water, especially blood pressure medications  Not Applicable         If this is for a cervical procedure, informed patient that aspirin needs to be held for 6 days.   Not Applicable      If IV needed:    Do not schedule procedures requiring IV placement in the first appointment of the day or first appointment after lunch. Do NOT schedule at 0745, 0815 or 1245. ok    Instructed pt to arrive 30 minutes early for IV start if required. (Check Procedure Scheduling Grid)  Not Applicable    Reminders:      If you are started on any steroids or antibiotics between now and your appointment, you must contact us because the procedure may need to be cancelled.  Yes      For all procedures except radiofrequency ablations (RFAs) and spinal cord stimulator (SCS) trials, informed patient:    IV sedation is not provided for this procedure.   If you feel that an oral anti-anxiety medication is needed, you can discuss this further with your referring provider or primary care provider.  The Pain Clinic provider will discuss specifics of what the procedure includes at your appointment.  Most procedures last 10-20 minutes.  We use numbing medications to help with any discomfort during the procedure.  Not Applicable      For patients 85 or older we recommend having an adult stay w/ them for the remainder of the day.   ok    Does the patient have any questions?  NO  Raiza Campbell  [facility]

## 2021-09-21 NOTE — TELEPHONE ENCOUNTER
PA approved.  Effective date: 09/21/2021 - 10/04/2021  PA reference #: [id number]  Pt. notified:   OKAY TO SCHEDULE WITH DR MARYBETH MONACO    [place] [facility]

## 2021-09-21 NOTE — TELEPHONE ENCOUNTER
LVM to schedule Left L4-5 Transforaminal epidural steroid injection      Bianca KELLY    [facility] [place]

## 2021-10-24 ENCOUNTER — HEALTH MAINTENANCE LETTER (OUTPATIENT)
Age: 61
End: 2021-10-24

## 2021-11-04 ENCOUNTER — TELEPHONE (OUTPATIENT)
Dept: ORTHOPEDICS | Facility: CLINIC | Age: 61
End: 2021-11-04

## 2021-11-04 NOTE — TELEPHONE ENCOUNTER
Scheduled surgery    Patient states she if fully vaccinated. COVID test not required.     Type of surgery: right carpal tunnel  Location of surgery: Other: [facility]  Date and time of surgery: 12/1/21  Surgeon: Lu  Pre-Op Appt Date: local  Post-Op Appt Date: 12/13/21   Packet sent out: Yes  Pre-cert/Authorization completed:  yes, pre-auth not required.  Ref # [id number] & [id number]  Date: 11/4/21      Krista Tomlinson, Surgery Scheduler

## 2021-11-04 NOTE — TELEPHONE ENCOUNTER
Pt called to schedule Left L4-5 Transforaminal epidural steroid injection. Went over pre screening questions. Routing to [place] for BC Approval.      Bianca KELLY    [place] [facility] [place]

## 2021-11-08 NOTE — TELEPHONE ENCOUNTER
Need to separate by 2 weeks at minimum.    Missael Kaufman DO  [facility]

## 2021-11-08 NOTE — TELEPHONE ENCOUNTER
Pt also has order for Bilateral Trochanteric Bursa Injections and wants to know if these can be done the same day.    Routing to review.    Kathy WALL    [facility]

## 2021-11-08 NOTE — TELEPHONE ENCOUNTER
MAYDA HALL DATES- 09/21/2021 - 12/19/2021    Okay to schedule      Prudence MONACO    [place] [facility]

## 2021-11-09 ENCOUNTER — E-VISIT (OUTPATIENT)
Dept: PEDIATRICS | Facility: CLINIC | Age: 61
End: 2021-11-09
Payer: COMMERCIAL

## 2021-11-09 DIAGNOSIS — N39.0 ACUTE UTI (URINARY TRACT INFECTION): Primary | ICD-10-CM

## 2021-11-09 PROCEDURE — 99421 OL DIG E/M SVC 5-10 MIN: CPT | Performed by: INTERNAL MEDICINE

## 2021-11-09 RX ORDER — SULFAMETHOXAZOLE/TRIMETHOPRIM 800-160 MG
1 TABLET ORAL 2 TIMES DAILY
Qty: 6 TABLET | Refills: 0 | Status: SHIPPED | OUTPATIENT
Start: 2021-11-09 | End: 2021-11-12

## 2021-11-09 NOTE — TELEPHONE ENCOUNTER
Provider E-Visit time total (minutes): 5 mins    Last UTI 8/24 - 2.5 months ago.  Last preventive visit 2018

## 2021-11-09 NOTE — PATIENT INSTRUCTIONS
Dear Ana Luisa Rao    After reviewing your responses, I've been able to diagnose you with a urinary tract infection, which is a common infection of the bladder with bacteria.  This is not a sexually transmitted infection, though urinating immediately after intercourse can help prevent infections.  Drinking lots of fluids is also helpful to clear your current infection and prevent the next one.      I have sent a prescription for antibiotics to your pharmacy to treat this infection.    It is important that you take all of your prescribed medication even if your symptoms are improving after a few doses.  Taking all of your medicine helps prevent the symptoms from returning.     If your symptoms worsen, you develop pain in your back or stomach, develop fevers, or are not improving in 5 days, please contact your primary care provider for an appointment or visit any of our convenient Walk-in or Urgent Care Centers to be seen, which can be found on our website here.    Thanks again for choosing us as your health care partner,    Gabriela Saenz MD    Urinary Tract Infections in Women  Urinary tract infections (UTIs) are most often caused by bacteria. These bacteria enter the urinary tract. The bacteria may come from inside the body. Or they may travel from the skin outside the rectum or vagina into the urethra. Female anatomy makes it easy for bacteria from the bowel to enter a woman s urinary tract, which is the most common source of UTI. This means women develop UTIs more often than men. Pain in or around the urinary tract is a common UTI symptom. But the only way to know for sure if you have a UTI for the healthcare provider to test your urine. The two tests that may be done are the urinalysis and urine culture.     Types of UTIs    Cystitis. A bladder infection (cystitis) is the most common UTI in women. You may have urgent or frequent need to pee. You may also have pain, burning when you pee, and bloody  urine.    Urethritis. This is an inflamed urethra, which is the tube that carries urine from the bladder to outside the body. You may have lower stomach or back pain. You may also have urgent or frequent need to pee.    Pyelonephritis. This is a kidney infection. If not treated, it can be serious and damage your kidneys. In severe cases, you may need to stay in the hospital. You may have a fever and lower back pain.    Medicines to treat a UTI  Most UTIs are treated with antibiotics. These kill the bacteria. The length of time you need to take them depends on the type of infection. It may be as short as 3 days. If you have repeated UTIs, you may need a low-dose antibiotic for several months. Take antibiotics exactly as directed. Don t stop taking them until all of the medicine is gone. If you stop taking the antibiotic too soon, the infection may not go away. You may also develop a resistance to the antibiotic. This can make it much harder to treat.   Lifestyle changes to treat and prevent UTIs   The lifestyle changes below will help get rid of your UTI. They may also help prevent future UTIs.     Drink plenty of fluids. This includes water, juice, or other caffeine-free drinks. Fluids help flush bacteria out of your body.    Empty your bladder. Always empty your bladder when you feel the urge to pee. And always pee before going to sleep. Urine that stays in your bladder can lead to infection. Try to pee before and after sex as well.    Practice good personal hygiene. Wipe yourself from front to back after using the toilet. This helps keep bacteria from getting into the urethra.    Use condoms during sex. These help prevent UTIs caused by sexually transmitted bacteria. Also don't use spermicides during sex. These can increase the risk for UTIs. Choose other forms of birth control instead. For women who tend to get UTIs after sex, a low-dose of a preventive antibiotic may be used. Be sure to discuss this option with  your healthcare provider.    Follow up with your healthcare provider as directed. He or she may test to make sure the infection has cleared. If needed, more treatment may be started.  Nikolas last reviewed this educational content on 7/1/2019 2000-2021 [employer]. All rights reserved. This information is not intended as a substitute for professional medical care. Always follow your healthcare professional's instructions.

## 2021-11-10 ENCOUNTER — TELEPHONE (OUTPATIENT)
Dept: ORTHOPEDICS | Facility: CLINIC | Age: 61
End: 2021-11-10
Payer: COMMERCIAL

## 2021-11-10 NOTE — TELEPHONE ENCOUNTER
Please see note below.   Patient is scheduled for ROBERT on 11/24/21 through pain management. She asks if this would effect surgery scheduled for right carpal tunnel release on 12/1/21.     Please advise if ok to continue with ROBERT as scheduled and no contraindication for surgery.     JONATHAN Mondragon RN

## 2021-11-10 NOTE — TELEPHONE ENCOUNTER
This PA is provider specific, routing to reschedule with Dr Porter MONACO    [place] [facility]

## 2021-11-10 NOTE — TELEPHONE ENCOUNTER
Pt rescheduled 11/24 with Porter and 12/8.    Kathy WALL    [facility]

## 2021-11-10 NOTE — TELEPHONE ENCOUNTER
Reason for call:  Message from Ana Luisa regarding schedule of steroid injection for her back (11/24), 1 week before surgery (12/1). Asks if this is ok.    Please call back.    Phone number to reach patient:  Cell number on file:    Telephone Information:   Mobile [phone]   Mobile [phone]       Best Time:  -    Can we leave a detailed message on this number?  YES

## 2021-11-19 ENCOUNTER — TELEPHONE (OUTPATIENT)
Dept: PALLIATIVE MEDICINE | Facility: CLINIC | Age: 61
End: 2021-11-19
Payer: COMMERCIAL

## 2021-11-19 NOTE — PROGRESS NOTES
[facility] - Procedure Note    Date of Service: 11/24/2021    Procedure performed: LEFT L4-5 transforaminal epidural steroid injection with fluoroscopic guidance  Diagnosis: Lumbar spondylosis; Lumbar radiculitis/radiculopathy  : Dominique Buenrostro MD   Anesthesia: none    Indications: Ana Luisa Rao is a 61 year old female who is seen at the request of Dr. Gabriela Saenz for lumbar transforaminal epidural steroid injection. The patient describes low back pain with radiation to the left lateral thigh and calf. The patient has been exhibiting symptoms consistent with lumbar intraspinal inflammation and radiculopathy. Symptoms have been persistent, disabling, and intermittently severe. The patient reports minimal improvement with conservative treatment, including PT and medications.    Lumbar MRI was done on 4/19/2021 which showed:   LUMBAR SPINE MRI:  There are five lumbar-type vertebrae for the purposes of this  dictation.      Normal vertebral body heights, alignment and marrow signal. The conus  tip is identified at the lower half of L2. Subtle filum lipoma.  Visualized extraspinal soft tissues are within normal limits.     T12-L1: Normal disc height and signal.  No herniation.  Normal facets.   No spinal canal or neural foraminal stenosis.     L1-L2: Normal disc height and signal.  No herniation.  Normal facets.   No spinal canal or neural foraminal stenosis.     L2-L3: Normal disc height and signal.  No herniation. Mild bilateral  facet arthropathy.  No spinal canal or neural foraminal stenosis.     L3-L4: Mild disc height and T2 signal loss. Mild posterior annular  bulge. Mild bilateral facet arthropathy.  No spinal canal or neural  foraminal stenosis.     L4-L5: Normal disc height. Mild disc T2 signal loss. Mild  circumferential disc osteophyte complex. Superimposed small left  foraminal disc protrusion. Mild bilateral facet arthropathy. No spinal  canal stenosis. Mild right  neural foraminal stenosis. Mild-to-moderate  left neural foraminal stenosis.     L5-S1: Normal disc height and signal. Minimal posterior annular bulge.  Mile to moderate bilateral facet arthropathy.  No spinal canal or  right neural foraminal stenosis. Mild left neural foraminal stenosis.    LUMBAR SPINE MRI:  1.  L4-L5 degenerative change with mild right and mild to moderate  left neural foraminal stenoses.  2.  Stenosis on the left is in part due to a small disc protrusion.    Allergies:      Allergies   Allergen Reactions     Penicillins         Vitals:  BP (!) 142/85   Pulse 77   LMP 07/29/2013   SpO2 99%     Review of Systems: The patient denies recent fever, chills, illness, use of antibiotics or anticoagulants. All other 10-point review of systems negative.     Procedure: The procedure and risks were explained, and informed written consent was obtained from the patient. Risks include but are not limited to: infection, bleeding, increased pain, and damage to soft tissue, nerve, muscle, and vasculature structures. After getting informed consent, patient was brought into the procedure suite and was placed in a prone position on the procedure table. A Pause for the Cause was performed. Patient was prepped and draped in sterile fashion.     After identifying the left L4-5 neuroforamen, the C-arm was rotated to a left lateral oblique angle.  A total of 4.5 mL of Lidocaine 1% was used to anesthetize the skin and the needle track at a skin entry site coaxial with the fluoroscopy beam, and overriding the superior aspect of the neuroforamen.  A 22 gauge 5 inch spinal needle was advanced under intermittent fluoroscopy until it entered the foramen superiorly.    The position was then inspected from anteroposterior and lateral views, and the needle adjusted appropriately.  After negative aspiration, a total of 1 mL of Omnipaque-300 was injected using static and continuous fluoroscopy confirming appropriate position,  with spread along the nerve root sheath and into the epidural space, with no intravascular or intrathecal uptake. 9 mL of Omnipaque-300 was wasted.    1mL of 1% lidocaine with 20 mg of dexamethasone was injected.  The needle was removed. Hemostasis was achieved, the area was cleaned, and bandaids were placed when appropriate. Images were saved to PACS.    The patient tolerated the procedure well, and was taken to the recovery room, and there was no evidence of procedural complications. No new sensory or motor deficits were noted following the procedure. The patient was stable and able to ambulate on discharge home. Post-procedure instructions were provided.     Pre-procedure pain score: 1/10 in the back, 1/10 in the leg  Post-procedure pain score: 1/10 in the back, 1/10 in the leg    Assessment/Plan: Ana Luisa Rao is a 61 year old female s/p LEFT L4-5 transforaminal epidural steroid injection today for lumbar spondylosis, radiculitis/radiculopathy.     1. Following today's procedure, the patient was advised to contact the Pain Management Center for any of the following:   Fever, chills, or night sweats   New onset of pain, numbness, or weakness   Any questions/concerns regarding the procedure  If unable to contact the Pain Center, the patient was instructed to go to a local Emergency Room for any complications.   2. The patient will receive a follow-up call in 1 week.  3. The patient should follow-up with the referring provider in 2 weeks for post-procedure evaluation.      ALY RUEDA MD   Pain Management & Addiction Medicine

## 2021-11-19 NOTE — TELEPHONE ENCOUNTER
Phoned pt to inform she has an injection scheduled on 11/24 w/Porter and a Bilateral Trochanteric Bursa Injections  Scheduled 12/8 w/Shae in clinic.      Bianca KELLY    [facility] [place]

## 2021-11-19 NOTE — TELEPHONE ENCOUNTER
This patient thinks she is getting a lumbar ROBERT and bilateral trochanteric bursa injections done on 11/24/2021.  I am not sure who told her that but please call her and let her know that there needs to be 2 weeks between these. Its way too much steroid to do in one day.       Her bilateral trochanteric bursa injections can be scheduled in the clinic.  I do not need the xray for these.     Dominique Buenrostro MD

## 2021-11-22 NOTE — TELEPHONE ENCOUNTER
Voice message left on 11/19/2021 at 4:09 PM    Reason for Call:  Other returning call    Detailed comments: Per voice message - I just missed a call from you/got a message from you, requesting that I call you back but you are closed now.      Phone Number Patient can be reached at: Cell number on file:    Telephone Information:   Mobile [phone]           Best Time: not indicated    Can we leave a detailed message on this number? not indicated    Voice message retrieved on 11/22/2021 at 8:00 AM by Deysi Phelps

## 2021-11-23 ENCOUNTER — OFFICE VISIT (OUTPATIENT)
Dept: PEDIATRICS | Facility: CLINIC | Age: 61
End: 2021-11-23
Payer: COMMERCIAL

## 2021-11-23 ENCOUNTER — TELEPHONE (OUTPATIENT)
Dept: VASCULAR SURGERY | Facility: CLINIC | Age: 61
End: 2021-11-23

## 2021-11-23 VITALS
HEART RATE: 90 BPM | TEMPERATURE: 97.8 F | SYSTOLIC BLOOD PRESSURE: 128 MMHG | DIASTOLIC BLOOD PRESSURE: 82 MMHG | WEIGHT: 233.9 LBS | BODY MASS INDEX: 38.97 KG/M2 | HEIGHT: 65 IN | OXYGEN SATURATION: 99 % | RESPIRATION RATE: 14 BRPM

## 2021-11-23 DIAGNOSIS — R03.0 ELEVATED BP WITHOUT DIAGNOSIS OF HYPERTENSION: ICD-10-CM

## 2021-11-23 DIAGNOSIS — M79.604 PAIN IN BOTH LOWER EXTREMITIES: ICD-10-CM

## 2021-11-23 DIAGNOSIS — Z12.11 SCREEN FOR COLON CANCER: ICD-10-CM

## 2021-11-23 DIAGNOSIS — M79.2 NEUROPATHIC PAIN SYNDROME (NON-HERPETIC): ICD-10-CM

## 2021-11-23 DIAGNOSIS — Z00.00 ROUTINE GENERAL MEDICAL EXAMINATION AT A HEALTH CARE FACILITY: Primary | ICD-10-CM

## 2021-11-23 DIAGNOSIS — M79.605 PAIN IN BOTH LOWER EXTREMITIES: ICD-10-CM

## 2021-11-23 DIAGNOSIS — E66.01 MORBID OBESITY (H): ICD-10-CM

## 2021-11-23 DIAGNOSIS — G61.9 INFLAMMATORY AND TOXIC NEUROPATHY (H): ICD-10-CM

## 2021-11-23 DIAGNOSIS — I87.2 VENOUS (PERIPHERAL) INSUFFICIENCY: ICD-10-CM

## 2021-11-23 DIAGNOSIS — M79.7 FIBROMYALGIA: ICD-10-CM

## 2021-11-23 DIAGNOSIS — K22.2 SCHATZKI'S RING: ICD-10-CM

## 2021-11-23 DIAGNOSIS — G62.2 INFLAMMATORY AND TOXIC NEUROPATHY (H): ICD-10-CM

## 2021-11-23 DIAGNOSIS — I47.10 SUPRAVENTRICULAR TACHYCARDIA (H): ICD-10-CM

## 2021-11-23 PROBLEM — G25.0 BENIGN ESSENTIAL TREMOR: Status: ACTIVE | Noted: 2018-04-05

## 2021-11-23 PROBLEM — R89.8 ABNORMAL GENETIC TEST: Status: ACTIVE | Noted: 2021-11-23

## 2021-11-23 PROBLEM — M54.16 LUMBAR RADICULOPATHY: Status: ACTIVE | Noted: 2018-06-28

## 2021-11-23 PROCEDURE — 99396 PREV VISIT EST AGE 40-64: CPT | Performed by: INTERNAL MEDICINE

## 2021-11-23 PROCEDURE — 99214 OFFICE O/P EST MOD 30 MIN: CPT | Mod: 25 | Performed by: INTERNAL MEDICINE

## 2021-11-23 RX ORDER — AMITRIPTYLINE HYDROCHLORIDE 10 MG/1
TABLET ORAL
Qty: 90 TABLET | Refills: 2 | Status: SHIPPED | OUTPATIENT
Start: 2021-11-23 | End: 2022-12-16

## 2021-11-23 RX ORDER — PREGABALIN 50 MG/1
50 CAPSULE ORAL 3 TIMES DAILY
Qty: 90 CAPSULE | Refills: 1 | Status: SHIPPED | OUTPATIENT
Start: 2021-11-23 | End: 2022-05-05

## 2021-11-23 SDOH — HEALTH STABILITY: PHYSICAL HEALTH
ON AVERAGE, HOW MANY DAYS PER WEEK DO YOU ENGAGE IN MODERATE TO STRENUOUS EXERCISE (LIKE A BRISK WALK)?: PATIENT DECLINED

## 2021-11-23 SDOH — ECONOMIC STABILITY: INCOME INSECURITY: IN THE LAST 12 MONTHS, WAS THERE A TIME WHEN YOU WERE NOT ABLE TO PAY THE MORTGAGE OR RENT ON TIME?: NO

## 2021-11-23 SDOH — HEALTH STABILITY: PHYSICAL HEALTH: ON AVERAGE, HOW MANY MINUTES DO YOU ENGAGE IN EXERCISE AT THIS LEVEL?: 10 MIN

## 2021-11-23 SDOH — ECONOMIC STABILITY: INCOME INSECURITY: HOW HARD IS IT FOR YOU TO PAY FOR THE VERY BASICS LIKE FOOD, HOUSING, MEDICAL CARE, AND HEATING?: SOMEWHAT HARD

## 2021-11-23 SDOH — ECONOMIC STABILITY: FOOD INSECURITY: WITHIN THE PAST 12 MONTHS, THE FOOD YOU BOUGHT JUST DIDN'T LAST AND YOU DIDN'T HAVE MONEY TO GET MORE.: NEVER TRUE

## 2021-11-23 SDOH — ECONOMIC STABILITY: FOOD INSECURITY: WITHIN THE PAST 12 MONTHS, YOU WORRIED THAT YOUR FOOD WOULD RUN OUT BEFORE YOU GOT MONEY TO BUY MORE.: NEVER TRUE

## 2021-11-23 ASSESSMENT — SOCIAL DETERMINANTS OF HEALTH (SDOH)
IN A TYPICAL WEEK, HOW MANY TIMES DO YOU TALK ON THE PHONE WITH FAMILY, FRIENDS, OR NEIGHBORS?: MORE THAN THREE TIMES A WEEK
DO YOU BELONG TO ANY CLUBS OR ORGANIZATIONS SUCH AS CHURCH GROUPS UNIONS, FRATERNAL OR ATHLETIC GROUPS, OR SCHOOL GROUPS?: NO
HOW OFTEN DO YOU ATTEND CHURCH OR RELIGIOUS SERVICES?: 1 TO 4 TIMES PER YEAR
HOW OFTEN DO YOU GET TOGETHER WITH FRIENDS OR RELATIVES?: MORE THAN THREE TIMES A WEEK

## 2021-11-23 ASSESSMENT — ENCOUNTER SYMPTOMS
SORE THROAT: 0
NERVOUS/ANXIOUS: 0
CHILLS: 0
JOINT SWELLING: 0
PALPITATIONS: 1
BREAST MASS: 1
NAUSEA: 1
HEADACHES: 0
MYALGIAS: 1
EYE PAIN: 0
HEARTBURN: 0
SHORTNESS OF BREATH: 1
PARESTHESIAS: 0
HEMATOCHEZIA: 0
WEAKNESS: 0
DIZZINESS: 0
ARTHRALGIAS: 1
DYSURIA: 0
FEVER: 0
HEMATURIA: 0
CONSTIPATION: 0
COUGH: 0
ABDOMINAL PAIN: 0
FREQUENCY: 0
DIARRHEA: 0

## 2021-11-23 ASSESSMENT — MIFFLIN-ST. JEOR: SCORE: 1632.87

## 2021-11-23 ASSESSMENT — LIFESTYLE VARIABLES
HOW MANY STANDARD DRINKS CONTAINING ALCOHOL DO YOU HAVE ON A TYPICAL DAY: 1 OR 2
HOW OFTEN DO YOU HAVE A DRINK CONTAINING ALCOHOL: MONTHLY OR LESS
HOW OFTEN DO YOU HAVE SIX OR MORE DRINKS ON ONE OCCASION: NEVER

## 2021-11-23 NOTE — PROGRESS NOTES
SUBJECTIVE:   CC: Ana Luisa Rao is an 61 year old woman who presents for preventive health visit.     Patient has been advised of split billing requirements and indicates understanding: Yes  Healthy Habits:     Getting at least 3 servings of Calcium per day:  Yes    Bi-annual eye exam:  Yes    Dental care twice a year:  Yes    Sleep apnea or symptoms of sleep apnea:  Daytime drowsiness    Diet:  Regular (no restrictions)    Frequency of exercise:  1 day/week    Duration of exercise:  Other    Taking medications regularly:  Yes    Medication side effects:  Other    PHQ-2 Total Score: 0    Additional concerns today:  Yes  has a pool and swims a lot in the winter    Concerns today:   leg pain - bilateral - swelling and pain below knees.  Swelling has not gotten worse but pain has.  Wondering about vascular insufficiency and lipidema.  Can feel a vein pain that is different from the other.  Can't pull pants legs over it as it hurts so bad.  Can't tolerate compression hose.  Not taking full amount of gabapentin- gets side effects with heart racing.  Naprosyn didn't help - takes ibuprofen as needed     Back pain - having injection tomorrow.    Right foot hurt yrs since sprained it.  Hard to walk and do other things    Psoriasis - occasionally using creams when very itching.  Hasn't seen derm for awhile        Today's PHQ-2 Score:   PHQ-2 ( 1999 [employer]) 11/23/2021   Q1: Little interest or pleasure in doing things 0   Q2: Feeling down, depressed or hopeless 0   PHQ-2 Score 0   PHQ-2 Total Score (12-17 Years)- Positive if 3 or more points; Administer PHQ-A if positive -   Q1: Little interest or pleasure in doing things Not at all   Q2: Feeling down, depressed or hopeless Not at all   PHQ-2 Score 0     Abuse: Current or Past (Physical, Sexual or Emotional) - No  Do you feel safe in your environment? Yes    Have you ever done Advance Care Planning? (For example, a Health Directive, POLST, or a discussion with a medical  provider or your loved ones about your wishes): No, advance care planning information given to patient to review.  Patient declined advance care planning discussion at this time.    Social History     Tobacco Use     Smoking status: Former Smoker     Packs/day: 0.50     Years: 10.00     Pack years: 5.00     Types: Cigarettes     Start date: 1984     Quit date: 1/1/2006     Years since quitting: 15.9     Smokeless tobacco: Never Used     Tobacco comment: quit 2006   Substance Use Topics     Alcohol use: Yes     Alcohol/week: 0.8 standard drinks     Types: 1 Standard drinks or equivalent per week     Comment: once a month      Alcohol Use 11/23/2021   Prescreen: >3 drinks/day or >7 drinks/week? No     Reviewed orders with patient.  Reviewed health maintenance and updated orders accordingly - Yes  Labs reviewed in EPIC    Breast Cancer Screening:  Any new diagnosis of family breast, ovarian, or bowel cancer? No    FHS-7: No flowsheet data found.    Mammogram Screening: Recommended annual mammography  Pertinent mammograms are reviewed under the imaging tab.    History of abnormal Pap smear: NO - age 30-65 PAP every 5 years with negative HPV co-testing recommended     Reviewed and updated as needed this visit by clinical staff  Tobacco  Allergies    Med Hx  Surg Hx  Fam Hx  Soc Hx       Reviewed and updated as needed this visit by Provider                   Review of Systems   Constitutional: Negative for chills and fever.   HENT: Negative for congestion, ear pain, hearing loss and sore throat.    Eyes: Positive for visual disturbance. Negative for pain.   Respiratory: Positive for shortness of breath. Negative for cough.    Cardiovascular: Positive for palpitations and peripheral edema. Negative for chest pain.   Gastrointestinal: Positive for nausea. Negative for abdominal pain, constipation, diarrhea, heartburn and hematochezia.   Breasts:  Positive for breast mass. Negative for tenderness and discharge.  "  Genitourinary: Positive for urgency. Negative for dysuria, frequency, genital sores, hematuria, pelvic pain, vaginal bleeding and vaginal discharge.   Musculoskeletal: Positive for arthralgias and myalgias. Negative for joint swelling.   Skin: Positive for rash.   Neurological: Negative for dizziness, weakness, headaches and paresthesias.   Psychiatric/Behavioral: Negative for mood changes. The patient is not nervous/anxious.    eye problems for years but just went to the eye doctor again and normal  Occasional temple pain - had normal sed rate  nausea - occasional wave without cause and gone quickly  Breast mass - left breast medial in a row.  Has had for 1-2 yrs but feels like it is bigger  Urgency - feels like has to go when has to go  Palpitations - notices occasionally and will get  Lightheadedness but not at the same time       OBJECTIVE:   /82 (BP Location: Right arm, Cuff Size: Adult Large)   Pulse 90   Temp 97.8  F (36.6  C) (Tympanic)   Resp 14   Ht 1.645 m (5' 4.75\")   Wt 106.1 kg (233 lb 14.4 oz)   LMP 07/29/2013   SpO2 99%   BMI 39.22 kg/m    Physical Exam  GENERAL: healthy, alert and no distress  EYES: Eyes grossly normal to inspection, PERRL and conjunctivae and sclerae normal  HENT: ear canals and TM's normal, nose and mouth without ulcers or lesions  NECK: no adenopathy, no asymmetry, masses, or scars and thyroid normal to palpation  RESP: lungs clear to auscultation - no rales, rhonchi or wheezes  CV: regular rate and rhythm, normal S1 S2, no S3 or S4, no murmur, click or rub, no peripheral edema and peripheral pulses strong  ABDOMEN: soft, nontender, no hepatosplenomegaly, no masses and bowel sounds normal  MS: lebron LE swelling ankle to knee with tenderness.    SKIN: no suspicious lesions or rashes  NEURO: Normal strength and tone, mentation intact and speech normal  PSYCH: mentation appears normal, affect normal/bright    Diagnostic Test Results:  Labs reviewed in " Epic    ASSESSMENT/PLAN:   Routine general medical examination at a health care facility  Routine health education discussed: calcium, diet, exercise, weight, safety.     Supraventricular tachycardia (H)  Occasional symptoms but not limiting her.  Notify if worsening    Morbid obesity (H)  Exercise limited with pain, swims in summer.  Encouraged healthy diet    Neuropathic pain syndrome (non-herpetic)  Change from gabapentin to lyrica and add amitriptyline  - pregabalin (LYRICA) 50 MG capsule; Take 1 capsule (50 mg) by mouth 3 times daily Start one tab at bedtime x3d then 1 tab twice daily x3d then 1 tab three times daily  - amitriptyline (ELAVIL) 10 MG tablet; 1-3 tabs at bedtime to help with sleep and pain    Venous (peripheral) insufficiency  Refer for eval  - Vascular Surgery Referral; Future    Screen for colon cancer  Discussed colon cancer screening options including FIT test, cologuard and colonoscopy.  Colonoscopy will be fully covered as screening and be able to remove any polyps found but requires time off work to complete and is more invasive.  FIT and cologuard are stool tests done from home and find most abnormalities but if abnormal then a colonoscopy is needed and that will not be covered as a screening test.     - COLOGUARD([employer])    Pain in both lower extremities  Refer for evaluation with vein surgery.    - Vascular Surgery Referral; Future  - amitriptyline (ELAVIL) 10 MG tablet; 1-3 tabs at bedtime to help with sleep and pain      Schatzki's ring  refill  - omeprazole (PRILOSEC) 20 MG DR capsule; Take 1 capsule (20 mg) by mouth daily    Fibromyalgia  Offered pool therapy but declines.  Start amitriptyline and change gabapentin to lyrica.  Follow-up on mychart  - amitriptyline (ELAVIL) 10 MG tablet; 1-3 tabs at bedtime to help with sleep and pain    Elevated BP without diagnosis of hypertension  May be due to pain.  Recheck in 1-2 mos      Inflammatory and toxic neuropathy (H)  Change to  "lyrica per orders.            Patient has been advised of split billing requirements and indicates understanding: Yes - 55 mins total spent with patient and 30 mins of that in addressing health concerns beyond preventive care.    COUNSELING:  Reviewed preventive health counseling, as reflected in patient instructions    Estimated body mass index is 39.22 kg/m  as calculated from the following:    Height as of this encounter: 1.645 m (5' 4.75\").    Weight as of this encounter: 106.1 kg (233 lb 14.4 oz).    Weight management plan: Discussed healthy diet and exercise guidelines    She reports that she quit smoking about 15 years ago. Her smoking use included cigarettes. She started smoking about 37 years ago. She has a 5.00 pack-year smoking history. She has never used smokeless tobacco.      Counseling Resources:  ATP IV Guidelines  Pooled Cohorts Equation Calculator  Breast Cancer Risk Calculator  BRCA-Related Cancer Risk Assessment: FHS-7 Tool  FRAX Risk Assessment  ICSI Preventive Guidelines  Dietary Guidelines for Americans, 2010  USDA's MyPlate  ASA Prophylaxis  Lung CA Screening    Gabriela Saenz MD  [facility]  "

## 2021-11-23 NOTE — PATIENT INSTRUCTIONS
Preventive Health Recommendations  Female Ages 50 - 64    Yearly exam: See your health care provider every year in order to  o Review health changes.   o Discuss preventive care.    o Review your medicines if your doctor has prescribed any.      Get a Pap test every three years (unless you have an abnormal result and your provider advises testing more often).    If you get Pap tests with HPV test, you only need to test every 5 years, unless you have an abnormal result.     You do not need a Pap test if your uterus was removed (hysterectomy) and you have not had cancer.    You should be tested each year for STDs (sexually transmitted diseases) if you're at risk.     Have a mammogram every 1 to 2 years.    Have a colonoscopy at age 50, or have a yearly FIT test (stool test). These exams screen for colon cancer.      Have a cholesterol test every 5 years, or more often if advised.    Have a diabetes test (fasting glucose) every three years. If you are at risk for diabetes, you should have this test more often.     If you are at risk for osteoporosis (brittle bone disease), think about having a bone density scan (DEXA).    Shots: Get a flu shot each year. Get a tetanus shot every 10 years.    Nutrition:     Eat at least 5 servings of fruits and vegetables each day.    Eat whole-grain bread, whole-wheat pasta and brown rice instead of white grains and rice.    Get adequate Calcium and Vitamin D.     Lifestyle    Exercise at least 150 minutes a week (30 minutes a day, 5 days a week). This will help you control your weight and prevent disease.    Limit alcohol to one drink per day.    No smoking.     Wear sunscreen to prevent skin cancer.     See your dentist every six months for an exam and cleaning.    See your eye doctor every 1 to 2 years.    Drop the gabapentin to 1 at bedtime and start one lyrica.  In 3 days drop the bedtime gabapentin and morning dose and add morning lyrica and start evening amitryptiline.  In 3  more days, stop gabapentin totally and take lyrica three times daily.  Let me know if it isn't controlling your pain and we can increase the dose.     Try voltaren (diclofenac) gel twice daily to your lower legs to see if it helps.

## 2021-11-24 ENCOUNTER — RADIOLOGY INJECTION OFFICE VISIT (OUTPATIENT)
Dept: PALLIATIVE MEDICINE | Facility: CLINIC | Age: 61
End: 2021-11-24
Attending: INTERNAL MEDICINE
Payer: COMMERCIAL

## 2021-11-24 VITALS — OXYGEN SATURATION: 97 % | SYSTOLIC BLOOD PRESSURE: 150 MMHG | HEART RATE: 75 BPM | DIASTOLIC BLOOD PRESSURE: 92 MMHG

## 2021-11-24 DIAGNOSIS — M54.16 LUMBAR RADICULOPATHY: Primary | ICD-10-CM

## 2021-11-24 DIAGNOSIS — M51.369 LUMBAR DEGENERATIVE DISC DISEASE: ICD-10-CM

## 2021-11-24 PROCEDURE — 64483 NJX AA&/STRD TFRM EPI L/S 1: CPT | Mod: LT | Performed by: ANESTHESIOLOGY

## 2021-11-24 RX ORDER — DEXAMETHASONE SODIUM PHOSPHATE 10 MG/ML
10 INJECTION INTRAMUSCULAR; INTRAVENOUS ONCE
Status: COMPLETED | OUTPATIENT
Start: 2021-11-24 | End: 2021-11-24

## 2021-11-24 RX ADMIN — DEXAMETHASONE SODIUM PHOSPHATE 10 MG: 10 INJECTION INTRAMUSCULAR; INTRAVENOUS at 08:47

## 2021-11-24 NOTE — PATIENT INSTRUCTIONS
[facility] Procedure Discharge Instructions    Today you saw: Dr. Dominique Buenrostro      Your procedure:  Epidural steroid injection       Medications used:  Lidocaine (anesthetic)     Dexamethasone (steroid)         Omnipaque (contrast)             Be cautious when walking as numbness and/or weakness in the legs may occur up to 6-8 hours after the procedure due to effect of the local anesthetic    Do not drive for 6 hours. The effect of the local anesthetic could slow your reflexes.     Avoid strenuous activity for the first 24 hours. You may resume your regular activities after that.     You may shower, however avoid swimming, tub baths or hot tubs for 24 hours following your procedure    You may have a mild to moderate increase in pain for several days following the injection.      You may use ice packs for 10-15 minutes, 3 to 4 times a day at the injection site for comfort    Do not use heat to painful areas for 6 to 8 hours. This will give the local anesthetic time to wear off and prevent you from accidentally burning your skin.    Unless you have been directed to avoid the use of anti-inflammatory medications (NSAIDS-ibuprofen, Aleve, Motrin), you may use these medications or Tylenol for pain control if needed.     With diabetes, check your blood sugar more frequently than usual as your blood sugar may be higher than normal for 10-14 days following a steroid injection. Contact your doctor who manages your diabetes if your blood sugar is higher than usual    Possible side effects of steroids that you may experience include flushing, elevated blood pressure, increased appetite, mild headaches and restlessness.  All of these symptoms will get better with time.    It may take up to 14 days for the steroid medication to start working although you may feel the effect as early as a few days after the procedure.     Follow up with your referring provider in 2-3 weeks      If you experience any of the  following, call the pain center line during work hours at [phone] or on-call physician after hours at [phone]:  -Fever over 100 degree F  -Swelling, bleeding, redness, drainage, warmth at the injection site  -Progressive weakness or numbness in your legs or arms  -Loss of bowel or bladder function  -Unusual headache that is not relieved by Tylenol or your regular headache medication  -Unusual new onset of pain that is not improving

## 2021-11-24 NOTE — NURSING NOTE
Pre-procedure Intake    If YES to any questions or NO to having a   Please complete laminated checklist and leave on the computer keyboard for Provider, verbally inform provider if able.    For SCS Trial, RFA's or any sedation procedure: NA    If yes, for how long?         Are you taking any any blood thinners such as Coumadin, Warfarin, Jantoven, Pradaxa Xarelto, Eliquis, Edoxaban, Enoxaparin, Lovenox, Heparin, Arixtra, Fondaparinux, or Fragmin? OR Antiplatelet medication such as Plavix, Brilinta, or Effient?  NO     If yes, when did you take your last dose?        Do you take aspirin?   NO    If cervical procedure, have you held aspirin for 6 days?   NA    Do you have any allergies to contrast dye, iodine, steroid and/or numbing medications?  NO     Are you currently taking antibiotics or have an active infection?  NO     Have you had a fever/elevated temperature within the past week? NO     Are you currently taking oral steroids? NO     Do you have a ? Yes     Are you pregnant or breastfeeding? NA     Have you received the COVID-19 vaccine? Yes     If yes, was it your 1st, 2nd or only dose needed? Booster dose 10/23/21    Notify provider and RNs if systolic BP >170, diastolic BP >100, P >100 or O2 sats < 90%

## 2021-11-24 NOTE — NURSING NOTE
Discharge Information    IV Discontiued Time:  NA    Amount of Fluid Infused:  NA    Discharge Criteria = When patient returns to baseline or as per MD order    Consciousness:  Pt is fully awake    Circulation:  BP +/- 20% of pre-procedure level    Respiration:  Patient is able to breathe deeply    O2 Sat:  Patient is able to maintain O2 Sat >92% on room air    Activity:  Moves 4 extremities on command    Ambulation:  Patient is able to stand and walk or stand and pivot into wheelchair    Dressing:  Clean/dry or No Dressing    Notes:   Discharge instructions and AVS given to patient    Patient meets criteria for discharge?  YES    Admitted to PCU?  No    Responsible adult present to accompany patient home?  Yes    Signature/Title:    Svetlana Koo RN  RN Care Coordinator  [facility] [place]

## 2021-12-01 ENCOUNTER — TRANSFERRED RECORDS (OUTPATIENT)
Dept: HEALTH INFORMATION MANAGEMENT | Facility: CLINIC | Age: 61
End: 2021-12-01
Payer: COMMERCIAL

## 2021-12-07 NOTE — PROGRESS NOTES
[facility] - Procedure Note    Date of Service: 12/8/2021  Procedure performed: Bilateral Greater Trochanteric Bursa Injection  Diagnosis: Bilateral Trochanteric Bursitis  : Missael Kaufman DO  Anesthesia: none      Indications:   Ana Luisa Rao is a 61 year old female was sent by Dr. Saenz for bilateral hip bursa injections.  They have a history of chronic bilateral hip pain.  Exam shows tenderness with palpation of the GT bursa bilaterally and they have tried conservative treatment including oral medications and exercises. They have had prior GT bursa injections at [facility] with significant pain relief for 6+ months.    Options/alternatives, benefits and risks were discussed with the patient. Questions were answered to her satisfaction and she agrees to proceed. Voluntary informed consent was obtained and signed.     Vitals were reviewed: Yes  Allergies were reviewed:  Yes   Medications were reviewed:  Yes   Pre-procedure pain score: 2/10    Procedure:  After getting informed consent, patient was brought into the procedure suite and was placed in a prone position on the procedure table.   A Pause for the Cause was performed.  Patient was prepped and draped in sterile fashion.     The area over the right trochanter was palpated, and the tender area was identified, corresponding to the area of the trochanteric bursa.  The area was cleaned with Chloroprep.  A 25-gauge, 1.5-inch needle was inserted, aiming towards the trochanter.  When bone was encountered, the needle was slightly drawn.  A solution containing local anesthesic and steroid was injected.  The needle was removed.  Hemostasis was achieved. Bandaids were placed as appropriate.    The procedure was repeated on the opposite side.    In total, 6 ml of 0.5% bupivacaine and 2 ml (80 mg) of kenalog was injected.    Hemostasis was achieved, the area was cleaned, and bandaids were placed when appropriate.  The patient tolerated  the procedure well, and was taken to the recovery room.    Images were saved to PACS.      Pre-procedure pain score: 2/10  Post-procedure pain score: 2/10     Assessment/Plan: Ana Luisa Rao is a 61 year old female s/p bilateral greater trochanteric bursa steroid injection today for hip pan.     1. Following today's procedure, the patient was advised to contact the [facility] for any of the following:   Fever, chills, or night sweats   New onset of pain, numbness, or weakness   Any questions/concerns regarding the procedure  If unable to contact the Pain Center, the patient was instructed to go to a local Emergency Room for any complications.   2. The patient should follow-up with the referring provider in 4 weeks for post-procedure evaluation.        Missael Kaufman DO  [facility] [place]

## 2021-12-08 ENCOUNTER — OFFICE VISIT (OUTPATIENT)
Dept: PALLIATIVE MEDICINE | Facility: CLINIC | Age: 61
End: 2021-12-08
Attending: INTERNAL MEDICINE
Payer: COMMERCIAL

## 2021-12-08 VITALS — SYSTOLIC BLOOD PRESSURE: 135 MMHG | DIASTOLIC BLOOD PRESSURE: 82 MMHG | OXYGEN SATURATION: 98 % | HEART RATE: 94 BPM

## 2021-12-08 DIAGNOSIS — M25.552 BILATERAL HIP PAIN: ICD-10-CM

## 2021-12-08 DIAGNOSIS — M70.62 TROCHANTERIC BURSITIS OF BOTH HIPS: Primary | ICD-10-CM

## 2021-12-08 DIAGNOSIS — M70.61 TROCHANTERIC BURSITIS OF BOTH HIPS: Primary | ICD-10-CM

## 2021-12-08 DIAGNOSIS — M25.551 BILATERAL HIP PAIN: ICD-10-CM

## 2021-12-08 PROCEDURE — 20610 DRAIN/INJ JOINT/BURSA W/O US: CPT | Mod: 50 | Performed by: PHYSICAL MEDICINE & REHABILITATION

## 2021-12-08 RX ORDER — TRIAMCINOLONE ACETONIDE 40 MG/ML
80 INJECTION, SUSPENSION INTRA-ARTICULAR; INTRAMUSCULAR ONCE
Status: COMPLETED | OUTPATIENT
Start: 2021-12-08 | End: 2021-12-08

## 2021-12-08 RX ORDER — BUPIVACAINE HYDROCHLORIDE 5 MG/ML
6 INJECTION, SOLUTION EPIDURAL; INTRACAUDAL ONCE
Status: COMPLETED | OUTPATIENT
Start: 2021-12-08 | End: 2021-12-08

## 2021-12-08 RX ADMIN — BUPIVACAINE HYDROCHLORIDE 30 MG: 5 INJECTION, SOLUTION EPIDURAL; INTRACAUDAL at 08:55

## 2021-12-08 RX ADMIN — TRIAMCINOLONE ACETONIDE 80 MG: 40 INJECTION, SUSPENSION INTRA-ARTICULAR; INTRAMUSCULAR at 08:55

## 2021-12-08 NOTE — Clinical Note
Bilateral hip bursa injections completed.   Thanks,    Misseal Kaufman [facility]

## 2021-12-08 NOTE — PATIENT INSTRUCTIONS
[facility]  Post Procedure Instructions    Today you had: Bilateral trochanteric bursa injection      Medications used:  bupivicaine   kenolog           Go to the emergency room if you develop any shortness of breath    Monitor the injection sites for signs and symptoms of infection-fever, chills, redness, swelling, warmth, or drainage to areas.    You may have soreness at injection sites for up to 24 hours.    You may apply ice to the painful areas to help minimize the discomfort of the needle pokes.    Do not apply heat to sites for at least 12 hours.    You may use anti-inflammatory medications or Tylenol for pain control if necessary    Pain Clinic phone number during work hours (Monday through Friday 8 am-4:30 pm) at [phone] or the Provider Line after hours at [phone]:

## 2021-12-12 LAB — COLOGUARD-ABSTRACT: NEGATIVE

## 2021-12-13 ENCOUNTER — OFFICE VISIT (OUTPATIENT)
Dept: ORTHOPEDICS | Facility: CLINIC | Age: 61
End: 2021-12-13
Payer: COMMERCIAL

## 2021-12-13 DIAGNOSIS — Z47.89 ORTHOPEDIC AFTERCARE: Primary | ICD-10-CM

## 2021-12-13 PROCEDURE — 99024 POSTOP FOLLOW-UP VISIT: CPT | Performed by: PHYSICIAN ASSISTANT

## 2021-12-13 NOTE — PROGRESS NOTES
HISTORY OF PRESENT ILLNESS:    Ana Luisa Rao is a 61 year old female who is seen in follow up for Right CTR, DOS 12/01/2021, Dr. Leigh - Carpal tunnel release.  Present symptoms: Pt reports improvement to CT symptoms.  Is keeping covered. Pt is lightly using hand for activities. No new complaints.  Denies Chest pain, Calve pain, Fever, Chills.    Current Treatment: Postop.    PHYSICAL EXAM:  There were no vitals taken for this visit.  There is no weight on file to calculate BMI.   GENERAL APPEARANCE: healthy, alert and no distress   PSYCH: mentation appears normal and affect normal/bright    MSK:  Right:  Volar wrist.  Incision clean and dry, Sutures present, healing.  No incisional erythema.   No Ecchymosis.  Edema min at wrist, hand and digits.  CMS: elliott incisional numbness, otherwise grossly intact to digits.  AROM: mild restriction in palmar wrist ext/ flexion, otherwise WNL.    NOTE:  Distal suture broke when removing, required several minutes to remove buried fragment, but was successful and pt tolerated well.     ASSESSMENT:  Ana Luisa Rao is a 61 year old female  S/P Right CTR, DOS 12/01/2021, Dr. Leigh - CTR.  Symptom  improvement. Healing.  We discussed that CT symptoms may improve for several months after surgery.    PLAN:  - Surgery discussed, images reviewed if applicable, and all questions were answered at this time.  - Sutures removed with sterile technique, steri-strips applied in usual fashion, care instructions given and verbally acknowledged.  - Medications: Taper RX pain meds, OTC PRN.  - Physical Therapy: As instructed / RICE and PROM.  - AAT    Return to clinic PRN.    Memo Edward PA-C    Dept. Orthopedic Surgery  [facility]     12/13/2021

## 2021-12-29 ENCOUNTER — OFFICE VISIT (OUTPATIENT)
Dept: VASCULAR SURGERY | Facility: CLINIC | Age: 61
End: 2021-12-29
Attending: INTERNAL MEDICINE
Payer: COMMERCIAL

## 2021-12-29 DIAGNOSIS — M79.605 PAIN IN BOTH LOWER EXTREMITIES: ICD-10-CM

## 2021-12-29 DIAGNOSIS — M79.604 PAIN IN BOTH LOWER EXTREMITIES: ICD-10-CM

## 2021-12-29 DIAGNOSIS — I87.2 VENOUS (PERIPHERAL) INSUFFICIENCY: ICD-10-CM

## 2021-12-29 DIAGNOSIS — I83.813 VARICOSE VEINS OF BILATERAL LOWER EXTREMITIES WITH PAIN: Primary | ICD-10-CM

## 2021-12-29 DIAGNOSIS — R60.9 LIPEDEMA: ICD-10-CM

## 2021-12-29 PROCEDURE — 99203 OFFICE O/P NEW LOW 30 MIN: CPT | Performed by: SURGERY

## 2021-12-29 RX ORDER — DIOSMIN COMPLEX NO.1 630 MG
630 TABLET ORAL DAILY
Qty: 90 TABLET | Refills: 1 | Status: SHIPPED | OUTPATIENT
Start: 2021-12-29 | End: 2023-01-17

## 2021-12-29 NOTE — LETTER
"    12/29/2021         RE: Ana Luisa Rao  [address]  [facility] [address]        Dear Colleague,    Thank you for referring your patient, Ana Luisa Rao, to the [facility] [place]. Please see a copy of my visit note below.        After Visit Follow Up  Dr. Carr recommended EdemaWear and Vasculera. Pt was given the phone number and website for the EdemaWear, with instructions.   For Vasculera, pt was notified that it was e-prescribed to [facility], they will reach out to pt to get her information and give estimated costs.      DIEGO Tubbs    VEINSOLUTIONS CONSULTATION    HPI:    Ana Luisa Rao is a pleasant 61 year old female referred by Gabriela Saenz MD for bilateral lower extremity pain and areas of excess tissue deposition on the proximal sebastien-medial legs bilaterally.  These pockets of tissue make the patient's legs feel quite tight when she squats and very sensitive if she gets on her knees on the ground.  She admits to \"swelling\" of her legs for some 15 years.  She states that she lost a significant amount of weight about 7 years ago for her daughter's wedding but noted that her legs, though somewhat smaller, were still quite large.    Her legs are quite tender if bumped.  Walking does not necessarily exacerbate the pain but she is quite concerned by the tenderness of her legs, especially from the knees to the ankles, and her inability to squat to do things or to get on her knees on the ground for chores or working in the yard.    She has no history of deep vein thrombosis, superficial thrombophlebitis or hemorrhage.    I specifically asked her if other family members have larger legs and she commented that they did not.  She was only recently was made aware of the possibility of lipedema as a contributor to her condition.  Her family history is significant for varicose veins.    PAST MEDICAL HISTORY:   Past Medical History:   Diagnosis Date     Benign essential tremor  " "    Cervicalgia      Chronic rhinitis      Eczema      Esophageal Reflux/ Schatzke's ring/ PPI long term 2008     Fibromyalgia syndrome      Former smoker      GERD with esophagitis      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     Knee pain      Leg edema      Obesity      Oropharyngeal dysphagia      Palpitations      Peripheral venous insufficiency      Psoriasis      Pulmonary nodules 2018    per chest CT-In Care Everywhere     Reflux esophagitis      Restless legs syndrome      Sjogren's disease (H)      SOB (shortness of breath)      SVT (supraventricular tachycardia) (H)        PAST SURGICAL HISTORY:   Past Surgical History:   Procedure Laterality Date     CARPAL TUNNEL RELEASE RT/LT Right 2021    Right carpal tunnel release under local anesthetic, Dr. Dami Leigh, [facility]     ENDOSCOPY  ,     dysphagia     UPPER GI ENDOSCOPY  2013    GERD/ Schatzke's ring     UPPER GI ENDOSCOPY  2017    Schatzkies ring       FAMILY HISTORY:   Family History   Problem Relation Age of Onset     Connective Tissue Disorder Mother         \"vasculitis\"     Blood Disease Mother         high platelets     Hypertension Father      Lupus Sister      Sjogren's Sister      Blood Disease Brother         hemocrhomatosis     Cancer - colorectal Maternal Uncle         73     Breast Cancer No family hx of      Diabetes No family hx of        SOCIAL HISTORY:   Social History     Tobacco Use     Smoking status: Former Smoker     Packs/day: 0.50     Years: 10.00     Pack years: 5.00     Types: Cigarettes     Start date:      Quit date: 2006     Years since quittin.0     Smokeless tobacco: Never Used     Tobacco comment: quit 2006   Substance Use Topics     Alcohol use: Yes     Alcohol/week: 0.8 standard drinks     Types: 1 Standard drinks or equivalent per week     Comment: once a month        REVIEW OF SYSTEMS: Review Of Systems  Skin: Rash, finger toenail " problems  Eyes: Vision loss  Ears/Nose/Throat: Dizziness  Respiratory: Shortness of breath when lying flat, productive cough  Cardiovascular: Irregular heartbeat  Gastrointestinal: negative  Genitourinary: negative  Musculoskeletal: Arthritis, back pain, nocturnal foot pain, leg pain, weakness, leg swelling  Neurologic: negative  Psychiatric: negative  Hematologic/Lymphatic/Immunologic: Easy bruising, bleeding veins  Endocrine: Postmenopausal cardiovascular she has      Vital signs:  LMP 07/29/2013     Current Outpatient Medications   Medication Sig Dispense Refill     Dietary Management Product (VASCULERA) TABS Take 630 mg by mouth daily Take 630 mg 2 times daily by mouth for 7 days. After 7 days, take 1 tablet daily. 90 tablet 1     amitriptyline (ELAVIL) 10 MG tablet 1-3 tabs at bedtime to help with sleep and pain 90 tablet 2     BENADRYL DECONGESTANT 25-60 MG OR TABS 1 TABLET EVERY 6 HOURS AS NEEDED       clobetasol (TEMOVATE) 0.05 % cream Apply sparingly to affected area twice daily as needed.  Do not apply to face. 30 g 0     fluocinonide (LIDEX) 0.05 % external cream        fluticasone (FLONASE) 50 MCG/ACT nasal spray USE 1-2 SPRAYS INTO BOTH NOSTRILS ONCE DAILY 16 g 11     hydrocortisone 2.5 % lotion  (Patient not taking: Reported on 12/8/2021)       MULTIVITAMIN OR None Entered       omeprazole (PRILOSEC) 20 MG DR capsule Take 1 capsule (20 mg) by mouth daily 90 capsule 1     polyethylene glycol 0.4%- propylene glycol 0.3% (SYSTANE ULTRA) 0.4-0.3 % SOLN ophthalmic solution Apply 1 drop to eye as needed       pregabalin (LYRICA) 50 MG capsule Take 1 capsule (50 mg) by mouth 3 times daily Start one tab at bedtime x3d then 1 tab twice daily x3d then 1 tab three times daily 90 capsule 1     TUMS 500 MG OR CHEW 1 TABLET 3 TIMES DAILY PRN         PHYSICAL EXAM:  General: Pleasant, NAD.   HEENT: Normocephalic, atraumatic, external ears and nose normal.   Respiratory: Normal respiratory effort.   Cardiovascular:  Pulse is regular.   Musculoskeletal: Gait and station normal.  The joints of her fingers and toes without deformity.  There is no cyanosis of her nailbeds.   EXTREMITIES: Right lower extremity: Excess fatty deposition on thighs.  No significant varicose veins.  There is a large protuberant deposition of soft tissue over the proximal anteromedial right leg.  The remainder of the leg is larger, tender with a significant ankle cutoff sign consistent with lipedema.  No significant edema of her ankles.  Scattered reticular veins but no significant right lower extremity varicose veins    Left lower extremity: The leg appears very similar to the right leg with adipose tissue deposition of the thigh.  Large conglomeration of soft tissue over the proximal anteromedial left leg and an ankle cut off sound consistent with lipedema.  No edema of her foot  PULSES: R/L (3=normal pulse, 0=no palpable pulse) dorsalis pedis: 2/2; posterior tibial: 1/1.      Neurologic: Grossly normal  Psychiatric: Mood, affect, judgment and insight are normal     Venous Duplex Ultrasound: (2018)  No significant venous insufficiency    ASSESSMENT:  Clinically she appears to have lipedema.  She does have some painful varicosities on the lateral left leg, however.  I discussed the lower extremity venous anatomy and the pathophysiology of venous insufficiency.  My initial suspicion for her to have significant venous insufficiency at this time is low but, as it has been at least 3 years that she has had a lower extremity venous study, I would recommend we ensure that there have been no changes in the lower extremity venous anatomy or competency.    We discussed the clinical findings of lipedema and the fact that this could be contributing to the tenderness of her legs.  I would like to be sure that she does not have any venous insufficiency contributing to her symptoms.  We discussed the use of compression, edema wear, Vasculera and adequate physical  activity with 10,000 daily steps.  Also had a janice discussion about monitoring her diet and cutting out unnecessary aspects of her diet.    PLAN:  Bilateral extremity venous Copsey studies with video visit to discuss results.  We will add the other adjunctive measures as discussed above.  I will will also discuss with some of my vascular surgery colleagues who have more experience regarding management of lipedema.  This was discussed frankly with the patient who voiced understanding and his questions were answered.     Noel Carr MD    Dictated using Dragon voice recognition software which may result in transcription errors          VE[facility] LEG DRAWING:                  Again, thank you for allowing me to participate in the care of your patient.        Sincerely,        Noel Carr MD

## 2021-12-29 NOTE — PROGRESS NOTES
"VEINSOLUTIONS CONSULTATION    HPI:    Ana Luisa Rao is a pleasant 61 year old female referred by Gabriela Saenz MD for bilateral lower extremity pain and areas of excess tissue deposition on the proximal sebastien-medial legs bilaterally.  These pockets of tissue make the patient's legs feel quite tight when she squats and very sensitive if she gets on her knees on the ground.  She admits to \"swelling\" of her legs for some 15 years.  She states that she lost a significant amount of weight about 7 years ago for her daughter's wedding but noted that her legs, though somewhat smaller, were still quite large.    Her legs are quite tender if bumped.  Walking does not necessarily exacerbate the pain but she is quite concerned by the tenderness of her legs, especially from the knees to the ankles, and her inability to squat to do things or to get on her knees on the ground for chores or working in the yard.    She has no history of deep vein thrombosis, superficial thrombophlebitis or hemorrhage.    I specifically asked her if other family members have larger legs and she commented that they did not.  She was only recently was made aware of the possibility of lipedema as a contributor to her condition.  Her family history is significant for varicose veins.    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 Dami Leigh, [facility]     ENDOSCOPY  ,     dysphagia     UPPER GI ENDOSCOPY  2013    GERD/ Maricruz's ring     UPPER GI ENDOSCOPY  2017    Schatzkies ring       FAMILY HISTORY:   Family History   Problem Relation Age of Onset     Connective Tissue Disorder Mother         \"vasculitis\"     Blood Disease Mother         high platelets     Hypertension Father      Lupus Sister      Sjogren's Sister      Blood Disease Brother         hemocrhomatosis     Cancer - colorectal Maternal Uncle         73     Breast Cancer No family hx of      Diabetes No family hx of        SOCIAL HISTORY:   Social History     Tobacco Use     Smoking status: Former Smoker     Packs/day: 0.50     Years: 10.00     Pack years: 5.00     Types: Cigarettes     Start date:      Quit date: 2006     Years since quittin.0     Smokeless tobacco: Never Used     Tobacco comment: quit 2006   Substance Use Topics     Alcohol use: Yes     Alcohol/week: 0.8 standard drinks     Types: 1 Standard drinks or equivalent per week     Comment: once a month        REVIEW OF SYSTEMS: Review Of Systems  Skin: Rash, finger toenail problems  Eyes: Vision loss  Ears/Nose/Throat: Dizziness  Respiratory: Shortness of breath when lying flat, productive cough  Cardiovascular: Irregular heartbeat  Gastrointestinal: negative  Genitourinary: negative  Musculoskeletal: Arthritis, back pain, nocturnal foot pain, leg pain, weakness, leg swelling  Neurologic: negative  Psychiatric: negative  Hematologic/Lymphatic/Immunologic: Easy bruising, bleeding veins  Endocrine: Postmenopausal cardiovascular she has      Vital signs:  LMP 2013     Current Outpatient Medications   Medication Sig Dispense Refill     Dietary " Management Product (VASCULERA) TABS Take 630 mg by mouth daily Take 630 mg 2 times daily by mouth for 7 days. After 7 days, take 1 tablet daily. 90 tablet 1     amitriptyline (ELAVIL) 10 MG tablet 1-3 tabs at bedtime to help with sleep and pain 90 tablet 2     BENADRYL DECONGESTANT 25-60 MG OR TABS 1 TABLET EVERY 6 HOURS AS NEEDED       clobetasol (TEMOVATE) 0.05 % cream Apply sparingly to affected area twice daily as needed.  Do not apply to face. 30 g 0     fluocinonide (LIDEX) 0.05 % external cream        fluticasone (FLONASE) 50 MCG/ACT nasal spray USE 1-2 SPRAYS INTO BOTH NOSTRILS ONCE DAILY 16 g 11     hydrocortisone 2.5 % lotion  (Patient not taking: Reported on 12/8/2021)       MULTIVITAMIN OR None Entered       omeprazole (PRILOSEC) 20 MG DR capsule Take 1 capsule (20 mg) by mouth daily 90 capsule 1     polyethylene glycol 0.4%- propylene glycol 0.3% (SYSTANE ULTRA) 0.4-0.3 % SOLN ophthalmic solution Apply 1 drop to eye as needed       pregabalin (LYRICA) 50 MG capsule Take 1 capsule (50 mg) by mouth 3 times daily Start one tab at bedtime x3d then 1 tab twice daily x3d then 1 tab three times daily 90 capsule 1     TUMS 500 MG OR CHEW 1 TABLET 3 TIMES DAILY PRN         PHYSICAL EXAM:  General: Pleasant, NAD.   HEENT: Normocephalic, atraumatic, external ears and nose normal.   Respiratory: Normal respiratory effort.   Cardiovascular: Pulse is regular.   Musculoskeletal: Gait and station normal.  The joints of her fingers and toes without deformity.  There is no cyanosis of her nailbeds.   EXTREMITIES: Right lower extremity: Excess fatty deposition on thighs.  No significant varicose veins.  There is a large protuberant deposition of soft tissue over the proximal anteromedial right leg.  The remainder of the leg is larger, tender with a significant ankle cutoff sign consistent with lipedema.  No significant edema of her ankles.  Scattered reticular veins but no significant right lower extremity varicose  veins    Left lower extremity: The leg appears very similar to the right leg with adipose tissue deposition of the thigh.  Large conglomeration of soft tissue over the proximal anteromedial left leg and an ankle cut off sound consistent with lipedema.  No edema of her foot  PULSES: R/L (3=normal pulse, 0=no palpable pulse) dorsalis pedis: 2/2; posterior tibial: 1/1.      Neurologic: Grossly normal  Psychiatric: Mood, affect, judgment and insight are normal     Venous Duplex Ultrasound: (2018)  No significant venous insufficiency    ASSESSMENT:  Clinically she appears to have lipedema.  She does have some painful varicosities on the lateral left leg, however.  I discussed the lower extremity venous anatomy and the pathophysiology of venous insufficiency.  My initial suspicion for her to have significant venous insufficiency at this time is low but, as it has been at least 3 years that she has had a lower extremity venous study, I would recommend we ensure that there have been no changes in the lower extremity venous anatomy or competency.    We discussed the clinical findings of lipedema and the fact that this could be contributing to the tenderness of her legs.  I would like to be sure that she does not have any venous insufficiency contributing to her symptoms.  We discussed the use of compression, edema wear, Vasculera and adequate physical activity with 10,000 daily steps.  Also had a janice discussion about monitoring her diet and cutting out unnecessary aspects of her diet.    PLAN:  Bilateral extremity venous Copsey studies with video visit to discuss results.  We will add the other adjunctive measures as discussed above.  I will will also discuss with some of my vascular surgery colleagues who have more experience regarding management of lipedema.  This was discussed frankly with the patient who voiced understanding and his questions were answered.     Nole Carr MD    Dictated using Jasmyn  voice recognition software which may result in transcription errors          VEIN [facility]IC LEG DRAWING:

## 2021-12-29 NOTE — PROGRESS NOTES
After Visit Follow Up  Dr. Carr recommended EdemaWear and Vasculera. Pt was given the phone number and website for the EdemaWear, with instructions.   For Vasculera, pt was notified that it was e-prescribed to [facility], they will reach out to pt to get her information and give estimated costs.      DIEGO Tubbs

## 2021-12-29 NOTE — NURSING NOTE
Patient Reported symptoms:    Right leg   Heaviness Most of the time   Achiness Most of the time   Swelling All of the time  Throbbing A little of the time   Itching A little of the time   Appearance Extremely noticeable   Impact on work/activities Severely reduced     Left Leg   Heaviness Most of the time   Achiness All of the time  Swelling All of the time  Throbbing A little of the time   Itching A little of the time   Appearance Extremely noticeable   Impact on work/activities unable to participate

## 2021-12-30 ENCOUNTER — ANCILLARY PROCEDURE (OUTPATIENT)
Dept: ULTRASOUND IMAGING | Facility: CLINIC | Age: 61
End: 2021-12-30
Attending: SURGERY
Payer: COMMERCIAL

## 2021-12-30 ENCOUNTER — VIRTUAL VISIT (OUTPATIENT)
Dept: VASCULAR SURGERY | Facility: CLINIC | Age: 61
End: 2021-12-30
Attending: SURGERY
Payer: COMMERCIAL

## 2021-12-30 DIAGNOSIS — M79.604 PAIN IN BOTH LOWER EXTREMITIES: ICD-10-CM

## 2021-12-30 DIAGNOSIS — M79.605 PAIN IN BOTH LOWER EXTREMITIES: ICD-10-CM

## 2021-12-30 DIAGNOSIS — R60.9 LIPEDEMA: Primary | ICD-10-CM

## 2021-12-30 DIAGNOSIS — I83.813 VARICOSE VEINS OF BILATERAL LOWER EXTREMITIES WITH PAIN: ICD-10-CM

## 2021-12-30 PROCEDURE — 93970 EXTREMITY STUDY: CPT | Performed by: SURGERY

## 2021-12-30 PROCEDURE — 99213 OFFICE O/P EST LOW 20 MIN: CPT | Mod: 95 | Performed by: SURGERY

## 2021-12-30 NOTE — PROGRESS NOTES
Ana Luisa is a 61 year old who is being evaluated via a billable video visit.      How would you like to obtain your AVS? MyChart  If the video visit is dropped, the invitation should be resent by: Text to cell phone: [phone]  Will anyone else be joining your video visit? No      Video Start Time: 3:30 PM      Video-Visit Details    Type of service:  Video Visit    Video End Time:3:40 PM    Originating Location (pt. 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  She has varicose veins of her lateral left leg which appear to have enlarged over the last several years, therefore we obtained bilateral extremity venous Copsey studies to ensure that she did not have a significant contribution from venous insufficiency.  Please see my consultation of 12/29/2021 for details.  She returns today for bilateral lower extremity venous competency studies, the results of which we will discuss on today's video visit    Physical Exam  General: Pleasant female in no acute distress.  Blood pressure 138/84, pulse 82  Extremities: Classic findings of lipedema with bilateral ankle cutoff signs and no edema of her feet.  Soft tissue deposition on the proximal anteromedial legs bilaterally.  A few small varicose veins on the lateral left leg with scattered bilateral lower extremity telangiectasias and reticular veins.  No venous stasis changes.    Ultrasound:  INDICATION:  Patient is referred for varicose veins with pain.      EXAM TYPE  BILATERAL LOWER EXTREMITY VENOUS DUPLEX FOR VENOUS INSUFFICIENCY  TECHNICAL SUMMARY     A duplex ultrasound study using color flow was performed, to evaluate the bilateral lower extremity veins for valvular incompetence with the patient in a steep  reversed trendelenberg.      RIGHT:     The deep veins demonstrate phasic flow, compress and respond to augmentations.  There is no DVT.  The common femoral vein is incompetent and free of thrombus. The remaining deep veins are competent and free of thrombus.      The GSV demonstrates phasic flow, compresses and responds to augmentations from the saphenofemoral junction to the ankle with no evidence of thrombus. The great saphenous vein measures 6.4 mm at the saphenofemoral junction, 5.0 mm at the proximal thigh, and 4.8 mm at the knee. The GSV is incompetent from Proximal Thigh to Mid Thigh, with the greatest reflux time of 5279 milliseconds.  The GSV gives rise to a varicose branch measuring 3.2 mm off the  Distal Thigh that courses Medial with a reflux time of 8050 milliseconds.      The AASV is not visualized.      The Giacomini vein is absent.     The SSV demonstrates phasic flow, compresses and responds to augmentations from the popliteal space to the ankle.  No reflux or thrombus is seen. The saphenopopliteal junction is absent.      Perforators: there is no evidence of incompetent  veins at any level.      LEFT:     The deep veins demonstrate phasic flow, compress and respond to augmentations.  There is no reflux or DVT.       The GSV demonstrates phasic flow, compresses and responds to augmentations from the saphenofemoral junction to the ankle with no evidence of thrombus. The great saphenous vein measures 5.9 mm at the saphenofemoral junction, 3.5 mm at the proximal thigh and 5.2 mm at the knee. The GSV is incompetent at the SFJ, the distal thigh, and the mid calf with the greatest reflux time of 1947 milliseconds.  The GSV gives rise to a varicose branch measuring 3.9 mm off the  Distal Thigh that courses Medial with a reflux time of 4998 milliseconds.          The AASV is competent ( 4.7 mm) draining into the saphenofemoral junction.      The Giacomini vein is incompetent ( 1.2 mm)  "communicating with the small saphenous vein at the knee level. The Giacomini vein is incompetent at the distal thigh and dives deep in the mid thigh.      The SSV demonstrates phasic flow, compresses and responds to augmentations from the popliteal space to the ankle.  No reflux or thrombus is seen. The saphenopopliteal junction is absent.      Perforators: there is no evidence of incompetent  veins at any level.      Lateral thigh varicose veins are noted measuring 4.3 mm with 6071 milliseconds of reflux. It was technically difficult to determine the source.         FINAL SUMMARY:  1.         No evidence of bilateral lower extremity deep vein thrombus.  2.         Right common femoral vein incompetence.   3.         Right greater saphenous vein incompetence.  4.         Right varicose vein incompetence.   5.         Left great saphenous vein incompetence.  6.         Left vein of giacomini incompetence.   7.         Left varicose vein incompetence.   8.         The time of incompetence is greater than 500 milliseconds in perforating and superficial veins and greater than 1000 milliseconds in deep veins.     Assessment:  Her limited superficial venous insufficiency is not contributing to the swelling of her lower extremities and does not need to be treated.  We discussed the recommendation to observe this limited insufficiency with compression, exercise, dietary measures and exercise.  Risks of complications from this limited venous insufficiency are minimal.    I will ask Dr. Randy Burnett to see her regarding her lipedema and left a message at his office in this regard.  The patient voiced understanding of our discussion and seems relieved that a diagnosis has been placed on her \"swollen legs.\"      Plan:  Appointment for work-up and treatment of her bilateral extremity lipedema.    Noel Carr MD    Dictated using Dragon voice recognition software which may result in transcription " errors

## 2021-12-30 NOTE — LETTER
12/30/2021         RE: Ana Luisa Rao  [address]  [facility] [id number]        Dear Colleague,    Thank you for referring your patient, Ana Luisa Rao, to the [facility]. Please see a copy of my visit note below.    Ana Luisa is a 61 year old who is being evaluated via a billable video visit.      How would you like to obtain your AVS? MyChart  If the video visit is dropped, the invitation should be resent by: Text to cell phone: [phone]  Will anyone else be joining your video visit? No      Video Start Time: 3:30 PM      Video-Visit Details    Type of service:  Video Visit    Video End Time:3:40 PM    Originating Location (pt. 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  She has varicose veins of her lateral left leg which appear to have enlarged over the last several years, therefore we obtained bilateral extremity venous Copsey studies to ensure that she did not have a significant contribution from venous insufficiency.  Please see my consultation of 12/29/2021 for details.  She returns today for bilateral lower extremity venous competency studies, the results of which we will discuss on today's video visit    Physical Exam  General: Pleasant female in no acute distress.  Blood pressure 138/84, pulse 82  Extremities: Classic findings of lipedema with bilateral ankle cutoff signs and no edema of her feet.  Soft tissue deposition on the proximal anteromedial legs bilaterally.  A few small varicose veins on the lateral left leg with scattered bilateral lower extremity telangiectasias and reticular veins.  No venous stasis changes.    Ultrasound:  INDICATION:  Patient is referred for varicose veins with pain.       EXAM TYPE  BILATERAL LOWER EXTREMITY VENOUS DUPLEX FOR VENOUS INSUFFICIENCY  TECHNICAL SUMMARY     A duplex ultrasound study using color flow was performed, to evaluate the bilateral lower extremity veins for valvular incompetence with the patient in a steep reversed trendelenberg.      RIGHT:     The deep veins demonstrate phasic flow, compress and respond to augmentations.  There is no DVT.  The common femoral vein is incompetent and free of thrombus. The remaining deep veins are competent and free of thrombus.      The GSV demonstrates phasic flow, compresses and responds to augmentations from the saphenofemoral junction to the ankle with no evidence of thrombus. The great saphenous vein measures 6.4 mm at the saphenofemoral junction, 5.0 mm at the proximal thigh, and 4.8 mm at the knee. The GSV is incompetent from Proximal Thigh to Mid Thigh, with the greatest reflux time of 5279 milliseconds.  The GSV gives rise to a varicose branch measuring 3.2 mm off the  Distal Thigh that courses Medial with a reflux time of 8050 milliseconds.      The AASV is not visualized.      The Giacomini vein is absent.     The SSV demonstrates phasic flow, compresses and responds to augmentations from the popliteal space to the ankle.  No reflux or thrombus is seen. The saphenopopliteal junction is absent.      Perforators: there is no evidence of incompetent  veins at any level.      LEFT:     The deep veins demonstrate phasic flow, compress and respond to augmentations.  There is no reflux or DVT.       The GSV demonstrates phasic flow, compresses and responds to augmentations from the saphenofemoral junction to the ankle with no evidence of thrombus. The great saphenous vein measures 5.9 mm at the saphenofemoral junction, 3.5 mm at the proximal thigh and 5.2 mm at the knee. The GSV is incompetent at the SFJ, the distal thigh, and the mid calf with the greatest reflux time of 1947 milliseconds.  The GSV gives rise  to a varicose branch measuring 3.9 mm off the  Distal Thigh that courses Medial with a reflux time of 4998 milliseconds.          The AASV is competent ( 4.7 mm) draining into the saphenofemoral junction.      The Giacomini vein is incompetent ( 1.2 mm) communicating with the small saphenous vein at the knee level. The Giacomini vein is incompetent at the distal thigh and dives deep in the mid thigh.      The SSV demonstrates phasic flow, compresses and responds to augmentations from the popliteal space to the ankle.  No reflux or thrombus is seen. The saphenopopliteal junction is absent.      Perforators: there is no evidence of incompetent  veins at any level.      Lateral thigh varicose veins are noted measuring 4.3 mm with 6071 milliseconds of reflux. It was technically difficult to determine the source.         FINAL SUMMARY:  1.         No evidence of bilateral lower extremity deep vein thrombus.  2.         Right common femoral vein incompetence.   3.         Right greater saphenous vein incompetence.  4.         Right varicose vein incompetence.   5.         Left great saphenous vein incompetence.  6.         Left vein of giacomini incompetence.   7.         Left varicose vein incompetence.   8.         The time of incompetence is greater than 500 milliseconds in perforating and superficial veins and greater than 1000 milliseconds in deep veins.     Assessment:  Her limited superficial venous insufficiency is not contributing to the swelling of her lower extremities and does not need to be treated.  We discussed the recommendation to observe this limited insufficiency with compression, exercise, dietary measures and exercise.  Risks of complications from this limited venous insufficiency are minimal.    I will ask Dr. Randy Burnett to see her regarding her lipedema and left a message at his office in this regard.  The patient voiced understanding of our discussion and seems relieved that a diagnosis  "has been placed on her \"swollen legs.\"      Plan:  Appointment for work-up and treatment of her bilateral extremity lipedema.    Noel Carr MD    Dictated using Dragon voice recognition software which may result in transcription errors              Again, thank you for allowing me to participate in the care of your patient.        Sincerely,        Noel Carr MD    "

## 2022-01-19 ENCOUNTER — APPOINTMENT (OUTPATIENT)
Dept: URGENT CARE | Facility: CLINIC | Age: 62
End: 2022-01-19
Payer: COMMERCIAL

## 2022-01-21 ENCOUNTER — OFFICE VISIT (OUTPATIENT)
Dept: PEDIATRICS | Facility: CLINIC | Age: 62
End: 2022-01-21
Payer: COMMERCIAL

## 2022-01-21 VITALS
BODY MASS INDEX: 38.82 KG/M2 | HEIGHT: 65 IN | DIASTOLIC BLOOD PRESSURE: 72 MMHG | TEMPERATURE: 97.3 F | OXYGEN SATURATION: 100 % | SYSTOLIC BLOOD PRESSURE: 118 MMHG | HEART RATE: 91 BPM | WEIGHT: 233 LBS | RESPIRATION RATE: 14 BRPM

## 2022-01-21 DIAGNOSIS — J06.9 VIRAL URI WITH COUGH: Primary | ICD-10-CM

## 2022-01-21 DIAGNOSIS — J04.0 LARYNGITIS: ICD-10-CM

## 2022-01-21 DIAGNOSIS — R06.2 WHEEZING: ICD-10-CM

## 2022-01-21 DIAGNOSIS — R05.9 COUGH: ICD-10-CM

## 2022-01-21 DIAGNOSIS — R09.81 NASAL CONGESTION: ICD-10-CM

## 2022-01-21 PROCEDURE — 99213 OFFICE O/P EST LOW 20 MIN: CPT | Performed by: NURSE PRACTITIONER

## 2022-01-21 RX ORDER — ALBUTEROL SULFATE 90 UG/1
2 AEROSOL, METERED RESPIRATORY (INHALATION) EVERY 6 HOURS
Qty: 18 G | Refills: 1 | Status: SHIPPED | OUTPATIENT
Start: 2022-01-21 | End: 2022-03-22

## 2022-01-21 ASSESSMENT — MIFFLIN-ST. JEOR: SCORE: 1628.79

## 2022-01-21 NOTE — PATIENT INSTRUCTIONS
I'm glad the COVID and flu tests were negative!  I would use Mucinex (guafenesin) and delsym for your cough  Push fluids  If your symptoms do not improve after 10-14 days, let me know and we could consider antibiotics but this is likely a virus.

## 2022-01-21 NOTE — PROGRESS NOTES
Assessment & Plan     Viral URI with cough  Wheezing  Laryngitis  Nasal congestion  Cough  Last neg COVID PCR test and influenza tests done 1/19/22. Suspect viral illness. Discussed supportive cares and OTC meds. Will trial inhaler for wheeze. Reassured lungs clear, afebrile and O2 sats stable.   - albuterol (PROAIR HFA/PROVENTIL HFA/VENTOLIN HFA) 108 (90 Base) MCG/ACT inhaler; Inhale 2 puffs into the lungs every 6 hours    Patient Instructions   I'm glad the COVID and flu tests were negative!  I would use Mucinex (guafenesin) and delsym for your cough  Push fluids  If your symptoms do not improve after 10-14 days, let me know and we could consider antibiotics but this is likely a virus.      Return in about 5 days (around 1/26/2022), or if symptoms worsen or fail to improve.    Crystal Sheridan NP  [facility] ZAHRA Orosco is a 61 year old who presents for the following health issues     HPI     Acute Illness  Acute illness concerns: cough  Onset/Duration:   Symptoms: 5 days  Fever: no  Chills/Sweats: no  Headache (location?): no  Sinus Pressure: no  Conjunctivitis:  no  Ear Pain: no  Rhinorrhea: YES  Congestion: YES  Sore Throat: no  Cough: YES-productive of green sputum  Wheeze: YES, when lying flat   Decreased Appetite: no  Nausea: no  Vomiting: no  Diarrhea: no  Dysuria/Freq.: no  Dysuria or Hematuria: no  Fatigue/Achiness: YES  Sick/Strep Exposure: no  Therapies tried and outcome: OTC meds help a bit (cough syrup)    Wednesday 1/19/22 neg COVID and flu tests  Has taken multiple COVID PCR tests in the past 1 week at work (works at [employer])   Denies any dyspnea, chest pain.  Has hx of wheeze, not associated with illness but had been seen by pulm for this in the past    Review of Systems   Constitutional, HEENT, cardiovascular, pulmonary, gi and gu systems are negative, except as otherwise noted.      Objective    /72 (BP Location: Right arm, Patient Position: Chair, Cuff  "Size: Adult Large)   Pulse 91   Temp 97.3  F (36.3  C) (Tympanic)   Resp 14   Ht 1.645 m (5' 4.75\")   Wt 105.7 kg (233 lb)   LMP 07/29/2013   SpO2 100%   BMI 39.07 kg/m    Body mass index is 39.07 kg/m .  Physical Exam   GENERAL: healthy, alert and no distress  EYES: Eyes grossly normal to inspection  HENT: ear canals and TM's normal, nose and mouth without ulcers or lesions  NECK: no adenopathy  RESP: lungs clear to auscultation - no rales, rhonchi or wheezes  CV: regular rate and rhythm, normal S1 S2, no S3 or S4, no murmur, click or rub              "

## 2022-03-10 ENCOUNTER — TELEPHONE (OUTPATIENT)
Dept: WOUND CARE | Facility: CLINIC | Age: 62
End: 2022-03-10
Payer: COMMERCIAL

## 2022-03-10 NOTE — TELEPHONE ENCOUNTER
Patient called with a referral from Dr. AMADOR Carr but she has no wound but lymphedema.   She wants an appointment with Dr. Burnett.   Please call Ana Luisa at .

## 2022-03-22 ENCOUNTER — HOSPITAL ENCOUNTER (OUTPATIENT)
Dept: WOUND CARE | Facility: CLINIC | Age: 62
Discharge: HOME | End: 2022-03-22
Attending: SURGERY | Admitting: SURGERY
Payer: COMMERCIAL

## 2022-03-22 VITALS
SYSTOLIC BLOOD PRESSURE: 153 MMHG | DIASTOLIC BLOOD PRESSURE: 83 MMHG | TEMPERATURE: 97 F | RESPIRATION RATE: 16 BRPM | HEART RATE: 84 BPM

## 2022-03-22 DIAGNOSIS — I89.0 LYMPHEDEMA: Primary | ICD-10-CM

## 2022-03-22 PROCEDURE — 99203 OFFICE O/P NEW LOW 30 MIN: CPT | Performed by: SURGERY

## 2022-03-22 PROCEDURE — G0463 HOSPITAL OUTPT CLINIC VISIT: HCPCS

## 2022-03-22 NOTE — PROGRESS NOTES
Patient arrived for wound care visit. Certified Wound Care Nurse time spent evaluating patient record, completed a full evaluation and documented wound(s) & elliott-wound skin; provided recommendation based on treatment plan. Applied dressing, reviewed discharge instructions, patient education, and discussed plan of care with appropriate medical team staff members and patient and/or family members.     No open wounds.  Seen by Dr. Burnett for a lymphedema consult.

## 2022-03-22 NOTE — PROGRESS NOTES
[facility] Progress Note    Subject: Ana Luisa Rao consultation with Dr. Carr for evaluation of lipedema, no wounds.  Medical record reviewed.  Chronic lymphedema with hypersensitivity in legs, symptomatic, lifestyle altering, changes to her daily activities, she has not seen a certified lymphedema therapist, she is tried compression which causes increased pain.  She initiated a micronized purified flavonoid fraction recently.  Denies history of deep venous thromboses.  No history of malignancy.  Does not utilize tobacco.  No history of peripheral arterial disease.    PMH:   Past Medical History:   Diagnosis Date     Benign essential tremor      Cervicalgia      Chronic rhinitis      Eczema      Esophageal Reflux/ Schatzke's ring/ PPI long term 7/17/2008     Fibromyalgia syndrome      Former smoker      GERD with esophagitis      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     Knee pain      Leg edema      Obesity      Oropharyngeal dysphagia      Palpitations      Peripheral venous insufficiency      Psoriasis      Pulmonary nodules 09/2018    per chest CT-In Care Everywhere     Reflux esophagitis      Restless legs syndrome      Sjogren's disease (H)      SOB (shortness of breath)      SVT (supraventricular tachycardia) (H)      Patient Active Problem List   Diagnosis     Neuropathic pain syndrome (non-herpetic)     Vitamin D deficiency     Allergic rhinitis     Esophageal Reflux/ Schatzke's ring/ PPI long term     Fibromyalgia     Posttraumatic stress disorder     Low back pain     Lumbar degenerative disc disease     ACP (advance care planning)     Foot pain, right     Morbid obesity (H)     Shortness of breath     Venous (peripheral) insufficiency     Palpitations     Supraventricular tachycardia (H)     Abnormal genetic test     Benign essential tremor     Lumbar radiculopathy     Inflammatory and toxic neuropathy (H)     Social Hx:   Social History      Socioeconomic History     Marital status:      Spouse name: Not on file     Number of children: Not on file     Years of education: Not on file     Highest education level: Not on file   Occupational History     Occupation:      Employer: [employer]   Tobacco Use     Smoking status: Former Smoker     Packs/day: 0.50     Years: 10.00     Pack years: 5.00     Types: Cigarettes     Start date:      Quit date: 2006     Years since quittin.2     Smokeless tobacco: Never Used     Tobacco comment: quit 2006   Vaping Use     Vaping Use: Never used   Substance and Sexual Activity     Alcohol use: Yes     Alcohol/week: 0.8 standard drinks     Types: 1 Standard drinks or equivalent per week     Comment: once a month      Drug use: No     Sexual activity: Yes     Partners: Male     Comment: vas   Other Topics Concern      Service Not Asked     Blood Transfusions Not Asked     Caffeine Concern Not Asked     Occupational Exposure Not Asked     Hobby Hazards Not Asked     Sleep Concern Not Asked     Stress Concern Not Asked     Weight Concern Not Asked     Special Diet Not Asked     Back Care Not Asked     Exercise No     Comment: intermittently     Bike Helmet Not Asked     Seat Belt Yes     Self-Exams Not Asked     Parent/sibling w/ CABG, MI or angioplasty before 65F 55M? Not Asked   Social History Narrative     Not on file     Social Determinants of Health     Financial Resource Strain: Medium Risk     Difficulty of Paying Living Expenses: Somewhat hard   Food Insecurity: No Food Insecurity     Worried About Running Out of Food in the Last Year: Never true     Ran Out of Food in the Last Year: Never true   Transportation Needs: No Transportation Needs     Lack of Transportation (Medical): No     Lack of Transportation (Non-Medical): No   Physical Activity: Unknown     Days of Exercise per Week: Patient refused     Minutes of Exercise per Session: 10 min   Stress: Stress Concern Present      Feeling of Stress : To some extent   Social Connections: Moderately Integrated     Frequency of Communication with Friends and Family: More than three times a week     Frequency of Social Gatherings with Friends and Family: More than three times a week     Attends Worship Services: 1 to 4 times per year     Active Member of Clubs or Organizations: No     Attends Club or Organization Meetings: Not on file     Marital Status:    Intimate Partner Violence: Not on file   Housing Stability: Low Risk      Unable to Pay for Housing in the Last Year: No     Number of Places Lived in the Last Year: 1     Unstable Housing in the Last Year: No       Surgical Hx:   Past Surgical History:   Procedure Laterality Date     CARPAL TUNNEL RELEASE RT/LT Right 12/01/2021    Right carpal tunnel release under local anesthetic, Dr. Dami Leigh, [facility]     ENDOSCOPY  2006, 2008    dysphagia     UPPER GI ENDOSCOPY  02/2013    GERD/ Schatzke's ring     UPPER GI ENDOSCOPY  04/13/2017    Schatzkies ring       Allergies:    Allergies   Allergen Reactions     Penicillins        Medications:   Current Outpatient Medications   Medication     amitriptyline (ELAVIL) 10 MG tablet     BENADRYL DECONGESTANT 25-60 MG OR TABS     clobetasol (TEMOVATE) 0.05 % cream     Dietary Management Product (VASCULERA) TABS     fluocinonide (LIDEX) 0.05 % external cream     fluticasone (FLONASE) 50 MCG/ACT nasal spray     MULTIVITAMIN OR     omeprazole (PRILOSEC) 20 MG DR capsule     polyethylene glycol 0.4%- propylene glycol 0.3% (SYSTANE ULTRA) 0.4-0.3 % SOLN ophthalmic solution     pregabalin (LYRICA) 50 MG capsule     TUMS 500 MG OR CHEW     No current facility-administered medications for this encounter.       Labs:   Recent Labs   Lab Test 06/18/21  1210   ALBUMIN 4.0   HGB 13.8   WBC 7.1   CRP <2.9     Creatinine   Date Value Ref Range Status   06/18/2021 0.62 0.52 - 1.04 mg/dL Final     GFR Estimate   Date Value Ref Range Status    06/18/2021 >90 >60 mL/min/[1.73_m2] Final     Comment:     Non  GFR Calc  Starting 12/18/2018, serum creatinine based estimated GFR (eGFR) will be   calculated using the Chronic Kidney Disease Epidemiology Collaboration   (CKD-EPI) equation.       GFR Estimate If Black   Date Value Ref Range Status   06/18/2021 >90 >60 mL/min/[1.73_m2] Final     Comment:      GFR Calc  Starting 12/18/2018, serum creatinine based estimated GFR (eGFR) will be   calculated using the Chronic Kidney Disease Epidemiology Collaboration   (CKD-EPI) equation.       Lab Results   Component Value Date    WBC 7.1 06/18/2021     Lab Results   Component Value Date    RBC 4.64 06/18/2021     Lab Results   Component Value Date    HGB 13.8 06/18/2021     Lab Results   Component Value Date    HCT 42.9 06/18/2021     No components found for: MCT  Lab Results   Component Value Date    MCV 93 06/18/2021     Lab Results   Component Value Date    MCH 29.7 06/18/2021     Lab Results   Component Value Date    MCHC 32.2 06/18/2021     Lab Results   Component Value Date    RDW 12.1 06/18/2021     Lab Results   Component Value Date     06/18/2021          Nutrition requirements were discussed with patient today.  Objective:  BP (!) 153/83 (BP Location: Left arm)   Pulse 84   Temp 97  F (36.1  C) (Temporal)   Resp 16   LMP 07/29/2013         General:  Patient is alert and orientated, no acute distress.  Conversant    Vascular: Palpable pedal pulses.  Classic changes of lipedema with toe and foot sparing, callus at ankles with significant lymphedema changes at the calves, abnormal fat deposition.  Hypersensitivity to light touch        Impression: R 60.9, lipedema      Plan: Micronized purified flavonoid fraction, recommended L formula 1 tablet twice daily for 3 months then 1 tablet daily lifelong, in addition to B12, B6, folate, vitamin D at 5000 international units daily, high-dose vitamin C and form of  bioflavonoid, Griselda-C, 2 L of water on a daily basis, utilization of EdemaWear at night on the thighs and calves, evaluation for lymphedema pump, certified lymphedema therapist evaluation, liposuction consultation with Dr[facility]essa at [facility].  We will follow-up as needed.         Further instructions from your care team       Medications/supplements to aid in healin. Vitamin D3 5,000 iu per day  2. Griselda C 1,000 mg take twice daily  3. Vitamin B Complex with folic acid take 1 tablet daily   4. Vitamin B 12 1000 mcg daily  5. Vein Formula 500mg capsule twice daily order from www.[employer] or call [phone]. Alternatively, if Vein Formula is not available Hesperidin Diosmin 1000mg could be ordered from [employer]. Deondre Barton are good brands.  -L Formula: 1 capsule twice daily for three months, then once daily for life.    Increase fluids to two liters of water per day and stop drinking soda.    Recommend stopping Vasculera    Follow up with Dr. Vanessa, plastic surgery [address] in [place]. The phone number: [phone]. Fax number: [phone]    Referral to Certified Lymphedema Therapist; they will be calling you  Then, see [facility] for custom compression equipment    Compression:  Orange Stripe EdemaWear (4 packs) for the thighs  Purple Stripe EdemaWear (4 packs) for the calfs     HAMMAD Burnett M.D. 2022    Call us at [phone] if you have any questions about your wounds, have redness or swelling around your wound, have a fever of 101 or greater or if you have any other problems or concerns. We answer the phone Monday through Friday 8 am to 4 pm, please leave a message as we check the voicemail frequently throughout the day.     If you had a positive experience please indicate on your patient satisfaction survey form that [facility] will be sending you.    If you have any billing related questions please call the [facility]  Business office at [phone]. The clinic staff does not handle billing related matters.          Francis Burnett MD on 3/22/2022 at 9:55 AM        Dictated using Dragon voice recognition software which may result in transcription errors

## 2022-03-22 NOTE — DISCHARGE INSTRUCTIONS
Medications/supplements to aid in healin. Vitamin D3 5,000 iu per day  2. Griselda C 1,000 mg take twice daily  3. Vitamin B Complex with folic acid take 1 tablet daily   4. Vitamin B 12 1000 mcg daily  5. Vein Formula 500mg capsule twice daily order from [email or web address] or call [phone]. Alternatively, if Vein Formula is not available Hesperidin Diosmin 1000mg could be ordered from [employer]. Deondre Barton are good brands.  -L Formula: 1 capsule twice daily for three months, then once daily for life.    Increase fluids to two liters of water per day and stop drinking soda.    Recommend stopping Vasculera    Referral top Dr. Vanessa, Plastic Surgery [address] in [place]. The phone number: [phone]. Fax number: [phone]    Compression:  Referral to Certified Lymphedema Therapist; they will be calling you  See [facility] for custom compression equipment, which you would wear during the day    Compression:  EdemaWear at night  Lane Stripe EdemaWear (4 packs) for the thighs  Purple Stripe EdemaWear (4 packs) for the calfs     HAMMAD Burnett M.D. 2022    Call us at [phone] if you have any questions about your wounds, have redness or swelling around your wound, have a fever of 101 or greater or if you have any other problems or concerns. We answer the phone Monday through Friday 8 am to 4 pm, please leave a message as we check the voicemail frequently throughout the day.     If you had a positive experience please indicate on your patient satisfaction survey form that [facility] will be sending you.    If you have any billing related questions please call the [facility] Business office at [phone]. The clinic staff does not handle billing related matters.

## 2022-03-23 ENCOUNTER — HOSPITAL ENCOUNTER (OUTPATIENT)
Dept: MAMMOGRAPHY | Facility: CLINIC | Age: 62
Discharge: HOME | End: 2022-03-23
Attending: INTERNAL MEDICINE | Admitting: INTERNAL MEDICINE
Payer: COMMERCIAL

## 2022-03-23 DIAGNOSIS — Z12.31 VISIT FOR SCREENING MAMMOGRAM: ICD-10-CM

## 2022-03-23 PROCEDURE — 77067 SCR MAMMO BI INCL CAD: CPT

## 2022-04-10 ENCOUNTER — OFFICE VISIT (OUTPATIENT)
Dept: URGENT CARE | Facility: URGENT CARE | Age: 62
End: 2022-04-10
Payer: COMMERCIAL

## 2022-04-10 VITALS
DIASTOLIC BLOOD PRESSURE: 82 MMHG | TEMPERATURE: 98.6 F | HEART RATE: 101 BPM | BODY MASS INDEX: 39.54 KG/M2 | WEIGHT: 235.8 LBS | RESPIRATION RATE: 16 BRPM | OXYGEN SATURATION: 98 % | SYSTOLIC BLOOD PRESSURE: 156 MMHG

## 2022-04-10 DIAGNOSIS — R07.0 THROAT PAIN: ICD-10-CM

## 2022-04-10 DIAGNOSIS — R05.3 CHRONIC COUGH: Primary | ICD-10-CM

## 2022-04-10 LAB — DEPRECATED S PYO AG THROAT QL EIA: NEGATIVE

## 2022-04-10 PROCEDURE — 99214 OFFICE O/P EST MOD 30 MIN: CPT | Performed by: INTERNAL MEDICINE

## 2022-04-10 PROCEDURE — 87651 STREP A DNA AMP PROBE: CPT | Performed by: INTERNAL MEDICINE

## 2022-04-11 ENCOUNTER — MYC MEDICAL ADVICE (OUTPATIENT)
Dept: PEDIATRICS | Facility: CLINIC | Age: 62
End: 2022-04-11
Payer: COMMERCIAL

## 2022-04-11 DIAGNOSIS — R05.3 CHRONIC COUGH: Primary | ICD-10-CM

## 2022-04-11 LAB — GROUP A STREP BY PCR: NOT DETECTED

## 2022-04-11 NOTE — PROGRESS NOTES
"  Assessment & Plan     Chronic cough  The patient presents with an exacerbation of chronic cough that always gets worse when she has URI.  She chronically produces relatively large amounts of a \"clear fluid\" which she will cough up and also notices some positional wheeze and notices some possible concerns with pharyngeal phase dysphagia.  Both subjectively and objectively, the upper airway near the larynx does seem to be the primary site of the problem and either a functional or anatomic abnormality of the hypopharynx and/or larynx may be most likely to be at play.  This leads to a differential diagnosis that includes chronic post-nasal drainage, reflux laryngopharyngitis, Zenker's diverticulum, laryngeal aspiration and chronic laryngotracheitis on that basis.  She does not have evidence of a current bacterial infection.  She does not have evidence of lower respiratory tract disease. No evidence of pulmonary edema as a contributing factor.  From a symptom management perspective will see if the combination of antihistamine and anticholinergic effects associated with diphenhydramine can be of benefit  Cetirizine in the AM.  Because of the chronicity of this problem, I have suggested that she follow-up with her PCP, Dr. Saenz, to determine if ENT referral would be helpful.  Relative to possible reflux laryngitis and the role of PPI, we discussed the controversy around that diagnosis and the challenges with interpreting response to PPI in either a positive or negative light.  A pH probe might be helpful to pin this down.  Finally, might she benefit from a swallowing evaluation?      Throat pain  - Streptococcus A Rapid Screen w/Reflex to PCR - Clinic Collect  - Group A Streptococcus PCR Throat Swab    Dimitry Teran MD  [facility]    Subjective     HPI   Complaining of respiratory symptoms. She notes that the illness started with bilateral eyelid irritation and mattering that has resolved " "but progressed to sore throat and now cough.  Cough seems to be productive of a clear sputum that is much worse now but is present to some extent chronically for several years.  Notes that she will feel as if she is just accumulating a volume of \"slime\" in her throat then will have to go and \"hack up\" a large volume of clear fluid.  Doesn't get undigested food..  Has some tendency to wheezing.  Over the past week, she has had an exacerbation of symptoms with an intercurrent URI.  Temp has been lingering between 100 and 102. Today she has not had fever.  She doesn't have too much shortness of breath.   She is on omeprazole 20 mg daily for GERD and had tried increasing to 40 mg daily.  She doesn't notice any improvement with PPI.  Hasn't used antihistamine.  The wheeze tends to be louder and more notable when supine and she states that things are \"quite musical\" when she is supine.  Worked up at [facility] in the past with a negative methacholine challenge and bronchodilators have been of no benefit.  Had a laryngoscopy which was non-diagnostic related to her laryngospasm event.      PMH: laryngospasm    Review of Systems   Constitutional, HEENT, cardiovascular, pulmonary, gi and gu systems are negative, except as otherwise noted.      Objective    BP (!) 156/82   Pulse 101   Temp 98.6  F (37  C)   Resp 16   Wt 99.8 kg (220 lb)   LMP 07/29/2013   SpO2 98%   BMI 36.89 kg/m    Body mass index is 36.89 kg/m .     Wt Readings from Last 4 Encounters:   04/10/22 107 kg (235 lb 12.8 oz)   01/21/22 105.7 kg (233 lb)   11/23/21 106.1 kg (233 lb 14.4 oz)   06/18/21 101.2 kg (223 lb)       Physical Exam   GENERAL APPEARANCE: adult female, overtly healthy  HENT: ear canals and TM's normal and crowded posterior pharynx without acute erythema or exudate  NECK: no adenopathy, no asymmetry, masses, or scars and thyroid normal to palpation  RESP: when upright, her lungs are clear to auscultation posteriorly without wheeze or " rhonchi and normal I:E ratio; when supine, she develops an expiratory wheeze that is heard centrally over the trachea (upper sternum) but not in the periphery; hacking cough is exacerbated while lying supine  CV: regular rates and rhythm, normal S1 S2, no S3 or S4 and no murmur, click or rub  ABDOMEN: soft, nontender, without hepatosplenomegaly or masses and bowel sounds normal    Results for orders placed or performed in visit on 04/10/22   Streptococcus A Rapid Screen w/Reflex to PCR - Clinic Collect     Status: Normal    Specimen: Throat; Swab   Result Value Ref Range    Group A Strep antigen Negative Negative

## 2022-04-11 NOTE — TELEPHONE ENCOUNTER
"Dr Saenz: See pt message and advise.    Per  visit:  Because of the chronicity of this problem, I have suggested that she follow-up with her PCP, Dr. Saenz, to determine if ENT referral would be helpful.  Relative to possible reflux laryngitis and the role of PPI, we discussed the controversy around that diagnosis and the challenges with interpreting response to PPI in either a positive or negative light.  A pH probe might be helpful to pin this down.  Finally, might she benefit from a swallowing evaluation?      - Morro \"Lucas\" Cait (he/him/his), RN - Patient Advocate Liason (PAL)  [facility]    "

## 2022-04-11 NOTE — PATIENT INSTRUCTIONS
"Try diphenhydramine (Benadryl) 50 mg at bedtime to dry up mucus and secretions and cetirizine (Zyrtec) 10 mg in the morning.    This would be worth discussing with Dr. Saenz.  Possible questions: could this be related to swallowing dysfunction in your pharynx?  Might you have some degree of \"aspiration\" where you are chronically irritating the vocal cords?  A swallow study could help figure this out.  Another thought is whether you might have a diverticulum in the pharynx however I don't think this is as likely.     An ENT doctor could be helpful.   "

## 2022-04-20 ENCOUNTER — MYC MEDICAL ADVICE (OUTPATIENT)
Dept: PEDIATRICS | Facility: CLINIC | Age: 62
End: 2022-04-20
Payer: COMMERCIAL

## 2022-04-21 NOTE — TELEPHONE ENCOUNTER
Should do in-person visit today - UC or extender.  Hard to know from this if has had flu all along or if this is new infection.  Could have pneumonia even though last CXR was normal.

## 2022-04-21 NOTE — TELEPHONE ENCOUNTER
Please review [employer] message.    Patient was seen in  on 4/10 and 4/13 for cough. Tested negative for strep and had clear chest x-ray on 4/13. Patient was prescribed RobitussinAC and azithromycin. Felt some improvement with azithromycin but symptoms returned after finishing prescription.     Patient states she then went to a drive thru testing site for Covid. Per patient, COVID was negative but tested positive for influenza. Has been sick for 15 days. Ok to suggest E-Visit to possibly prescribe tamiflu or would you want patient seen again?    Thanks!  Mary CASTILLO RN, BSN

## 2022-04-21 NOTE — TELEPHONE ENCOUNTER
Tried to call patient. Mailbox full and unable to leave a message. Sent [employer] message.  Mary CASTILLO RN, BSN

## 2022-05-04 ENCOUNTER — HOSPITAL ENCOUNTER (OUTPATIENT)
Dept: OCCUPATIONAL THERAPY | Facility: CLINIC | Age: 62
Setting detail: THERAPIES SERIES
Discharge: HOME | End: 2022-05-04
Attending: SURGERY
Payer: COMMERCIAL

## 2022-05-04 DIAGNOSIS — I89.0 LYMPHEDEMA OF BOTH LOWER EXTREMITIES: Primary | ICD-10-CM

## 2022-05-04 DIAGNOSIS — I89.0 LYMPHEDEMA: ICD-10-CM

## 2022-05-04 PROCEDURE — 97140 MANUAL THERAPY 1/> REGIONS: CPT | Mod: GO | Performed by: OCCUPATIONAL THERAPIST

## 2022-05-04 PROCEDURE — 97166 OT EVAL MOD COMPLEX 45 MIN: CPT | Mod: GO | Performed by: OCCUPATIONAL THERAPIST

## 2022-05-05 ENCOUNTER — MYC MEDICAL ADVICE (OUTPATIENT)
Dept: PEDIATRICS | Facility: CLINIC | Age: 62
End: 2022-05-05
Payer: COMMERCIAL

## 2022-05-05 ENCOUNTER — TELEPHONE (OUTPATIENT)
Dept: PEDIATRICS | Facility: CLINIC | Age: 62
End: 2022-05-05
Payer: COMMERCIAL

## 2022-05-05 DIAGNOSIS — M79.2 NEUROPATHIC PAIN SYNDROME (NON-HERPETIC): ICD-10-CM

## 2022-05-05 NOTE — TELEPHONE ENCOUNTER
Has she been taking it?  Should have run out the end of January.  Is she taking fewer tabs a day?

## 2022-05-05 NOTE — PROGRESS NOTES
05/04/22 1252   Rehab Discipline   Discipline OT   Type of Visit   Type of visit Initial Edema Evaluation       present No   General Information   Start of care 05/04/22   Referring physician Dr. Burnett   Orders Evaluate and treat as indicated   Order date 03/22/22   Medical diagnosis Llymphedema   Onset of illness / date of surgery 03/22/22   Edema onset 05/04/12   Affected body parts LLE;RLE;RUE;LUE   Edema etiology   (Lipedema)   Pertinent history of current problem (PT: include personal factors and/or comorbidities that impact the POC; OT: include additional occupational profile info) neuropathy, sjogrens, fibromyalgia, LBP   Surgical / medical history reviewed Yes   Prior level of functional mobility Pt lives at home independently. She has always had difficulty with standing and end of day tasks as it elevates her pain ie cleaning, baking.   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 No   Have you fallen and had an injury in the past year? No   Is patient a fall risk? No   Abuse Screen (yes response referral indicated)   Feels Unsafe at Home or Work/School no   Feels Threatened by Someone no   Does Anyone Try to Keep You From Having Contact with Others or Doing Things Outside Your Home? no   Physical Signs of Abuse Present no   System Outcome Measures   Outcome Measures Lymphedema   Lymphedema Life Impact Scale (score range 0-72). A higher score indicates greater impairment. 50   Subjective Report   Patient report of symptoms Pt has high pain to touch and end of day, not getting what she wants to do accomplished.  She cannot abdirahman the compression socks she has tried in the past.   Her pain worsens the more activity she needs to do.  She has difficultly finding boots or pants to fit.  She cannot squat to work in her garden.   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   Pain   Patient currently in pain Yes   Pain location B legs   Pain rating 6/10   Pain description Ache   Pain description comment end of day, current 0/10 and to touch 3/10.  She rpeorts weekend pain and days of activity (previous job) 10/10   Cognitive Status   Orientation Orientation to person, place and time   Level of consciousness Alert   Follows commands and answers questions 100% of the time   Personal safety and judgement Intact   Memory Intact   Edema Exam / Assessment   Skin condition Non-pitting;Dryness;Intact   Skin condition comments Pt has fibrosis from ankle to knee crease posteriorly   Scar No   Capillary refill Symmetrical   Dorsal pedal pulse Symmetrical   Stemmer sign Positive   Stemmer sign comments in BLE   Ulceration No   Girth Measurements   Girth Measurements Refer to separate girth measurement flowsheet   Volume LE   Right LE (mL) 22965   Left LE (mL) 61684   LE volume comparison RLE volume greater than LLE volume   % difference 5.5%   Range of Motion   ROM Other   ROM comments Pt locks kneesin hyperextension   Strength   Strength Other   Strength comments Pt reports not being as active as she'd like due to pain.   Posture   Posture Normal   Palpation   Palpation Pt has pain on palpation.   Activities of Daily Living   Activities of Daily Living Pt is indpeendent in ADLs, but does not get as much done in a day due to high pain.   Bed Mobility   Bed mobility Pt is indepedent with bed mobility.   Transfers   Transfers Pt is indpeendnet with transfers.   Gait / Locomotion   Gait / Locomotion Pt is independent to walk, but has great pain with more activity (  standing is worse than walking)   Sensory   Sensory perception Light touch   Light touch Impaired   Sensory perception comments neuropathy   Vascular Assessment   Vascular Assessment Comments was seeing vascular   Coordination   Coordination Gross motor coordination appropriate   Muscle Tone   Muscle tone No deficits were identified   Planned Edema Interventions   Planned edema interventions Manual lymph drainage;Gradient compression bandaging;Fit for compression garment;Exercises;Precautions to prevent infection / exacerbation;Education;Manual therapy;ADL training;Skin care / precautions;Home management program development   Clinical Impression   Criteria for skilled therapeutic intervention met Yes   Therapy diagnosis Lymphedema/lipedema   Influenced by the following impairments / conditions Edema;Stage 2;Lipolymphedema   Assessment of Occupational Performance 5 or more Performance Deficits   Identified Performance Deficits Pain,  standing tasks - cleaning, baking, her previous job, pants fitting, boots fitting   Clinical Decision Making (Complexity) Moderate complexity   Treatment Frequency 2x/week   Treatment duration 5 weeks   Patient / family and/or staff in agreement with plan of care Yes   Risks and benefits of therapy have been explained Yes   Clinical impression comments Recommneded intensive therapy 5x/week, but pt unable to wear GCB to work,  so will start with a strong home program and look for reduction.   Education Assessment   Preferred learning style Listening;Reading;Demonstration;Pictures / video   Barriers to learning No barriers   Goals   Edema Eval Goals 1;2;3;4;5;6   Goal 1   Goal identifier 1   Goal description In order to improve functional mobility for activities of living, by the completion of intensive treatment, patient and/or caregiver will;   Verbalize and/or demonstrate understanding of techniques to independently manage their lymphedema at home   Target date 06/24/22   Goal 2   Goal  identifier 1a   Goal description tolerate gradient compression bandaging/wearing compression garments 23 hrs/day to decrease volume of extracellular fluid   Target date 06/24/22   Goal 3   Goal identifier 1b   Goal description demonstrate independence in applying gradient compression bandages   Target date 06/24/22   Goal 4   Goal identifier 1c   Goal description demonstrate independence in performing prescribed exercises to facilate the lymph system   Target date 06/24/22   Goal 5   Goal identifier 1d   Goal description be independent in donning/doffing, wearing schedule, and care of compression garments   Target date 06/24/22   Goal 6   Goal description Pt will decrease pain in the LEs to 2/10 ( not to touch) with 4 hours of standing in the kitchen.   Target date 06/24/22   Total Evaluation Time   OT Eval, Moderate Complexity Minutes (90234) 45

## 2022-05-06 ENCOUNTER — TRANSFERRED RECORDS (OUTPATIENT)
Dept: FAMILY MEDICINE | Facility: CLINIC | Age: 62
End: 2022-05-06

## 2022-05-06 RX ORDER — PREGABALIN 50 MG/1
50-100 CAPSULE ORAL AT BEDTIME
Qty: 180 CAPSULE | Refills: 1 | Status: SHIPPED | OUTPATIENT
Start: 2022-05-06 | End: 2023-01-17

## 2022-06-30 ENCOUNTER — TRANSFERRED RECORDS (OUTPATIENT)
Dept: HEALTH INFORMATION MANAGEMENT | Facility: CLINIC | Age: 62
End: 2022-06-30

## 2022-07-26 DIAGNOSIS — K22.2 SCHATZKI'S RING: ICD-10-CM

## 2022-07-28 NOTE — TELEPHONE ENCOUNTER
Prescription approved per [facility] Refill Protocol.    Alexus Rivera RN on 7/28/2022 at 11:51 AM

## 2022-08-08 ENCOUNTER — TELEPHONE (OUTPATIENT)
Dept: WOUND CARE | Facility: CLINIC | Age: 62
End: 2022-08-08

## 2022-08-08 NOTE — TELEPHONE ENCOUNTER
Patient was last seen by Dr Burnett in March of 2022. Since then she has completed the recommendations by Dr Burnett and is now in need of custom made compression stockings and a lymph pump from [facility]. She was told she'll need an order or prescription. She also needs the diagnosis and procedure code for the insurance.     [phone]

## 2022-08-10 NOTE — TELEPHONE ENCOUNTER
Will plan to make a return appointment with Dr Burnett for measurements for Lymph Pump and stockings.   Routing to [facility] .

## 2022-08-17 NOTE — TELEPHONE ENCOUNTER
Called a second and final time. No answer and a voicemail was left for the patient to call the clinic at her convenience.

## 2022-08-24 ENCOUNTER — MEDICAL CORRESPONDENCE (OUTPATIENT)
Dept: HEALTH INFORMATION MANAGEMENT | Facility: CLINIC | Age: 62
End: 2022-08-24

## 2022-08-25 NOTE — TELEPHONE ENCOUNTER
Missed call re compression stockings and lymph pump.      States she was measured yesterday, 8/24, at [facility] and is measured at every PT appointment.  Per [facility], they have not received a prescription.      Phone: [phone], ok to leave message.  She is at work, may not be able to answer until after 3:30.

## 2022-08-25 NOTE — TELEPHONE ENCOUNTER
Patient left voice message, still needing prescription for hose and lymph pump.  Left her a message advising we need her to come in and be measured in order to write the prescription.

## 2022-08-25 NOTE — TELEPHONE ENCOUNTER
Fax received from [facility] for prescription for compression socks. Dr. Burnett will sign and fax back. Call placed to patient to inform her of this. No further questions or concerns.

## 2022-10-15 ENCOUNTER — HEALTH MAINTENANCE LETTER (OUTPATIENT)
Age: 62
End: 2022-10-15

## 2022-10-18 ENCOUNTER — TRANSFERRED RECORDS (OUTPATIENT)
Dept: HEALTH INFORMATION MANAGEMENT | Facility: CLINIC | Age: 62
End: 2022-10-18

## 2022-12-16 DIAGNOSIS — M79.7 FIBROMYALGIA: ICD-10-CM

## 2022-12-16 DIAGNOSIS — M79.604 PAIN IN BOTH LOWER EXTREMITIES: ICD-10-CM

## 2022-12-16 DIAGNOSIS — M79.2 NEUROPATHIC PAIN SYNDROME (NON-HERPETIC): ICD-10-CM

## 2022-12-16 DIAGNOSIS — M79.605 PAIN IN BOTH LOWER EXTREMITIES: ICD-10-CM

## 2022-12-16 RX ORDER — AMITRIPTYLINE HYDROCHLORIDE 10 MG/1
TABLET ORAL
Qty: 90 TABLET | Refills: 0 | Status: SHIPPED | OUTPATIENT
Start: 2022-12-16 | End: 2023-01-17

## 2022-12-16 NOTE — TELEPHONE ENCOUNTER
Routing refill request to provider for review/approval because:  Failing bp    Jodi Simmons, RN

## 2023-01-12 NOTE — TELEPHONE ENCOUNTER
Attempted to reach patient by phone and call went directly to voicemail. Mailbox is full.      Need to find out the status of MRI ordered by Dr. Ramírez.     Will try again later and sent [employer] message.     JONATHAN Mondragon RN     Arava Counseling:  Patient counseled regarding adverse effects of Arava including but not limited to nausea, vomiting, abnormalities in liver function tests. Patients may develop mouth sores, rash, diarrhea, and abnormalities in blood counts. The patient understands that monitoring is required including LFTs and blood counts.  There is a rare possibility of scarring of the liver and lung problems that can occur when taking methotrexate. Persistent nausea, loss of appetite, pale stools, dark urine, cough, and shortness of breath should be reported immediately. Patient advised to discontinue Arava treatment and consult with a physician prior to attempting conception. The patient will have to undergo a treatment to eliminate Arava from the body prior to conception.

## 2023-01-17 ENCOUNTER — OFFICE VISIT (OUTPATIENT)
Dept: PEDIATRICS | Facility: CLINIC | Age: 63
End: 2023-01-17
Payer: COMMERCIAL

## 2023-01-17 VITALS
TEMPERATURE: 97.7 F | DIASTOLIC BLOOD PRESSURE: 82 MMHG | RESPIRATION RATE: 16 BRPM | SYSTOLIC BLOOD PRESSURE: 150 MMHG | HEIGHT: 65 IN | WEIGHT: 235.1 LBS | HEART RATE: 74 BPM | BODY MASS INDEX: 39.17 KG/M2 | OXYGEN SATURATION: 100 %

## 2023-01-17 DIAGNOSIS — M79.7 FIBROMYALGIA: ICD-10-CM

## 2023-01-17 DIAGNOSIS — M79.605 PAIN IN BOTH LOWER EXTREMITIES: ICD-10-CM

## 2023-01-17 DIAGNOSIS — G47.33 OSA (OBSTRUCTIVE SLEEP APNEA): ICD-10-CM

## 2023-01-17 DIAGNOSIS — I10 PRIMARY HYPERTENSION: ICD-10-CM

## 2023-01-17 DIAGNOSIS — K22.2 SCHATZKI'S RING: ICD-10-CM

## 2023-01-17 DIAGNOSIS — J30.89 NON-SEASONAL ALLERGIC RHINITIS, UNSPECIFIED TRIGGER: ICD-10-CM

## 2023-01-17 DIAGNOSIS — M79.2 NEUROPATHIC PAIN SYNDROME (NON-HERPETIC): ICD-10-CM

## 2023-01-17 DIAGNOSIS — M70.62 TROCHANTERIC BURSITIS OF BOTH HIPS: ICD-10-CM

## 2023-01-17 DIAGNOSIS — N76.1 CHRONIC VAGINITIS: ICD-10-CM

## 2023-01-17 DIAGNOSIS — M79.604 PAIN IN BOTH LOWER EXTREMITIES: ICD-10-CM

## 2023-01-17 DIAGNOSIS — Z13.220 SCREENING FOR LIPID DISORDERS: ICD-10-CM

## 2023-01-17 DIAGNOSIS — Z13.1 SCREENING FOR DIABETES MELLITUS: ICD-10-CM

## 2023-01-17 DIAGNOSIS — M70.61 TROCHANTERIC BURSITIS OF BOTH HIPS: ICD-10-CM

## 2023-01-17 DIAGNOSIS — R60.9 LIPEDEMA: ICD-10-CM

## 2023-01-17 DIAGNOSIS — Z00.00 ROUTINE GENERAL MEDICAL EXAMINATION AT A HEALTH CARE FACILITY: Primary | ICD-10-CM

## 2023-01-17 DIAGNOSIS — L30.9 DERMATITIS: ICD-10-CM

## 2023-01-17 LAB
CLUE CELLS: ABNORMAL
HBA1C MFR BLD: 5.6 % (ref 0–5.6)
TRICHOMONAS, WET PREP: ABNORMAL
WBC'S/HIGH POWER FIELD, WET PREP: ABNORMAL
YEAST, WET PREP: ABNORMAL

## 2023-01-17 PROCEDURE — 80061 LIPID PANEL: CPT | Performed by: INTERNAL MEDICINE

## 2023-01-17 PROCEDURE — 87210 SMEAR WET MOUNT SALINE/INK: CPT | Performed by: INTERNAL MEDICINE

## 2023-01-17 PROCEDURE — 83036 HEMOGLOBIN GLYCOSYLATED A1C: CPT | Performed by: INTERNAL MEDICINE

## 2023-01-17 PROCEDURE — 99396 PREV VISIT EST AGE 40-64: CPT | Performed by: INTERNAL MEDICINE

## 2023-01-17 PROCEDURE — 99213 OFFICE O/P EST LOW 20 MIN: CPT | Mod: 25 | Performed by: INTERNAL MEDICINE

## 2023-01-17 PROCEDURE — 84443 ASSAY THYROID STIM HORMONE: CPT | Performed by: INTERNAL MEDICINE

## 2023-01-17 PROCEDURE — 80048 BASIC METABOLIC PNL TOTAL CA: CPT | Performed by: INTERNAL MEDICINE

## 2023-01-17 PROCEDURE — 36415 COLL VENOUS BLD VENIPUNCTURE: CPT | Performed by: INTERNAL MEDICINE

## 2023-01-17 RX ORDER — BETAMETHASONE DIPROPIONATE 0.5 MG/G
CREAM TOPICAL 2 TIMES DAILY
COMMUNITY
End: 2023-01-17

## 2023-01-17 RX ORDER — BETAMETHASONE DIPROPIONATE 0.5 MG/G
CREAM TOPICAL 2 TIMES DAILY
Qty: 15 G | Refills: 2 | Status: SHIPPED | OUTPATIENT
Start: 2023-01-17 | End: 2024-01-09

## 2023-01-17 RX ORDER — PREGABALIN 50 MG/1
50-100 CAPSULE ORAL AT BEDTIME
Qty: 180 CAPSULE | Refills: 3 | Status: SHIPPED | OUTPATIENT
Start: 2023-01-17 | End: 2024-01-09

## 2023-01-17 RX ORDER — AMITRIPTYLINE HYDROCHLORIDE 10 MG/1
TABLET ORAL
Qty: 90 TABLET | Refills: 3 | Status: SHIPPED | OUTPATIENT
Start: 2023-01-17 | End: 2024-01-09

## 2023-01-17 SDOH — ECONOMIC STABILITY: FOOD INSECURITY: WITHIN THE PAST 12 MONTHS, THE FOOD YOU BOUGHT JUST DIDN'T LAST AND YOU DIDN'T HAVE MONEY TO GET MORE.: NEVER TRUE

## 2023-01-17 SDOH — HEALTH STABILITY: PHYSICAL HEALTH: ON AVERAGE, HOW MANY DAYS PER WEEK DO YOU ENGAGE IN MODERATE TO STRENUOUS EXERCISE (LIKE A BRISK WALK)?: 1 DAY

## 2023-01-17 SDOH — ECONOMIC STABILITY: INCOME INSECURITY: HOW HARD IS IT FOR YOU TO PAY FOR THE VERY BASICS LIKE FOOD, HOUSING, MEDICAL CARE, AND HEATING?: NOT VERY HARD

## 2023-01-17 SDOH — HEALTH STABILITY: PHYSICAL HEALTH: ON AVERAGE, HOW MANY MINUTES DO YOU ENGAGE IN EXERCISE AT THIS LEVEL?: 20 MIN

## 2023-01-17 SDOH — ECONOMIC STABILITY: FOOD INSECURITY: WITHIN THE PAST 12 MONTHS, YOU WORRIED THAT YOUR FOOD WOULD RUN OUT BEFORE YOU GOT MONEY TO BUY MORE.: NEVER TRUE

## 2023-01-17 SDOH — ECONOMIC STABILITY: INCOME INSECURITY: IN THE LAST 12 MONTHS, WAS THERE A TIME WHEN YOU WERE NOT ABLE TO PAY THE MORTGAGE OR RENT ON TIME?: NO

## 2023-01-17 ASSESSMENT — ENCOUNTER SYMPTOMS
EYE PAIN: 0
CONSTIPATION: 0
DYSURIA: 0
PARESTHESIAS: 0
FEVER: 0
DIZZINESS: 1
ARTHRALGIAS: 1
JOINT SWELLING: 0
BREAST MASS: 1
COUGH: 1
ABDOMINAL PAIN: 0
NERVOUS/ANXIOUS: 0
HEARTBURN: 1
FREQUENCY: 0
HEMATURIA: 0
MYALGIAS: 1
HEMATOCHEZIA: 0
HEADACHES: 0
WEAKNESS: 0
PALPITATIONS: 0
CHILLS: 0
NAUSEA: 1
SHORTNESS OF BREATH: 0
SORE THROAT: 0
DIARRHEA: 0

## 2023-01-17 ASSESSMENT — SOCIAL DETERMINANTS OF HEALTH (SDOH)
IN A TYPICAL WEEK, HOW MANY TIMES DO YOU TALK ON THE PHONE WITH FAMILY, FRIENDS, OR NEIGHBORS?: MORE THAN THREE TIMES A WEEK
HOW OFTEN DO YOU GET TOGETHER WITH FRIENDS OR RELATIVES?: MORE THAN THREE TIMES A WEEK
HOW OFTEN DO YOU ATTEND CHURCH OR RELIGIOUS SERVICES?: NEVER
DO YOU BELONG TO ANY CLUBS OR ORGANIZATIONS SUCH AS CHURCH GROUPS UNIONS, FRATERNAL OR ATHLETIC GROUPS, OR SCHOOL GROUPS?: NO

## 2023-01-17 ASSESSMENT — LIFESTYLE VARIABLES
HOW MANY STANDARD DRINKS CONTAINING ALCOHOL DO YOU HAVE ON A TYPICAL DAY: 1 OR 2
AUDIT-C TOTAL SCORE: 1
SKIP TO QUESTIONS 9-10: 1
HOW OFTEN DO YOU HAVE A DRINK CONTAINING ALCOHOL: MONTHLY OR LESS
HOW OFTEN DO YOU HAVE SIX OR MORE DRINKS ON ONE OCCASION: NEVER

## 2023-01-17 ASSESSMENT — PAIN SCALES - GENERAL: PAINLEVEL: MODERATE PAIN (5)

## 2023-01-17 NOTE — PATIENT INSTRUCTIONS
They will call you to schedule with the sleep clinic.  Getting the sleep apnea treated will help with your energy and appetite.  Then find a program like WW or noom or myfitnesspal that you like and work on increasing exercise.  If that isn't helping with the weight, let me know and I can refer you to our weight clinic.    Follow-up with Dr. Loyola for the lipedema.    I will let you know about labs and if they don't have a cause for the high BP, then I will send a prescription for chlorthalidone.    Preventive Health Recommendations  Female Ages 50 - 64    Yearly exam: See your health care provider every year in order to  Review health changes.   Discuss preventive care.    Review your medicines if your doctor has prescribed any.    Get a Pap test every three years (unless you have an abnormal result and your provider advises testing more often).  If you get Pap tests with HPV test, you only need to test every 5 years, unless you have an abnormal result.   You do not need a Pap test if your uterus was removed (hysterectomy) and you have not had cancer.  You should be tested each year for STDs (sexually transmitted diseases) if you're at risk.   Have a mammogram every 1 to 2 years.  Have a colonoscopy at age 50, or have a yearly FIT test (stool test). These exams screen for colon cancer.    Have a cholesterol test every 5 years, or more often if advised.  Have a diabetes test (fasting glucose) every three years. If you are at risk for diabetes, you should have this test more often.   If you are at risk for osteoporosis (brittle bone disease), think about having a bone density scan (DEXA).    Shots: Get a flu shot each year. Get a tetanus shot every 10 years.    Nutrition:   Eat at least 5 servings of fruits and vegetables each day.  Eat whole-grain bread, whole-wheat pasta and brown rice instead of white grains and rice.  Get adequate Calcium and Vitamin D.     Lifestyle  Exercise at least 150 minutes a week (30  minutes a day, 5 days a week). This will help you control your weight and prevent disease.  Limit alcohol to one drink per day.  No smoking.   Wear sunscreen to prevent skin cancer.   See your dentist every six months for an exam and cleaning.  See your eye doctor every 1 to 2 years.

## 2023-01-17 NOTE — PROGRESS NOTES
SUBJECTIVE:   CC: Ana Luisa is an 62 year old who presents for preventive health visit.     Patient has been advised of split billing requirements and indicates understanding: Yes  Healthy Habits:     Getting at least 3 servings of Calcium per day:  Yes    Bi-annual eye exam:  Yes    Dental care twice a year:  Yes    Sleep apnea or symptoms of sleep apnea:  Daytime drowsiness, Excessive snoring and Sleep apnea    Diet:  Regular (no restrictions)    Frequency of exercise:  1 day/week    Duration of exercise:  Less than 15 minutes    Taking medications regularly:  Yes    Medication side effects:  Other    PHQ-2 Total Score: 0    Additional concerns today:  Yes (ongoing for years leg pain getting worse )  reports had sleep study at [facility].  They recommended CPAP but insurance wouldn't cover well and didn't think could sleep with it so didn't pursue it.  Now has better coverage.  Is tired all the time.  Exercise - too tired  Elevated BP-  BP Readings from Last 6 Encounters:   01/17/23 (!) 150/82   04/10/22 (!) 156/82   03/22/22 (!) 153/83   01/21/22 118/72   12/08/21 135/82   11/24/21 (!) 150/92    The 10-year ASCVD risk score (Brent STRINGER, et al., 2019) is: 5.3%    Values used to calculate the score:      Age: 62 years      Sex: Female      Is Non- : No      Diabetic: No      Tobacco smoker: No      Systolic Blood Pressure: 150 mmHg      Is BP treated: No      HDL Cholesterol: 48 mg/dL      Total Cholesterol: 222 mg/dL     Today's PHQ-2 Score:   PHQ-2 ( 1999 Pfizer) 1/17/2023   Q1: Little interest or pleasure in doing things 0   Q2: Feeling down, depressed or hopeless 0   PHQ-2 Score 0   PHQ-2 Total Score (12-17 Years)- Positive if 3 or more points; Administer PHQ-A if positive -   Q1: Little interest or pleasure in doing things Not at all   Q2: Feeling down, depressed or hopeless Not at all   PHQ-2 Score 0       Social History     Tobacco Use     Smoking status: Former     Packs/day: 0.50      Years: 10.00     Pack years: 5.00     Types: Cigarettes     Start date:      Quit date: 2006     Years since quittin.0     Smokeless tobacco: Never     Tobacco comments:     quit 2006   Substance Use Topics     Alcohol use: Yes     Alcohol/week: 0.8 standard drinks     Types: 1 Standard drinks or equivalent per week     Comment: once a month      If you drink alcohol do you typically have >3 drinks per day or >7 drinks per week? No    Alcohol Use 2023   Prescreen: >3 drinks/day or >7 drinks/week? No   Prescreen: >3 drinks/day or >7 drinks/week? -       Reviewed orders with patient.  Reviewed health maintenance and updated orders accordingly - Yes  Labs reviewed in EPIC    Breast Cancer Screening:  Any new diagnosis of family breast, ovarian, or bowel cancer? No    FHS-7:   Breast CA Risk Assessment (FHS-7) 2021   Did any of your first-degree relatives have breast or ovarian cancer? No   Did any of your relatives have bilateral breast cancer? No   Did any man in your family have breast cancer? No   Did any woman in your family have breast and ovarian cancer? No   Did any woman in your family have breast cancer before age 50 y? No   Do you have 2 or more relatives with breast and/or ovarian cancer? No   Do you have 2 or more relatives with breast and/or bowel cancer? No       Mammogram Screening: Recommended mammography every 1-2 years with patient discussion and risk factor consideration  Pertinent mammograms are reviewed under the imaging tab.    History of abnormal Pap smear: NO - age 30-65 PAP every 5 years with negative HPV co-testing recommended     Reviewed and updated as needed this visit by clinical staff   Tobacco  Allergies  Meds     [facility] Hx          Reviewed and updated as needed this visit by Provider     Meds     [facility] Hx             Review of Systems   Constitutional: Negative for chills and fever.   HENT: Positive for congestion. Negative for ear pain, hearing loss and sore  "throat.    Eyes: Negative for pain and visual disturbance.   Respiratory: Positive for cough. Negative for shortness of breath.    Cardiovascular: Positive for peripheral edema. Negative for chest pain and palpitations.   Gastrointestinal: Positive for heartburn and nausea. Negative for abdominal pain, constipation, diarrhea and hematochezia.   Breasts:  Positive for breast mass. Negative for tenderness and discharge.   Genitourinary: Positive for vaginal discharge. Negative for dysuria, frequency, genital sores, hematuria, pelvic pain, urgency and vaginal bleeding.   Musculoskeletal: Positive for arthralgias and myalgias. Negative for joint swelling.   Skin: Positive for rash.   Neurological: Positive for dizziness. Negative for weakness, headaches and paresthesias.   Psychiatric/Behavioral: Negative for mood changes. The patient is not nervous/anxious.    congestion - on nasal steroid.  Only with her house.  Worse with furnace.  Causes cough in the morning.  Has seen allergist, didn't recommend shots.  Breast lumps - chronic  Vaginal discharge - has had for years.   BV 11/29 and has used doxycycline for BV  Joint pains - chronic.  Lipedema.  Did PT an has custom compression socks  Rash - eczema flaring and needs refill of topical surgery       OBJECTIVE:   BP (!) 150/82   Pulse 74   Temp 97.7  F (36.5  C) (Tympanic)   Resp 16   Ht 1.651 m (5' 5\")   Wt 106.6 kg (235 lb 1.6 oz)   LMP 07/29/2013   SpO2 100%   BMI 39.12 kg/m    Physical Exam  GENERAL: healthy, alert and no distress  EYES: Eyes grossly normal to inspection, PERRL and conjunctivae and sclerae normal  HENT: ear canals and TM's normal, nose and mouth without ulcers or lesions  NECK: no adenopathy, no asymmetry, masses, or scars and thyroid normal to palpation  RESP: lungs clear to auscultation - no rales, rhonchi or wheezes  CV: regular rate and rhythm, normal S1 S2, no S3 or S4, no murmur, click or rub, no peripheral edema and peripheral pulses " strong  ABDOMEN: soft, nontender, no hepatosplenomegaly, no masses and bowel sounds normal  MS: lebron LE edema to that ends at ankles.  Compression garment on  SKIN: no suspicious lesions or rashes  NEURO: Normal strength and tone, mentation intact and speech normal  PSYCH: mentation appears normal, affect normal/bright    Diagnostic Test Results:  Labs reviewed in Epic    ASSESSMENT/PLAN:   Routine general medical examination at a health care facility  Routine health education discussed: calcium, diet, exercise, weight, safety.     AYSHA (obstructive sleep apnea)  Encouraged treatment to help with symptoms and potentially help with edema  - Adult Sleep Eval & Management  Referral; Future    Primary hypertension  New diagnosis.  AYSHA could relate.  Discussed goals of lifestyle modification with diet and exercise and control of AYSHA.  Should start medication given length of elevated BP.  Could stop if BP becomes controlled   - Basic metabolic panel  (Ca, Cl, CO2, Creat, Gluc, K, Na, BUN); Future  - TSH with free T4 reflex; Future  - Basic metabolic panel  (Ca, Cl, CO2, Creat, Gluc, K, Na, BUN)  - TSH with free T4 reflex  - chlorthalidone (HYGROTON) 25 MG tablet; Take 1 tablet (25 mg) by mouth daily  - **Basic metabolic panel FUTURE 14d; Future    Neuropathic pain syndrome (non-herpetic)  Feels pain is manageable, doesn't want to increase dose.  refill  - pregabalin (LYRICA) 50 MG capsule; Take 1-2 capsules ( mg) by mouth At Bedtime  - amitriptyline (ELAVIL) 10 MG tablet; 1-3 tabs at bedtime to help with sleep and pain    Pain in both lower extremities  refill  - amitriptyline (ELAVIL) 10 MG tablet; 1-3 tabs at bedtime to help with sleep and pain    Fibromyalgia  refill  - amitriptyline (ELAVIL) 10 MG tablet; 1-3 tabs at bedtime to help with sleep and pain    Dermatitis  Refill.    - betamethasone dipropionate (DIPROSONE) 0.05 % external cream; Apply topically 2 times daily    Non-seasonal allergic rhinitis,  "unspecified trigger  Refer to consider immunotherapy as significant symptoms despite antihistamine and duste mite mitigation  - Adult Allergy/Asthma Referral; Future    Chronic vaginitis  Negative wet prep, could consider trying vaginal estrogen  - Wet prep - Clinic Collect    Trochanteric bursitis of both hips  refer  - Orthopedic  Referral; Future    Lipedema  Refer as uncontrolled  - Vascular Surgery Referral; Future    Schatzki's ring  refill  - omeprazole (PRILOSEC) 20 MG DR capsule; Take 1 capsule (20 mg) by mouth daily    Screening for lipid disorders    - Lipid panel reflex to direct LDL Non-fasting; Future  - Lipid panel reflex to direct LDL Non-fasting    Screening for diabetes mellitus    - Hemoglobin A1c; Future  - Hemoglobin A1c              COUNSELING:  Reviewed preventive health counseling, as reflected in patient instructions      BMI:   Estimated body mass index is 39.12 kg/m  as calculated from the following:    Height as of this encounter: 1.651 m (5' 5\").    Weight as of this encounter: 106.6 kg (235 lb 1.6 oz).   Weight management plan: Discussed healthy diet and exercise guidelines      She reports that she quit smoking about 17 years ago. Her smoking use included cigarettes. She started smoking about 39 years ago. She has a 5.00 pack-year smoking history. She has never used smokeless tobacco.          Gabriela Saenz MD  [facility]  "

## 2023-01-18 ENCOUNTER — MYC MEDICAL ADVICE (OUTPATIENT)
Dept: PEDIATRICS | Facility: CLINIC | Age: 63
End: 2023-01-18
Payer: COMMERCIAL

## 2023-01-18 DIAGNOSIS — K22.2 SCHATZKI'S RING: ICD-10-CM

## 2023-01-18 LAB
ANION GAP SERPL CALCULATED.3IONS-SCNC: 13 MMOL/L (ref 7–15)
BUN SERPL-MCNC: 11.7 MG/DL (ref 8–23)
CALCIUM SERPL-MCNC: 9.2 MG/DL (ref 8.8–10.2)
CHLORIDE SERPL-SCNC: 104 MMOL/L (ref 98–107)
CHOLEST SERPL-MCNC: 175 MG/DL
CREAT SERPL-MCNC: 0.58 MG/DL (ref 0.51–0.95)
DEPRECATED HCO3 PLAS-SCNC: 24 MMOL/L (ref 22–29)
GFR SERPL CREATININE-BSD FRML MDRD: >90 ML/MIN/1.73M2
GLUCOSE SERPL-MCNC: 96 MG/DL (ref 70–99)
HDLC SERPL-MCNC: 43 MG/DL
LDLC SERPL CALC-MCNC: 109 MG/DL
NONHDLC SERPL-MCNC: 132 MG/DL
POTASSIUM SERPL-SCNC: 4.2 MMOL/L (ref 3.4–5.3)
SODIUM SERPL-SCNC: 141 MMOL/L (ref 136–145)
TRIGL SERPL-MCNC: 115 MG/DL
TSH SERPL DL<=0.005 MIU/L-ACNC: 2.43 UIU/ML (ref 0.3–4.2)

## 2023-01-18 RX ORDER — CHLORTHALIDONE 25 MG/1
25 TABLET ORAL DAILY
Qty: 30 TABLET | Refills: 0 | Status: SHIPPED | OUTPATIENT
Start: 2023-01-18 | End: 2023-02-01 | Stop reason: SINTOL

## 2023-01-31 ENCOUNTER — LAB (OUTPATIENT)
Dept: LAB | Facility: CLINIC | Age: 63
End: 2023-01-31
Payer: COMMERCIAL

## 2023-01-31 DIAGNOSIS — I10 PRIMARY HYPERTENSION: ICD-10-CM

## 2023-01-31 LAB
ANION GAP SERPL CALCULATED.3IONS-SCNC: 12 MMOL/L (ref 7–15)
BUN SERPL-MCNC: 15.7 MG/DL (ref 8–23)
CALCIUM SERPL-MCNC: 8.9 MG/DL (ref 8.8–10.2)
CHLORIDE SERPL-SCNC: 94 MMOL/L (ref 98–107)
CREAT SERPL-MCNC: 0.62 MG/DL (ref 0.51–0.95)
DEPRECATED HCO3 PLAS-SCNC: 31 MMOL/L (ref 22–29)
GFR SERPL CREATININE-BSD FRML MDRD: >90 ML/MIN/1.73M2
GLUCOSE SERPL-MCNC: 105 MG/DL (ref 70–99)
POTASSIUM SERPL-SCNC: 3.1 MMOL/L (ref 3.4–5.3)
SODIUM SERPL-SCNC: 137 MMOL/L (ref 136–145)

## 2023-01-31 PROCEDURE — 80048 BASIC METABOLIC PNL TOTAL CA: CPT

## 2023-01-31 PROCEDURE — 36415 COLL VENOUS BLD VENIPUNCTURE: CPT

## 2023-02-01 RX ORDER — AMLODIPINE BESYLATE 5 MG/1
5 TABLET ORAL DAILY
Qty: 30 TABLET | Refills: 0 | Status: SHIPPED | OUTPATIENT
Start: 2023-02-01 | End: 2023-03-06

## 2023-02-03 ENCOUNTER — MYC MEDICAL ADVICE (OUTPATIENT)
Dept: PEDIATRICS | Facility: CLINIC | Age: 63
End: 2023-02-03
Payer: COMMERCIAL

## 2023-02-06 ENCOUNTER — TRANSFERRED RECORDS (OUTPATIENT)
Dept: HEALTH INFORMATION MANAGEMENT | Facility: CLINIC | Age: 63
End: 2023-02-06

## 2023-03-07 ENCOUNTER — MYC MEDICAL ADVICE (OUTPATIENT)
Dept: PEDIATRICS | Facility: CLINIC | Age: 63
End: 2023-03-07
Payer: COMMERCIAL

## 2023-03-07 DIAGNOSIS — I10 PRIMARY HYPERTENSION: Primary | ICD-10-CM

## 2023-03-08 NOTE — TELEPHONE ENCOUNTER
Patient due for BMP and BP check. Tried to call patient and left message to call back. Sent [employer] message as well.  Mary CASTILLO RN, BSN

## 2023-03-09 NOTE — TELEPHONE ENCOUNTER
Faxed lab order to [facility] per patient request. Replied to [employer] message.  Mary CASTILLO RN, BSN.

## 2023-04-29 NOTE — PROGRESS NOTES
[facility]    Outpatient Occupational Therapy Discharge Note  Patient: Ana Luisa Rao  : 1960    Beginning/End Dates of Reporting Period:  22 to     Referring Provider: Dr. Burnett    Therapy Diagnosis: Lymph    Client Self Report:      Objective Measurements      Outcome Measures (most recent score):  Lymphedema Life Impact Scale (score range 0-72). A higher score indicates greater impairment.: 50    Goals:   Goal Identifier 1   Goal Description In order to improve functional mobility for activities of living, by the completion of intensive treatment, patient and/or caregiver will;    Verbalize and/or demonstrate understanding of techniques to independently manage their lymphedema at home   Target Date 22   Date Met      Progress (detail required for progress note):  Not met     Goal Identifier 1a   Goal Description tolerate gradient compression bandaging/wearing compression garments 23 hrs/day to decrease volume of extracellular fluid   Target Date 22   Date Met      Progress (detail required for progress note):  Not met     Goal Identifier 1b   Goal Description demonstrate independence in applying gradient compression bandages   Target Date 22   Date Met      Progress (detail required for progress note):  Not met     Goal Identifier 1c   Goal Description demonstrate independence in performing prescribed exercises to facilate the lymph system   Target Date 22   Date Met      Progress (detail required for progress note):  Not met     Goal Identifier 1d   Goal Description be independent in donning/doffing, wearing schedule, and care of compression garments   Target Date 22   Date Met      Progress (detail required for progress note):  Not met     Goal Identifier     Goal Description Pt will decrease pain in the LEs to 2/10 ( not to touch) with 4 hours of standing in  the kitchen.   Target Date 06/24/22   Date Met      Progress (detail required for progress note):  Not met       Plan:  Discharge from therapy.    Discharge:    Reason for Discharge: Patient has failed to schedule further appointments.    Equipment Issued: none    Discharge Plan: Please reconsult as needed.

## 2023-05-08 DIAGNOSIS — I10 PRIMARY HYPERTENSION: ICD-10-CM

## 2023-05-10 RX ORDER — AMLODIPINE BESYLATE 5 MG/1
TABLET ORAL
Qty: 30 TABLET | Refills: 0 | Status: SHIPPED | OUTPATIENT
Start: 2023-05-10 | End: 2023-05-23

## 2023-05-10 NOTE — TELEPHONE ENCOUNTER
Pt was CALLED and she is scheduled for her upcoming physical. She declines scheduling a bp check at this time. Instead she states she will stop into the  [facility] and have it taken there today or this week sometime. Please refill if able.   Kellie Goel on 5/10/2023 at 11:12 AM

## 2023-05-10 NOTE — TELEPHONE ENCOUNTER
Please CALL patient to schedule ma-only BP check and then prevent with me.  DId not respond to mychart message.  Once scheduled, route back to refill

## 2023-05-10 NOTE — TELEPHONE ENCOUNTER
One month given until bp check completed. Does have apt with pcp 5/23/23  Samara Cleary PA-C on 5/10/2023 at 11:54 AM

## 2023-05-10 NOTE — TELEPHONE ENCOUNTER
Routing refill request to provider for review/approval because:  Samantha given x1 and patient did not follow up, please advise  Labs out of range:  BP  BP Readings from Last 3 Encounters:   01/17/23 (!) 150/82   04/10/22 (!) 156/82   03/22/22 (!) 153/83     Mary CASTILLO RN, BSN

## 2023-05-23 ENCOUNTER — OFFICE VISIT (OUTPATIENT)
Dept: PEDIATRICS | Facility: CLINIC | Age: 63
End: 2023-05-23
Payer: COMMERCIAL

## 2023-05-23 VITALS
BODY MASS INDEX: 40.49 KG/M2 | DIASTOLIC BLOOD PRESSURE: 83 MMHG | RESPIRATION RATE: 16 BRPM | HEART RATE: 89 BPM | SYSTOLIC BLOOD PRESSURE: 137 MMHG | TEMPERATURE: 98 F | WEIGHT: 243.3 LBS | OXYGEN SATURATION: 97 %

## 2023-05-23 DIAGNOSIS — Z12.31 VISIT FOR SCREENING MAMMOGRAM: ICD-10-CM

## 2023-05-23 DIAGNOSIS — I10 PRIMARY HYPERTENSION: Primary | ICD-10-CM

## 2023-05-23 DIAGNOSIS — E66.01 MORBID OBESITY (H): ICD-10-CM

## 2023-05-23 PROCEDURE — 99214 OFFICE O/P EST MOD 30 MIN: CPT | Performed by: INTERNAL MEDICINE

## 2023-05-23 RX ORDER — AMLODIPINE BESYLATE 5 MG/1
5 TABLET ORAL DAILY
Qty: 90 TABLET | Refills: 0 | Status: SHIPPED | OUTPATIENT
Start: 2023-05-23 | End: 2023-07-21

## 2023-05-23 RX ORDER — LISINOPRIL 10 MG/1
10 TABLET ORAL DAILY
Qty: 30 TABLET | Refills: 1 | Status: SHIPPED | OUTPATIENT
Start: 2023-05-23 | End: 2023-07-21

## 2023-05-23 ASSESSMENT — ENCOUNTER SYMPTOMS
ARTHRALGIAS: 1
BREAST MASS: 0
HEMATURIA: 0
DIARRHEA: 0
HEADACHES: 0
FREQUENCY: 0
HEMATOCHEZIA: 0
SHORTNESS OF BREATH: 0
NERVOUS/ANXIOUS: 0
FEVER: 0
NAUSEA: 0
ABDOMINAL PAIN: 0
PARESTHESIAS: 0
CONSTIPATION: 0
SORE THROAT: 0
JOINT SWELLING: 0
DYSURIA: 0
PALPITATIONS: 0
CHILLS: 0
EYE PAIN: 0
WEAKNESS: 0
HEARTBURN: 0
MYALGIAS: 1
DIZZINESS: 1
COUGH: 0

## 2023-05-23 ASSESSMENT — PAIN SCALES - GENERAL: PAINLEVEL: MODERATE PAIN (4)

## 2023-05-23 NOTE — PROGRESS NOTES
Assessment & Plan     Primary hypertension  Uncontrolled.  Add lisinopril and follow-up to recheck BP and labs in 2-4 weeks.  Discussed healthy lifestyle  - lisinopril (ZESTRIL) 10 MG tablet; Take 1 tablet (10 mg) by mouth daily  - **Basic metabolic panel FUTURE 14d; Future  - amLODIPine (NORVASC) 5 MG tablet; Take 1 tablet (5 mg) by mouth daily    Morbid obesity (H)  Discussed import of enough calories and taking care of herself.  Try to fit in physical activity when she can.  - **Basic metabolic panel FUTURE 14d; Future    Visit for screening mammogram    - MA SCREENING DIGITAL BILAT - Future  (s+30); Future             See Patient Instructions    Gabriela Saenz MD  [facility] ZAHRA Orosco is a 63 year old, presenting for the following health issues:  Hypertension        5/23/2023     4:50 PM   Additional Questions   Roomed by Anna Luna CMA   Accompanied by self         5/23/2023     4:50 PM   Patient Reported Additional Medications   Patient reports taking the following new medications no     HPI   Incorrectly scheduled for physical when should have been hypertension follow-up.  Hypertension Follow-up      Do you check your blood pressure regularly outside of the clinic? Yes     Are you following a low salt diet? Yes    Are your blood pressures ever more than 140 on the top number (systolic) OR more   than 90 on the bottom number (diastolic), for example 140/90? Yes-140-150/80's      How many servings of fruits and vegetables do you eat daily?  0-1    On average, how many sweetened beverages do you drink each day (Examples: soda, juice, sweet tea, etc.  Do NOT count diet or artificially sweetened beverages)?   0    How many days per week do you exercise enough to make your heart beat faster? 3 or less    How many minutes a day do you exercise enough to make your heart beat faster? 20 - 29    How many days per week do you miss taking your medication? 0  Exercise - only walking  around hospital when caring for .  Diet - feels like isn't eating much but not as healthy as spouse sick and eating differently.  Breakfast - nothing, lunch - goes home to have a peanut butter sandwich or salad, something quick. Dinner - grabbing whatever.  Snacking - not much, popcorn sometimes.      Review of Systems   Constitutional: Negative for chills and fever.   HENT: Negative for congestion, ear pain, hearing loss and sore throat.    Eyes: Negative for pain and visual disturbance.   Respiratory: Negative for cough and shortness of breath.    Cardiovascular: Positive for peripheral edema. Negative for chest pain and palpitations.   Gastrointestinal: Negative for abdominal pain, constipation, diarrhea, heartburn, hematochezia and nausea.   Breasts:  Negative for tenderness, breast mass and discharge.   Genitourinary: Positive for vaginal discharge. Negative for dysuria, frequency, genital sores, hematuria, pelvic pain, urgency and vaginal bleeding.   Musculoskeletal: Positive for arthralgias and myalgias. Negative for joint swelling.   Skin: Positive for rash.   Neurological: Positive for dizziness. Negative for weakness, headaches and paresthesias.   Psychiatric/Behavioral: Negative for mood changes. The patient is not nervous/anxious.     edema and joint pain chronic        Objective    /83   Pulse 89   Temp 98  F (36.7  C) (Oral)   Resp 16   Wt 110.4 kg (243 lb 4.8 oz)   LMP 07/29/2013   SpO2 97%   BMI 40.49 kg/m    Body mass index is 40.49 kg/m .  Physical Exam   GENERAL: healthy, alert and no distress  PSYCH: mentation appears normal, affect mildly anxious

## 2023-05-23 NOTE — PATIENT INSTRUCTIONS
Try to increase your physical activity.    You need to eat regularly - too little can make your body go into starvation mode.  At least 1500 calories per day.  Try to get your fruit and vegetables.

## 2023-05-24 ASSESSMENT — ENCOUNTER SYMPTOMS
HEARTBURN: 0
COUGH: 0
HEMATURIA: 0
DIZZINESS: 1
ABDOMINAL PAIN: 0
ARTHRALGIAS: 1
FREQUENCY: 0
CHILLS: 0
JOINT SWELLING: 0
EYE PAIN: 0
DIARRHEA: 0
PALPITATIONS: 0
WEAKNESS: 0
MYALGIAS: 1
NERVOUS/ANXIOUS: 0
DYSURIA: 0
FEVER: 0
BREAST MASS: 0
CONSTIPATION: 0
HEADACHES: 0
NAUSEA: 0
PARESTHESIAS: 0
SORE THROAT: 0
SHORTNESS OF BREATH: 0
HEMATOCHEZIA: 0

## 2023-06-01 ENCOUNTER — HEALTH MAINTENANCE LETTER (OUTPATIENT)
Age: 63
End: 2023-06-01

## 2023-06-07 ENCOUNTER — ALLIED HEALTH/NURSE VISIT (OUTPATIENT)
Dept: PEDIATRICS | Facility: CLINIC | Age: 63
End: 2023-06-07
Attending: INTERNAL MEDICINE
Payer: COMMERCIAL

## 2023-06-07 VITALS — SYSTOLIC BLOOD PRESSURE: 136 MMHG | DIASTOLIC BLOOD PRESSURE: 80 MMHG

## 2023-06-07 DIAGNOSIS — I10 BENIGN ESSENTIAL HYPERTENSION: Primary | ICD-10-CM

## 2023-06-07 PROCEDURE — 99207 PR NO CHARGE NURSE ONLY: CPT

## 2023-06-07 NOTE — PROGRESS NOTES
BP Readings from Last 6 Encounters:   06/07/23 136/80   05/23/23 137/83   01/17/23 (!) 150/82   04/10/22 (!) 156/82   03/22/22 (!) 153/83   01/21/22 118/72     Please ask patient what BP is running at home.    Patient was also supposed to have labs drawn.  Please call to schedule lab-only since not done at MA visit.

## 2023-06-09 NOTE — PROGRESS NOTES
Left message on machine to call back to get BP readings from home and set up lab only appointment.

## 2023-06-12 ENCOUNTER — TELEPHONE (OUTPATIENT)
Dept: PEDIATRICS | Facility: CLINIC | Age: 63
End: 2023-06-12
Payer: COMMERCIAL

## 2023-06-12 NOTE — TELEPHONE ENCOUNTER
Patient Quality Outreach    Patient is due for the following:   Breast Cancer Screening - Mammogram    Next Steps:   Schedule a mammogram    Type of outreach:    Sent [employer] message.    Questions for provider review:    None     Joyce Bolivar MA  Chart routed to Care Team.

## 2023-06-12 NOTE — LETTER
June 19, 2023      Ana Luisa Rao  [address] [place] [address]        Dear Ana Luisa,       We care about your health and have reviewed your health plan including your medical conditions, medications, and lab results.  Based on this review, it is recommended that you follow up regarding the following health topic(s):  -Breast Cancer Screening    We recommend you take the following action(s):  -schedule a MAMMOGRAM which is due. Please disregard this reminder if you have had this exam elsewhere within the last 1-2 years please let us know so we can update your records.     Please call us at the [facility] - [phone] (or use [employer]) to address the above recommendations.     Thank you for trusting [facility] and we appreciate the opportunity to serve you.  We look forward to supporting your healthcare needs in the future.    Healthy Regards,    Your Health Care Team  [facility]

## 2023-06-14 NOTE — PROGRESS NOTES
Patient scheduled at [facility] lab for 6/16/23.  Called and LVM to contact clinic to get BP readings from home.  Joyce Bolivar, CMA

## 2023-06-16 ENCOUNTER — LAB (OUTPATIENT)
Dept: LAB | Facility: CLINIC | Age: 63
End: 2023-06-16
Payer: COMMERCIAL

## 2023-06-16 ENCOUNTER — MYC MEDICAL ADVICE (OUTPATIENT)
Dept: PEDIATRICS | Facility: CLINIC | Age: 63
End: 2023-06-16

## 2023-06-16 DIAGNOSIS — I10 PRIMARY HYPERTENSION: ICD-10-CM

## 2023-06-16 DIAGNOSIS — E66.01 MORBID OBESITY (H): ICD-10-CM

## 2023-06-16 LAB
ANION GAP SERPL CALCULATED.3IONS-SCNC: 13 MMOL/L (ref 7–15)
BUN SERPL-MCNC: 13.7 MG/DL (ref 8–23)
CALCIUM SERPL-MCNC: 9.4 MG/DL (ref 8.8–10.2)
CHLORIDE SERPL-SCNC: 104 MMOL/L (ref 98–107)
CREAT SERPL-MCNC: 0.63 MG/DL (ref 0.51–0.95)
DEPRECATED HCO3 PLAS-SCNC: 22 MMOL/L (ref 22–29)
GFR SERPL CREATININE-BSD FRML MDRD: >90 ML/MIN/1.73M2
GLUCOSE SERPL-MCNC: 109 MG/DL (ref 70–99)
POTASSIUM SERPL-SCNC: 3.9 MMOL/L (ref 3.4–5.3)
SODIUM SERPL-SCNC: 139 MMOL/L (ref 136–145)

## 2023-06-16 PROCEDURE — 80048 BASIC METABOLIC PNL TOTAL CA: CPT

## 2023-06-16 PROCEDURE — 36415 COLL VENOUS BLD VENIPUNCTURE: CPT

## 2023-06-16 NOTE — TELEPHONE ENCOUNTER
The pt is aware and scheduled for her upcoming lab appointment today. She will send a my chart message with her most recent values.  Kellie Goel on 6/16/2023 at 1:12 PM

## 2023-06-19 NOTE — TELEPHONE ENCOUNTER
Patient Quality Outreach    Patient is due for the following:   Breast Cancer Screening - Mammogram    Next Steps:   Schedule a mammogram    Type of outreach:    Sent letter.    Next Steps:  Reach out within 90 days via Phone.    Max number of attempts reached: Yes. Will try again in 90 days if patient still on fail list.    Questions for provider review:    None     Joyce Bolivar MA  Chart closed.

## 2023-07-18 DIAGNOSIS — I10 PRIMARY HYPERTENSION: ICD-10-CM

## 2023-07-21 RX ORDER — LISINOPRIL 10 MG/1
10 TABLET ORAL DAILY
Qty: 90 TABLET | Refills: 1 | Status: SHIPPED | OUTPATIENT
Start: 2023-07-21 | End: 2024-01-08

## 2023-07-21 RX ORDER — AMLODIPINE BESYLATE 5 MG/1
5 TABLET ORAL DAILY
Qty: 90 TABLET | Refills: 1 | Status: SHIPPED | OUTPATIENT
Start: 2023-07-21 | End: 2024-01-08

## 2023-09-15 ENCOUNTER — HOSPITAL ENCOUNTER (OUTPATIENT)
Dept: MAMMOGRAPHY | Facility: CLINIC | Age: 63
Discharge: HOME | End: 2023-09-15
Attending: INTERNAL MEDICINE | Admitting: INTERNAL MEDICINE
Payer: COMMERCIAL

## 2023-09-15 DIAGNOSIS — Z12.31 VISIT FOR SCREENING MAMMOGRAM: ICD-10-CM

## 2023-09-15 PROCEDURE — 77067 SCR MAMMO BI INCL CAD: CPT

## 2023-11-26 DIAGNOSIS — K22.2 SCHATZKI'S RING: ICD-10-CM

## 2023-12-21 ENCOUNTER — TRANSFERRED RECORDS (OUTPATIENT)
Dept: HEALTH INFORMATION MANAGEMENT | Facility: CLINIC | Age: 63
End: 2023-12-21
Payer: COMMERCIAL

## 2024-01-04 ENCOUNTER — PATIENT OUTREACH (OUTPATIENT)
Dept: CARE COORDINATION | Facility: CLINIC | Age: 64
End: 2024-01-04
Payer: COMMERCIAL

## 2024-01-08 DIAGNOSIS — I10 PRIMARY HYPERTENSION: ICD-10-CM

## 2024-01-08 RX ORDER — AMLODIPINE BESYLATE 5 MG/1
5 TABLET ORAL DAILY
Qty: 90 TABLET | Refills: 0 | Status: SHIPPED | OUTPATIENT
Start: 2024-01-08 | End: 2024-06-03

## 2024-01-08 RX ORDER — LISINOPRIL 10 MG/1
10 TABLET ORAL DAILY
Qty: 90 TABLET | Refills: 0 | Status: SHIPPED | OUTPATIENT
Start: 2024-01-08 | End: 2024-04-02

## 2024-01-09 DIAGNOSIS — M79.7 FIBROMYALGIA: ICD-10-CM

## 2024-01-09 DIAGNOSIS — M79.605 PAIN IN BOTH LOWER EXTREMITIES: ICD-10-CM

## 2024-01-09 DIAGNOSIS — K22.2 SCHATZKI'S RING: ICD-10-CM

## 2024-01-09 DIAGNOSIS — M79.604 PAIN IN BOTH LOWER EXTREMITIES: ICD-10-CM

## 2024-01-09 DIAGNOSIS — L30.9 DERMATITIS: ICD-10-CM

## 2024-01-09 DIAGNOSIS — M79.2 NEUROPATHIC PAIN SYNDROME (NON-HERPETIC): ICD-10-CM

## 2024-01-09 RX ORDER — AMITRIPTYLINE HYDROCHLORIDE 10 MG/1
TABLET ORAL
Qty: 90 TABLET | Refills: 0 | Status: SHIPPED | OUTPATIENT
Start: 2024-01-09 | End: 2024-02-20

## 2024-01-09 RX ORDER — BETAMETHASONE DIPROPIONATE 0.5 MG/G
CREAM TOPICAL
Qty: 15 G | Refills: 1 | Status: SHIPPED | OUTPATIENT
Start: 2024-01-09

## 2024-01-09 RX ORDER — PREGABALIN 50 MG
CAPSULE ORAL
Qty: 180 CAPSULE | Refills: 0 | Status: SHIPPED | OUTPATIENT
Start: 2024-01-09 | End: 2024-04-15

## 2024-01-11 ENCOUNTER — TRANSFERRED RECORDS (OUTPATIENT)
Dept: HEALTH INFORMATION MANAGEMENT | Facility: CLINIC | Age: 64
End: 2024-01-11
Payer: COMMERCIAL

## 2024-01-18 ENCOUNTER — PATIENT OUTREACH (OUTPATIENT)
Dept: CARE COORDINATION | Facility: CLINIC | Age: 64
End: 2024-01-18
Payer: COMMERCIAL

## 2024-01-22 DIAGNOSIS — I10 PRIMARY HYPERTENSION: ICD-10-CM

## 2024-01-22 RX ORDER — LISINOPRIL 10 MG/1
10 TABLET ORAL DAILY
Qty: 90 TABLET | Refills: 0 | OUTPATIENT
Start: 2024-01-22

## 2024-02-18 DIAGNOSIS — M79.605 PAIN IN BOTH LOWER EXTREMITIES: ICD-10-CM

## 2024-02-18 DIAGNOSIS — M79.2 NEUROPATHIC PAIN SYNDROME (NON-HERPETIC): ICD-10-CM

## 2024-02-18 DIAGNOSIS — M79.604 PAIN IN BOTH LOWER EXTREMITIES: ICD-10-CM

## 2024-02-18 DIAGNOSIS — M79.7 FIBROMYALGIA: ICD-10-CM

## 2024-02-20 RX ORDER — AMITRIPTYLINE HYDROCHLORIDE 10 MG/1
TABLET ORAL
Qty: 90 TABLET | Refills: 1 | Status: SHIPPED | OUTPATIENT
Start: 2024-02-20 | End: 2024-04-10

## 2024-03-17 ENCOUNTER — HEALTH MAINTENANCE LETTER (OUTPATIENT)
Age: 64
End: 2024-03-17

## 2024-03-26 DIAGNOSIS — K22.2 SCHATZKI'S RING: ICD-10-CM

## 2024-04-02 DIAGNOSIS — I10 PRIMARY HYPERTENSION: ICD-10-CM

## 2024-04-02 RX ORDER — LISINOPRIL 10 MG/1
10 TABLET ORAL DAILY
Qty: 90 TABLET | Refills: 0 | Status: SHIPPED | OUTPATIENT
Start: 2024-04-02 | End: 2024-04-19

## 2024-04-10 DIAGNOSIS — M79.604 PAIN IN BOTH LOWER EXTREMITIES: ICD-10-CM

## 2024-04-10 DIAGNOSIS — M79.2 NEUROPATHIC PAIN SYNDROME (NON-HERPETIC): ICD-10-CM

## 2024-04-10 DIAGNOSIS — M79.7 FIBROMYALGIA: ICD-10-CM

## 2024-04-10 DIAGNOSIS — M79.605 PAIN IN BOTH LOWER EXTREMITIES: ICD-10-CM

## 2024-04-10 RX ORDER — AMITRIPTYLINE HYDROCHLORIDE 10 MG/1
TABLET ORAL
Qty: 90 TABLET | Refills: 1 | Status: SHIPPED | OUTPATIENT
Start: 2024-04-10

## 2024-04-15 DIAGNOSIS — M79.2 NEUROPATHIC PAIN SYNDROME (NON-HERPETIC): ICD-10-CM

## 2024-04-15 RX ORDER — PREGABALIN 50 MG
CAPSULE ORAL
Qty: 180 CAPSULE | Refills: 0 | Status: SHIPPED | OUTPATIENT
Start: 2024-04-15 | End: 2024-06-19

## 2024-04-19 ENCOUNTER — OFFICE VISIT (OUTPATIENT)
Dept: PEDIATRICS | Facility: CLINIC | Age: 64
End: 2024-04-19
Payer: COMMERCIAL

## 2024-04-19 VITALS
SYSTOLIC BLOOD PRESSURE: 128 MMHG | RESPIRATION RATE: 16 BRPM | DIASTOLIC BLOOD PRESSURE: 72 MMHG | HEIGHT: 65 IN | BODY MASS INDEX: 38.77 KG/M2 | OXYGEN SATURATION: 100 % | WEIGHT: 232.7 LBS | HEART RATE: 82 BPM | TEMPERATURE: 97 F

## 2024-04-19 DIAGNOSIS — R06.01 ORTHOPNEA: ICD-10-CM

## 2024-04-19 DIAGNOSIS — E55.9 VITAMIN D DEFICIENCY: ICD-10-CM

## 2024-04-19 DIAGNOSIS — G61.9 INFLAMMATORY AND TOXIC NEUROPATHY (H): ICD-10-CM

## 2024-04-19 DIAGNOSIS — Z00.00 ROUTINE GENERAL MEDICAL EXAMINATION AT A HEALTH CARE FACILITY: Primary | ICD-10-CM

## 2024-04-19 DIAGNOSIS — I47.10 SUPRAVENTRICULAR TACHYCARDIA (H): ICD-10-CM

## 2024-04-19 DIAGNOSIS — R05.3 CHRONIC COUGH: ICD-10-CM

## 2024-04-19 DIAGNOSIS — E66.01 MORBID OBESITY (H): ICD-10-CM

## 2024-04-19 DIAGNOSIS — I10 PRIMARY HYPERTENSION: ICD-10-CM

## 2024-04-19 DIAGNOSIS — Z13.220 SCREENING FOR LIPID DISORDERS: ICD-10-CM

## 2024-04-19 DIAGNOSIS — M35.00 SICCA SYNDROME (H): ICD-10-CM

## 2024-04-19 DIAGNOSIS — G62.2 INFLAMMATORY AND TOXIC NEUROPATHY (H): ICD-10-CM

## 2024-04-19 PROBLEM — R06.02 SHORTNESS OF BREATH: Status: RESOLVED | Noted: 2019-05-21 | Resolved: 2024-04-19

## 2024-04-19 PROBLEM — R60.0 LIPEDEMA OF LOWER EXTREMITY: Status: ACTIVE | Noted: 2019-04-19

## 2024-04-19 LAB — FOLATE SERPL-MCNC: 24.4 NG/ML (ref 4.6–34.8)

## 2024-04-19 PROCEDURE — 90471 IMMUNIZATION ADMIN: CPT | Performed by: INTERNAL MEDICINE

## 2024-04-19 PROCEDURE — 36415 COLL VENOUS BLD VENIPUNCTURE: CPT | Performed by: INTERNAL MEDICINE

## 2024-04-19 PROCEDURE — 82306 VITAMIN D 25 HYDROXY: CPT | Performed by: INTERNAL MEDICINE

## 2024-04-19 PROCEDURE — 99214 OFFICE O/P EST MOD 30 MIN: CPT | Mod: 25 | Performed by: INTERNAL MEDICINE

## 2024-04-19 PROCEDURE — 99396 PREV VISIT EST AGE 40-64: CPT | Mod: 25 | Performed by: INTERNAL MEDICINE

## 2024-04-19 PROCEDURE — 82607 VITAMIN B-12: CPT | Performed by: INTERNAL MEDICINE

## 2024-04-19 PROCEDURE — 86038 ANTINUCLEAR ANTIBODIES: CPT | Performed by: INTERNAL MEDICINE

## 2024-04-19 PROCEDURE — 80048 BASIC METABOLIC PNL TOTAL CA: CPT | Performed by: INTERNAL MEDICINE

## 2024-04-19 PROCEDURE — 82746 ASSAY OF FOLIC ACID SERUM: CPT | Performed by: INTERNAL MEDICINE

## 2024-04-19 PROCEDURE — 86235 NUCLEAR ANTIGEN ANTIBODY: CPT | Performed by: INTERNAL MEDICINE

## 2024-04-19 PROCEDURE — 90750 HZV VACC RECOMBINANT IM: CPT | Performed by: INTERNAL MEDICINE

## 2024-04-19 PROCEDURE — 80061 LIPID PANEL: CPT | Performed by: INTERNAL MEDICINE

## 2024-04-19 RX ORDER — LANOLIN ALCOHOL/MO/W.PET/CERES
CREAM (GRAM) TOPICAL
COMMUNITY

## 2024-04-19 RX ORDER — TRIAMCINOLONE ACETONIDE 1 MG/G
CREAM TOPICAL 2 TIMES DAILY PRN
COMMUNITY
Start: 2024-01-18

## 2024-04-19 RX ORDER — TACROLIMUS 0.3 MG/G
OINTMENT TOPICAL 2 TIMES DAILY
COMMUNITY
Start: 2023-12-05

## 2024-04-19 RX ORDER — LOSARTAN POTASSIUM 25 MG/1
25 TABLET ORAL DAILY
Qty: 90 TABLET | Refills: 0 | Status: SHIPPED | OUTPATIENT
Start: 2024-04-19 | End: 2024-06-19

## 2024-04-19 RX ORDER — HYDROCORTISONE 25 MG/G
OINTMENT TOPICAL
COMMUNITY
Start: 2023-12-05

## 2024-04-19 SDOH — HEALTH STABILITY: PHYSICAL HEALTH: ON AVERAGE, HOW MANY MINUTES DO YOU ENGAGE IN EXERCISE AT THIS LEVEL?: 30 MIN

## 2024-04-19 SDOH — HEALTH STABILITY: PHYSICAL HEALTH: ON AVERAGE, HOW MANY DAYS PER WEEK DO YOU ENGAGE IN MODERATE TO STRENUOUS EXERCISE (LIKE A BRISK WALK)?: 3 DAYS

## 2024-04-19 ASSESSMENT — PAIN SCALES - GENERAL: PAINLEVEL: NO PAIN (0)

## 2024-04-19 ASSESSMENT — SOCIAL DETERMINANTS OF HEALTH (SDOH): HOW OFTEN DO YOU GET TOGETHER WITH FRIENDS OR RELATIVES?: MORE THAN THREE TIMES A WEEK

## 2024-04-19 NOTE — PROGRESS NOTES
"Preventive Care Visit  [facility] ZAHRA Saenz MD, Internal Medicine  Apr 19, 2024      Assessment & Plan     Routine general medical examination at a health care facility  Routine health education discussed: calcium, diet, exercise, weight, safety.     Inflammatory and toxic neuropathy (H24)  Check for cause.  Last rheum evaluation about 5 yrs ago  - Basic metabolic panel  (Ca, Cl, CO2, Creat, Gluc, K, Na, BUN); Future  - Anti Nuclear Felicitas IgG by IFA with Reflex; Future  - SSA Ro MARIELA Antibody IgG; Future  - SSB La MARIELA Antibody IgG; Future  - Vitamin B12; Future  - Vitamin D Deficiency; Future  - Folate; Future  - Folate  - Vitamin D Deficiency  - Vitamin B12  - SSB La MARIELA Antibody IgG  - SSA Ro MARIELA Antibody IgG  - Anti Nuclear Felicitas IgG by IFA with Reflex  - Basic metabolic panel  (Ca, Cl, CO2, Creat, Gluc, K, Na, BUN)    Supraventricular tachycardia (H24)  Stable, does not think bad enough to want treatment    Morbid obesity (H)  Encouraged healthy diet and exercise and will help with BP control.      Sicca syndrome (H24)  Check labs for possible sjogrems    Vitamin D deficiency  Check labs    Primary hypertension  Controlled, change medication given cough and check labs  - Basic metabolic panel  (Ca, Cl, CO2, Creat, Gluc, K, Na, BUN); Future  - losartan (COZAAR) 25 MG tablet; Take 1 tablet (25 mg) by mouth daily  - Echocardiogram Complete; Future  - Basic metabolic panel  (Ca, Cl, CO2, Creat, Gluc, K, Na, BUN)    Chronic cough  Normal lung evaluation.  Recheck ECHO  - Echocardiogram Complete; Future    Orthopnea  Check ECHO, if normal, consider gerd and increase PPI  - Echocardiogram Complete; Future    Screening for lipid disorders    - Lipid panel reflex to direct LDL Fasting; Future  - Lipid panel reflex to direct LDL Fasting              BMI  Estimated body mass index is 38.58 kg/m  as calculated from the following:    Height as of this encounter: 1.654 m (5' 5.12\").    Weight as of this " encounter: 105.6 kg (232 lb 11.2 oz).   Weight management plan: Discussed healthy diet and exercise guidelines    Counseling  Appropriate preventive services were discussed with this patient, including applicable screening as appropriate for fall prevention, nutrition, physical activity, Tobacco-use cessation, weight loss and cognition.  Checklist reviewing preventive services available has been given to the patient.  Reviewed patient's diet, addressing concerns and/or questions.   She is at risk for lack of exercise and has been provided with information to increase physical activity for the benefit of her well-being.       See Patient Instructions    Subjective   Ana Luisa is a 64 year old, presenting for the following:  Physical        4/19/2024    12:49 PM   Additional Questions   Roomed by [facility]   Accompanied by SELF         4/19/2024    12:49 PM   Patient Reported Additional Medications   Patient reports taking the following new medications NONE        Health Care Directive  Patient does not have a Health Care Directive or Living Will: Discussed advance care planning with patient; however, patient declined at this time.    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  Vascular referred her to a surgeon at [facility] that does a specific procedure for that.  Usually only takes one lyrica at bedtime and that helps.  Having a cough in the afternoon that is clear, frothy sputum with large volume.  Feels like bad tickle in her lungs.  Started before starting lisinopril a year ago.  Has had a history of wheezing but not recently.  Went to [facility] and they thought not asthma.  ENT scoped her and told normal.  Sometimes wakes with it - laying back is worse.  Does note palpitations      4/19/2024   General Health   How would you rate your overall physical health? (!) FAIR   Feel stress (tense, anxious, or unable to sleep) To some extent   (!) STRESS CONCERN      4/19/2024   Nutrition   Three or more servings  of calcium each day? (!) I DON'T KNOW   Diet: Regular (no restrictions)   How many servings of fruit and vegetables per day? (!) 0-1   How many sweetened beverages each day? 0-1         2024   Exercise   Days per week of moderate/strenous exercise 3 days   Average minutes spent exercising at this level 30 min         2024   Social Factors   Frequency of gathering with friends or relatives More than three times a week   Worry food won't last until get money to buy more No   Food not last or not have enough money for food? No   Do you have housing?  Yes   Are you worried about losing your housing? No   Lack of transportation? No   Unable to get utilities (heat,electricity)? No         2024   Fall Risk   Fallen 2 or more times in the past year? No   Trouble with walking or balance? No          2024   Dental   Dentist two times every year? Yes       Today's PHQ-2 Score:       2024    10:21 AM   PHQ-2 (  [employer])   Q1: Little interest or pleasure in doing things 0   Q2: Feeling down, depressed or hopeless 0   PHQ-2 Score 0   Q1: Little interest or pleasure in doing things Not at all   Q2: Feeling down, depressed or hopeless Not at all   PHQ-2 Score 0           2024   Substance Use   Alcohol more than 3/day or more than 7/wk No   Do you use any other substances recreationally? No     Social History     Tobacco Use    Smoking status: Former     Current packs/day: 0.00     Average packs/day: 1 pack/day for 22.0 years (22.0 ttl pk-yrs)     Types: Cigarettes     Start date:      Quit date: 2006     Years since quittin.3     Passive exposure: Never    Smokeless tobacco: Never    Tobacco comments:     quit 2006   Vaping Use    Vaping status: Never Used   Substance Use Topics    Alcohol use: Yes     Alcohol/week: 0.8 standard drinks of alcohol     Types: 1 Standard drinks or equivalent per week     Comment: 2/month    Drug use: No             2024   Breast Cancer Screening  "  Family history of breast, colon, or ovarian cancer? No / Unknown         9/15/2023   LAST FHS-7 RESULTS   1st degree relative breast or ovarian cancer No   Any relative bilateral breast cancer No   Any male have breast cancer No   Any ONE woman have BOTH breast AND ovarian cancer No   Any woman with breast cancer before 50yrs No   2 or more relatives with breast AND/OR ovarian cancer No   2 or more relatives with breast AND/OR bowel cancer No       Mammogram Screening - Mammogram every 1-2 years updated in Health Maintenance based on mutual decision making        4/19/2024   STI Screening   New sexual partner(s) since last STI/HIV test? No     History of abnormal Pap smear: NO - age 30-65 PAP every 5 years with negative HPV co-testing recommended       ASCVD Risk   The 10-year ASCVD risk score (Brent STRINGER, et al., 2019) is: 5.7%    Values used to calculate the score:      Age: 64 years      Sex: Female      Is Non- : No      Diabetic: No      Tobacco smoker: No      Systolic Blood Pressure: 128 mmHg      Is BP treated: Yes      HDL Cholesterol: 43 mg/dL      Total Cholesterol: 175 mg/dL           Reviewed and updated as needed this visit by Provider     Meds  Problems    Fam Hx            Labs reviewed in EPIC         Objective    Exam  /72 (BP Location: Right arm, Patient Position: Sitting, Cuff Size: Adult Large)   Pulse 82   Temp 97  F (36.1  C) (Tympanic)   Resp 16   Ht 1.654 m (5' 5.12\")   Wt 105.6 kg (232 lb 11.2 oz)   LMP 07/29/2013   SpO2 100%   BMI 38.58 kg/m     Estimated body mass index is 38.58 kg/m  as calculated from the following:    Height as of this encounter: 1.654 m (5' 5.12\").    Weight as of this encounter: 105.6 kg (232 lb 11.2 oz).    Physical Exam  GENERAL: alert and no distress  EYES: Eyes grossly normal to inspection, PERRL and conjunctivae and sclerae normal  HENT: ear canals and TM's normal, nose and mouth without ulcers or lesions  NECK: " no adenopathy, no asymmetry, masses, or scars  RESP: lungs clear to auscultation - no rales, rhonchi or wheezes  CV: regular rate and rhythm, normal S1 S2, no S3 or S4, no murmur, click or rub, no peripheral edema  ABDOMEN: soft, nontender, no hepatosplenomegaly, no masses and bowel sounds normal  MS: no gross musculoskeletal defects noted, no edema  SKIN: no suspicious lesions or rashes  NEURO: Normal strength and tone, mentation intact and speech normal  PSYCH: mentation appears normal, affect normal/bright        Signed Electronically by: Gabriela Saenz MD

## 2024-04-19 NOTE — PATIENT INSTRUCTIONS
Schedule with the surgeon at [facility] about lipedema surgery options.    Preventive Care Advice   This is general advice given by our system to help you stay healthy. However, your care team may have specific advice just for you. Please talk to your care team about your preventive care needs.  Nutrition  Eat 5 or more servings of fruits and vegetables each day.  Try wheat bread, brown rice and whole grain pasta (instead of white bread, rice, and pasta).  Get enough calcium and vitamin D. Check the label on foods and aim for 100% of the RDA (recommended daily allowance).  Lifestyle  Exercise at least 150 minutes each week   (30 minutes a day, 5 days a week).  Do muscle strengthening activities 2 days a week. These help control your weight and prevent disease.  No smoking.  Wear sunscreen to prevent skin cancer.  Have a dental exam and cleaning every 6 months.  Yearly exams  See your health care team every year to talk about:  Any changes in your health.  Any medicines your care team has prescribed.  Preventive care, family planning, and ways to prevent chronic diseases.  Shots (vaccines)   HPV shots (up to age 26), if you've never had them before.  Hepatitis B shots (up to age 59), if you've never had them before.  COVID-19 shot: Get this shot when it's due.  Flu shot: Get a flu shot every year.  Tetanus shot: Get a tetanus shot every 10 years.  Pneumococcal, hepatitis A, and RSV shots: Ask your care team if you need these based on your risk.  Shingles shot (for age 50 and up).  General health tests  Diabetes screening:  Starting at age 35, Get screened for diabetes at least every 3 years.  If you are younger than age 35, ask your care team if you should be screened for diabetes.  Cholesterol test: At age 39, start having a cholesterol test every 5 years, or more often if advised.  Bone density scan (DEXA): At age 50, ask your care team if you should have this scan for osteoporosis (brittle bones).  Hepatitis C: Get  tested at least once in your life.  STIs (sexually transmitted infections)  Before age 24: Ask your care team if you should be screened for STIs.  After age 24: Get screened for STIs if you're at risk. You are at risk for STIs (including HIV) if:  You are sexually active with more than one person.  You don't use condoms every time.  You or a partner was diagnosed with a sexually transmitted infection.  If you are at risk for HIV, ask about PrEP medicine to prevent HIV.  Get tested for HIV at least once in your life, whether you are at risk for HIV or not.  Cancer screening tests  Cervical cancer screening: If you have a cervix, begin getting regular cervical cancer screening tests at age 21. Most people who have regular screenings with normal results can stop after age 65. Talk about this with your provider.  Breast cancer scan (mammogram): If you've ever had breasts, begin having regular mammograms starting at age 40. This is a scan to check for breast cancer.  Colon cancer screening: It is important to start screening for colon cancer at age 45.  Have a colonoscopy test every 10 years (or more often if you're at risk) Or, ask your provider about stool tests like a FIT test every year or Cologuard test every 3 years.  To learn more about your testing options, visit: https://www.[employer]/860815.pdf.  For help making a decision, visit: https://bit.ly/re61906.  Prostate cancer screening test: If you have a prostate and are age 55 to 69, ask your provider if you would benefit from a yearly prostate cancer screening test.  Lung cancer screening: If you are a current or former smoker age 50 to 80, ask your care team if ongoing lung cancer screenings are right for you.  For informational purposes only. Not to replace the advice of your health care provider. Copyright   2023 [employer]. All rights reserved. Clinically reviewed by the [facility]. [employer] 262945 - REV  01/24.    Learning About Stress  What is stress?     Stress is your body's response to a hard situation. Your body can have a physical, emotional, or mental response. Stress is a fact of life for most people, and it affects everyone differently. What causes stress for you may not be stressful for someone else.  A lot of things can cause stress. You may feel stress when you go on a job interview, take a test, or run a race. This kind of short-term stress is normal and even useful. It can help you if you need to work hard or react quickly. For example, stress can help you finish an important job on time.  Long-term stress is caused by ongoing stressful situations or events. Examples of long-term stress include long-term health problems, ongoing problems at work, or conflicts in your family. Long-term stress can harm your health.  How does stress affect your health?  When you are stressed, your body responds as though you are in danger. It makes hormones that speed up your heart, make you breathe faster, and give you a burst of energy. This is called the fight-or-flight stress response. If the stress is over quickly, your body goes back to normal and no harm is done.  But if stress happens too often or lasts too long, it can have bad effects. Long-term stress can make you more likely to get sick, and it can make symptoms of some diseases worse. If you tense up when you are stressed, you may develop neck, shoulder, or low back pain. Stress is linked to high blood pressure and heart disease.  Stress also harms your emotional health. It can make you rodríguez, tense, or depressed. Your relationships may suffer, and you may not do well at work or school.  What can you do to manage stress?  You can try these things to help manage stress:   Do something active. Exercise or activity can help reduce stress. Walking is a great way to get started. Even everyday activities such as housecleaning or yard work can help.  Try yoga or tere  chi. These techniques combine exercise and meditation. You may need some training at first to learn them.  Do something you enjoy. For example, listen to music or go to a movie. Practice your hobby or do volunteer work.  Meditate. This can help you relax, because you are not worrying about what happened before or what may happen in the future.  Do guided imagery. Imagine yourself in any setting that helps you feel calm. You can use online videos, books, or a teacher to guide you.  Do breathing exercises. For example:  From a standing position, bend forward from the waist with your knees slightly bent. Let your arms dangle close to the floor.  Breathe in slowly and deeply as you return to a standing position. Roll up slowly and lift your head last.  Hold your breath for just a few seconds in the standing position.  Breathe out slowly and bend forward from the waist.  Let your feelings out. Talk, laugh, cry, and express anger when you need to. Talking with supportive friends or family, a counselor, or a dolores leader about your feelings is a healthy way to relieve stress. Avoid discussing your feelings with people who make you feel worse.  Write. It may help to write about things that are bothering you. This helps you find out how much stress you feel and what is causing it. When you know this, you can find better ways to cope.  What can you do to prevent stress?  You might try some of these things to help prevent stress:  Manage your time. This helps you find time to do the things you want and need to do.  Get enough sleep. Your body recovers from the stresses of the day while you are sleeping.  Get support. Your family, friends, and community can make a difference in how you experience stress.  Limit your news feed. Avoid or limit time on social media or news that may make you feel stressed.  Do something active. Exercise or activity can help reduce stress. Walking is a great way to get started.  Where can you learn  "more?  Go to https://www.[employer].net/patiented  Enter N032 in the search box to learn more about \"Learning About Stress.\"  Current as of: October 24, 2023               Content Version: 14.0    2337-6929 [employer].   Care instructions adapted under license by your healthcare professional. If you have questions about a medical condition or this instruction, always ask your healthcare professional. [employer] disclaims any warranty or liability for your use of this information.      "

## 2024-04-21 LAB
ANION GAP SERPL CALCULATED.3IONS-SCNC: 11 MMOL/L (ref 7–15)
BUN SERPL-MCNC: 16 MG/DL (ref 8–23)
CALCIUM SERPL-MCNC: 9.2 MG/DL (ref 8.8–10.2)
CHLORIDE SERPL-SCNC: 103 MMOL/L (ref 98–107)
CHOLEST SERPL-MCNC: 196 MG/DL
CREAT SERPL-MCNC: 0.59 MG/DL (ref 0.51–0.95)
DEPRECATED HCO3 PLAS-SCNC: 24 MMOL/L (ref 22–29)
EGFRCR SERPLBLD CKD-EPI 2021: >90 ML/MIN/1.73M2
FASTING STATUS PATIENT QL REPORTED: ABNORMAL
GLUCOSE SERPL-MCNC: 93 MG/DL (ref 70–99)
HDLC SERPL-MCNC: 40 MG/DL
LDLC SERPL CALC-MCNC: 137 MG/DL
NONHDLC SERPL-MCNC: 156 MG/DL
POTASSIUM SERPL-SCNC: 3.7 MMOL/L (ref 3.4–5.3)
SODIUM SERPL-SCNC: 138 MMOL/L (ref 135–145)
TRIGL SERPL-MCNC: 96 MG/DL
VIT B12 SERPL-MCNC: 1207 PG/ML (ref 232–1245)
VIT D+METAB SERPL-MCNC: 25 NG/ML (ref 20–50)

## 2024-04-22 LAB
ANA SER QL IF: NEGATIVE
ENA SS-A AB SER IA-ACNC: 0.5 U/ML
ENA SS-A AB SER IA-ACNC: NEGATIVE
ENA SS-B IGG SER IA-ACNC: <0.6 U/ML
ENA SS-B IGG SER IA-ACNC: NEGATIVE

## 2024-05-09 ENCOUNTER — HOSPITAL ENCOUNTER (OUTPATIENT)
Dept: CARDIOLOGY | Facility: CLINIC | Age: 64
Discharge: HOME | End: 2024-05-09
Attending: INTERNAL MEDICINE | Admitting: INTERNAL MEDICINE
Payer: COMMERCIAL

## 2024-05-09 DIAGNOSIS — R05.3 CHRONIC COUGH: ICD-10-CM

## 2024-05-09 DIAGNOSIS — K22.2 SCHATZKI'S RING: ICD-10-CM

## 2024-05-09 DIAGNOSIS — I10 PRIMARY HYPERTENSION: ICD-10-CM

## 2024-05-09 DIAGNOSIS — R06.01 ORTHOPNEA: ICD-10-CM

## 2024-05-09 LAB — LVEF ECHO: NORMAL

## 2024-05-09 PROCEDURE — 93306 TTE W/DOPPLER COMPLETE: CPT | Mod: 26 | Performed by: INTERNAL MEDICINE

## 2024-05-09 PROCEDURE — 93306 TTE W/DOPPLER COMPLETE: CPT

## 2024-05-09 RX ORDER — OMEPRAZOLE 40 MG/1
40 CAPSULE, DELAYED RELEASE ORAL DAILY
Qty: 90 CAPSULE | Refills: 0 | Status: SHIPPED | OUTPATIENT
Start: 2024-05-09 | End: 2024-07-01

## 2024-06-01 DIAGNOSIS — I10 PRIMARY HYPERTENSION: ICD-10-CM

## 2024-06-03 RX ORDER — AMLODIPINE BESYLATE 5 MG/1
5 TABLET ORAL DAILY
Qty: 90 TABLET | Refills: 0 | Status: SHIPPED | OUTPATIENT
Start: 2024-06-03 | End: 2024-07-01

## 2024-06-19 DIAGNOSIS — I10 PRIMARY HYPERTENSION: ICD-10-CM

## 2024-06-19 DIAGNOSIS — M79.2 NEUROPATHIC PAIN SYNDROME (NON-HERPETIC): ICD-10-CM

## 2024-06-19 RX ORDER — PREGABALIN 50 MG/1
50-100 CAPSULE ORAL AT BEDTIME
Qty: 180 CAPSULE | Refills: 1 | Status: SHIPPED | OUTPATIENT
Start: 2024-06-19

## 2024-06-19 RX ORDER — LOSARTAN POTASSIUM 25 MG
25 TABLET ORAL DAILY
Qty: 90 TABLET | Refills: 0 | Status: SHIPPED | OUTPATIENT
Start: 2024-06-19 | End: 2024-09-16

## 2024-06-29 ENCOUNTER — MYC REFILL (OUTPATIENT)
Dept: PEDIATRICS | Facility: CLINIC | Age: 64
End: 2024-06-29
Payer: COMMERCIAL

## 2024-06-29 DIAGNOSIS — I10 PRIMARY HYPERTENSION: ICD-10-CM

## 2024-06-29 DIAGNOSIS — K22.2 SCHATZKI'S RING: ICD-10-CM

## 2024-06-29 RX ORDER — AMLODIPINE BESYLATE 5 MG/1
5 TABLET ORAL DAILY
Qty: 90 TABLET | Refills: 0 | Status: CANCELLED | OUTPATIENT
Start: 2024-06-29

## 2024-06-29 RX ORDER — LOSARTAN POTASSIUM 25 MG/1
25 TABLET ORAL DAILY
Qty: 90 TABLET | Refills: 0 | Status: CANCELLED | OUTPATIENT
Start: 2024-06-29

## 2024-06-29 RX ORDER — OMEPRAZOLE 40 MG/1
40 CAPSULE, DELAYED RELEASE ORAL DAILY
Qty: 90 CAPSULE | Refills: 0 | Status: CANCELLED | OUTPATIENT
Start: 2024-06-29

## 2024-07-01 RX ORDER — AMLODIPINE BESYLATE 5 MG/1
5 TABLET ORAL DAILY
Qty: 90 TABLET | Refills: 0 | Status: SHIPPED | OUTPATIENT
Start: 2024-07-01

## 2024-07-01 RX ORDER — OMEPRAZOLE 40 MG/1
40 CAPSULE, DELAYED RELEASE ORAL DAILY
Qty: 90 CAPSULE | Refills: 0 | Status: SHIPPED | OUTPATIENT
Start: 2024-07-01 | End: 2024-09-18

## 2024-08-16 ENCOUNTER — PATIENT OUTREACH (OUTPATIENT)
Dept: CARE COORDINATION | Facility: CLINIC | Age: 64
End: 2024-08-16
Payer: COMMERCIAL

## 2024-09-13 ENCOUNTER — PATIENT OUTREACH (OUTPATIENT)
Dept: CARE COORDINATION | Facility: CLINIC | Age: 64
End: 2024-09-13
Payer: COMMERCIAL

## 2024-09-14 DIAGNOSIS — I10 PRIMARY HYPERTENSION: ICD-10-CM

## 2024-09-16 RX ORDER — LOSARTAN POTASSIUM 25 MG
25 TABLET ORAL DAILY
Qty: 90 TABLET | Refills: 0 | Status: SHIPPED | OUTPATIENT
Start: 2024-09-16

## 2024-09-18 DIAGNOSIS — K22.2 SCHATZKI'S RING: ICD-10-CM

## 2024-09-18 RX ORDER — OMEPRAZOLE 40 MG/1
CAPSULE, DELAYED RELEASE ORAL
Qty: 90 CAPSULE | Refills: 1 | Status: SHIPPED | OUTPATIENT
Start: 2024-09-18

## 2024-09-27 ENCOUNTER — HOSPITAL ENCOUNTER (OUTPATIENT)
Dept: MAMMOGRAPHY | Facility: CLINIC | Age: 64
Discharge: HOME | End: 2024-09-27
Attending: INTERNAL MEDICINE | Admitting: INTERNAL MEDICINE
Payer: COMMERCIAL

## 2024-09-27 DIAGNOSIS — Z12.31 VISIT FOR SCREENING MAMMOGRAM: ICD-10-CM

## 2024-09-27 PROCEDURE — 77063 BREAST TOMOSYNTHESIS BI: CPT

## 2024-11-26 DIAGNOSIS — I10 PRIMARY HYPERTENSION: ICD-10-CM

## 2024-11-26 RX ORDER — AMLODIPINE BESYLATE 5 MG
5 TABLET ORAL DAILY
Qty: 90 TABLET | Refills: 0 | Status: SHIPPED | OUTPATIENT
Start: 2024-11-26

## 2024-12-05 DIAGNOSIS — I10 PRIMARY HYPERTENSION: ICD-10-CM

## 2024-12-05 RX ORDER — LOSARTAN POTASSIUM 25 MG/1
25 TABLET ORAL DAILY
Qty: 90 TABLET | Refills: 0 | Status: SHIPPED | OUTPATIENT
Start: 2024-12-05

## 2024-12-16 DIAGNOSIS — Z12.11 COLON CANCER SCREENING: ICD-10-CM

## 2025-02-20 DIAGNOSIS — I10 PRIMARY HYPERTENSION: ICD-10-CM

## 2025-02-20 RX ORDER — AMLODIPINE BESYLATE 5 MG
5 TABLET ORAL DAILY
Qty: 90 TABLET | Refills: 0 | Status: SHIPPED | OUTPATIENT
Start: 2025-02-20

## 2025-03-02 DIAGNOSIS — I10 PRIMARY HYPERTENSION: ICD-10-CM

## 2025-03-03 RX ORDER — LOSARTAN POTASSIUM 25 MG
25 TABLET ORAL DAILY
Qty: 90 TABLET | Refills: 0 | Status: SHIPPED | OUTPATIENT
Start: 2025-03-03

## 2025-03-19 DIAGNOSIS — M79.2 NEUROPATHIC PAIN SYNDROME (NON-HERPETIC): ICD-10-CM

## 2025-03-19 DIAGNOSIS — K22.2 SCHATZKI'S RING: ICD-10-CM

## 2025-03-19 DIAGNOSIS — M79.7 FIBROMYALGIA: ICD-10-CM

## 2025-03-19 DIAGNOSIS — M79.605 PAIN IN BOTH LOWER EXTREMITIES: ICD-10-CM

## 2025-03-19 DIAGNOSIS — M79.604 PAIN IN BOTH LOWER EXTREMITIES: ICD-10-CM

## 2025-03-19 RX ORDER — OMEPRAZOLE 40 MG/1
CAPSULE, DELAYED RELEASE ORAL
Qty: 90 CAPSULE | Refills: 0 | Status: SHIPPED | OUTPATIENT
Start: 2025-03-19

## 2025-03-19 RX ORDER — AMITRIPTYLINE HYDROCHLORIDE 10 MG/1
TABLET ORAL
Qty: 90 TABLET | Refills: 0 | Status: SHIPPED | OUTPATIENT
Start: 2025-03-19

## 2025-04-14 DIAGNOSIS — M79.604 PAIN IN BOTH LOWER EXTREMITIES: ICD-10-CM

## 2025-04-14 DIAGNOSIS — M79.605 PAIN IN BOTH LOWER EXTREMITIES: ICD-10-CM

## 2025-04-14 DIAGNOSIS — M79.7 FIBROMYALGIA: ICD-10-CM

## 2025-04-14 DIAGNOSIS — M79.2 NEUROPATHIC PAIN SYNDROME (NON-HERPETIC): ICD-10-CM

## 2025-04-14 RX ORDER — AMITRIPTYLINE HYDROCHLORIDE 10 MG/1
TABLET ORAL
Qty: 90 TABLET | Refills: 0 | Status: SHIPPED | OUTPATIENT
Start: 2025-04-14

## 2025-04-25 PROBLEM — G47.00 INSOMNIA: Status: ACTIVE | Noted: 2025-01-09

## 2025-04-25 PROBLEM — R00.2 PALPITATIONS: Status: RESOLVED | Noted: 2019-05-22 | Resolved: 2025-04-25

## 2025-04-25 PROBLEM — M35.00 SJOGREN'S SYNDROME: Status: ACTIVE | Noted: 2018-09-05

## 2025-04-25 PROBLEM — G47.33 MILD OBSTRUCTIVE SLEEP APNEA: Status: ACTIVE | Noted: 2024-11-07

## 2025-04-25 PROBLEM — G25.81 RESTLESS LEGS SYNDROME (RLS): Status: ACTIVE | Noted: 2025-01-09

## 2025-04-25 PROBLEM — M79.671 FOOT PAIN, RIGHT: Status: RESOLVED | Noted: 2018-06-20 | Resolved: 2025-04-25

## 2025-06-04 DIAGNOSIS — M79.605 PAIN IN BOTH LOWER EXTREMITIES: ICD-10-CM

## 2025-06-04 DIAGNOSIS — M79.7 FIBROMYALGIA: ICD-10-CM

## 2025-06-04 DIAGNOSIS — M79.2 NEUROPATHIC PAIN SYNDROME (NON-HERPETIC): ICD-10-CM

## 2025-06-04 DIAGNOSIS — M79.604 PAIN IN BOTH LOWER EXTREMITIES: ICD-10-CM

## 2025-06-04 RX ORDER — AMITRIPTYLINE HYDROCHLORIDE 10 MG/1
TABLET ORAL
Qty: 90 TABLET | Refills: 0 | Status: SHIPPED | OUTPATIENT
Start: 2025-06-04

## 2025-06-29 DIAGNOSIS — M79.7 FIBROMYALGIA: ICD-10-CM

## 2025-06-29 DIAGNOSIS — M79.2 NEUROPATHIC PAIN SYNDROME (NON-HERPETIC): ICD-10-CM

## 2025-06-29 DIAGNOSIS — M79.604 PAIN IN BOTH LOWER EXTREMITIES: ICD-10-CM

## 2025-06-29 DIAGNOSIS — M79.605 PAIN IN BOTH LOWER EXTREMITIES: ICD-10-CM

## 2025-06-30 RX ORDER — AMITRIPTYLINE HYDROCHLORIDE 10 MG/1
TABLET ORAL
Qty: 90 TABLET | Refills: 0 | Status: SHIPPED | OUTPATIENT
Start: 2025-06-30

## 2025-08-14 ENCOUNTER — TRANSFERRED RECORDS (OUTPATIENT)
Dept: HEALTH INFORMATION MANAGEMENT | Facility: CLINIC | Age: 65
End: 2025-08-14
Payer: COMMERCIAL

## 2025-08-20 DIAGNOSIS — M79.604 PAIN IN BOTH LOWER EXTREMITIES: ICD-10-CM

## 2025-08-20 DIAGNOSIS — M79.2 NEUROPATHIC PAIN SYNDROME (NON-HERPETIC): ICD-10-CM

## 2025-08-20 DIAGNOSIS — M79.7 FIBROMYALGIA: ICD-10-CM

## 2025-08-20 DIAGNOSIS — M79.605 PAIN IN BOTH LOWER EXTREMITIES: ICD-10-CM

## 2025-08-20 RX ORDER — AMITRIPTYLINE HYDROCHLORIDE 10 MG/1
TABLET ORAL
Qty: 90 TABLET | Refills: 0 | Status: SHIPPED | OUTPATIENT
Start: 2025-08-20

## 2025-08-28 ENCOUNTER — PATIENT OUTREACH (OUTPATIENT)
Dept: CARE COORDINATION | Facility: CLINIC | Age: 65
End: 2025-08-28
Payer: COMMERCIAL

## (undated) RX ORDER — REGADENOSON 0.08 MG/ML
INJECTION, SOLUTION INTRAVENOUS
Status: DISPENSED
Start: 2019-07-11